# Patient Record
Sex: FEMALE | Race: WHITE | NOT HISPANIC OR LATINO | Employment: FULL TIME | URBAN - METROPOLITAN AREA
[De-identification: names, ages, dates, MRNs, and addresses within clinical notes are randomized per-mention and may not be internally consistent; named-entity substitution may affect disease eponyms.]

---

## 2017-05-16 ENCOUNTER — HOSPITAL ENCOUNTER (OUTPATIENT)
Dept: RADIOLOGY | Facility: MEDICAL CENTER | Age: 57
End: 2017-05-16
Attending: OBSTETRICS & GYNECOLOGY
Payer: COMMERCIAL

## 2017-05-16 DIAGNOSIS — N00.0: ICD-10-CM

## 2017-05-16 PROCEDURE — G0202 SCR MAMMO BI INCL CAD: HCPCS

## 2017-08-08 ENCOUNTER — OUTPATIENT INFUSION SERVICES (OUTPATIENT)
Dept: ONCOLOGY | Facility: MEDICAL CENTER | Age: 57
End: 2017-08-08
Attending: INTERNAL MEDICINE
Payer: COMMERCIAL

## 2017-08-08 VITALS
RESPIRATION RATE: 18 BRPM | SYSTOLIC BLOOD PRESSURE: 102 MMHG | HEIGHT: 62 IN | OXYGEN SATURATION: 95 % | TEMPERATURE: 98.1 F | WEIGHT: 98.11 LBS | DIASTOLIC BLOOD PRESSURE: 67 MMHG | HEART RATE: 79 BPM | BODY MASS INDEX: 18.05 KG/M2

## 2017-08-08 PROCEDURE — 700111 HCHG RX REV CODE 636 W/ 250 OVERRIDE (IP): Performed by: INTERNAL MEDICINE

## 2017-08-08 PROCEDURE — 96365 THER/PROPH/DIAG IV INF INIT: CPT

## 2017-08-08 RX ORDER — ZOLEDRONIC ACID 5 MG/100ML
5 INJECTION, SOLUTION INTRAVENOUS ONCE
Status: COMPLETED | OUTPATIENT
Start: 2017-08-08 | End: 2017-08-08

## 2017-08-08 RX ADMIN — ZOLEDRONIC ACID 5 MG: 0.05 INJECTION, SOLUTION INTRAVENOUS at 14:09

## 2017-08-08 ASSESSMENT — PAIN SCALES - GENERAL: PAINLEVEL: NO PAIN

## 2017-08-08 NOTE — PROGRESS NOTES
Patient presents ambulatory for reclast infusion.  Patient reports feeling well and denies any s/s of infection at this time.  Patient educated regarding possible complications with bone healing with invasive dental work 8 weeks prior to or 8 weeks after receiving this medication, patient verbalized understanding, and denies having had any invasive dental work in the last 8 weeks nor does she have any scheduled in the next 8 weeks.  PIV established, brisk blood return noted, and flushed.  Labs previously collected, reviewed and are within parameters to proceed with treatment.  Reclast infused per MD order with no complications or adverse reactions.  Patient to see MD before scheduling further reclast appointments.  PIV flushed, brisk blood return noted, discontinued, gauze, and coban applied to site.  Patient left ambulatory in no distress.

## 2017-08-08 NOTE — PROGRESS NOTES
Pharmacy Note:    8/2/17:  Scr = 0.62 with est crcl ~ 61ml/min  Calcium  = 9.3  Previous Reclast at [facility] = 10/3/2010    Ok to proceed with reclast 5 mg infusion  IRON Penaloza Pharm.D.

## 2018-02-27 ENCOUNTER — OFFICE VISIT (OUTPATIENT)
Dept: CARDIOLOGY | Facility: MEDICAL CENTER | Age: 58
End: 2018-02-27
Payer: COMMERCIAL

## 2018-02-27 VITALS
BODY MASS INDEX: 20.58 KG/M2 | SYSTOLIC BLOOD PRESSURE: 120 MMHG | WEIGHT: 109 LBS | HEIGHT: 61 IN | HEART RATE: 70 BPM | DIASTOLIC BLOOD PRESSURE: 70 MMHG

## 2018-02-27 DIAGNOSIS — Z82.49 FAMILY HISTORY OF PERIPHERAL VASCULAR DISEASE: ICD-10-CM

## 2018-02-27 DIAGNOSIS — Z82.3 FAMILY HISTORY OF STROKE: ICD-10-CM

## 2018-02-27 DIAGNOSIS — R07.89 OTHER CHEST PAIN: Primary | ICD-10-CM

## 2018-02-27 LAB — EKG IMPRESSION: NORMAL

## 2018-02-27 PROCEDURE — 99204 OFFICE O/P NEW MOD 45 MIN: CPT | Performed by: INTERNAL MEDICINE

## 2018-02-27 PROCEDURE — 93000 ELECTROCARDIOGRAM COMPLETE: CPT | Performed by: INTERNAL MEDICINE

## 2018-02-27 RX ORDER — TRAZODONE HYDROCHLORIDE 100 MG/1
100 TABLET ORAL NIGHTLY
COMMUNITY
End: 2023-11-06

## 2018-02-27 ASSESSMENT — ENCOUNTER SYMPTOMS
SPUTUM PRODUCTION: 0
COUGH: 1

## 2018-02-27 NOTE — LETTER
Name:          Anisa Murillo   YOB: 1960  Date:     2/27/2018      Regan Patel D.O.  [address]  [address]  [facility] [address]     Waldo Zuluaga MD  [address], [address]  [place] [address]  Phone: [phone]  Back Line: [phone]  Fax: [phone]  E-mail: [email or web address]   Dear Dr. Patel,    We had the pleasure of seeing your patient, Anisa Murillo, in Cardiology Clinic at [facility] and [facility]ascular today.    As you know, she is a 57-year-old woman with a family history of stroke and peripheral artery disease referred for evaluation of chest pain.    The chest pain that she describes to me today is atypical for angina. However, in the setting of her family history of heart disease I did order an exercise stress echocardiogram. I rather suspect this could've been pleuritic in nature, and I did check inflammatory markers (sedimentation rate and C-reactive protein). Alternatively, this could represent reflux and I asked her to attempt ranitidine and calcium carbonate if episodes recur diagnostically to see if she gets relief.    Return in about 3 months (around 5/27/2018).    Thank you for the referral and please do not hesitate to contact me at any time. My contact information is listed above.    This note was dictated using Dragon speech recognition software.     A full note including my physical examination and a full list of rectified medications is available in our medical record, and can be faxed as well.    Waldo Zuluaga MD  Cardiologist  [facility]

## 2018-02-27 NOTE — PROGRESS NOTES
Subjective:   Anisa Murillo is a 57 -year-old woman with a family history of stroke and peripheral artery disease referred for evaluation of chest pain.    She tells me today about an episode of chest discomfort that she had this previous Saturday, 4 days ago. She woke up with the discomfort at 7 AM, and it lasted until 10 PM. She had polydipsia with the episode, which she feels is generally quite uncommon for her. She describes it as a tightness, 7-8 out of 10 in severity. She had mild dyspnea associated with it and no diaphoresis. It was strictly nonexertional and did not worsen whatsoever when she climbed the stairs in her house. She has not had any similar chest discomfort with exertion recently. She did have one smaller episode last night lasting on and off for a few hours. She denies any palliating or provoking factors such as position, food, or exertion again. She does note that she had a cold about one month ago.    She has no other cardiovascular complaints. Her mother had a carotid endarterectomy at the age of 80.    Past Medical History:   Diagnosis Date   • Anxiety 3/24/2015   • Contusion 10/26/2015   • Eczema 3/22/2016   • Herpes simplex type 1 infection 10/26/2015   • Hypertension    • Insomnia    • Osteopenia 3/24/2015     Past Surgical History:   Procedure Laterality Date   • PB ENLARGE BREAST WITH IMPLANT       Family History   Problem Relation Age of Onset   • Cancer Mother      breast    • Diabetes Mother    • Stroke Father    • Stroke Sister    • Genetic Son      autism      History   Smoking Status   • Former Smoker   • Years: 1.00   • Types: Cigarettes   Smokeless Tobacco   • Never Used     No Known Allergies  Outpatient Encounter Prescriptions as of 2/27/2018   Medication Sig Dispense Refill   • traZODone (DESYREL) 100 MG Tab Take 100 mg by mouth every evening.     • Multiple Vitamin (MULTI-VITAMIN DAILY PO) Take  by mouth.     • FIBER SELECT GUMMIES PO Take  by mouth.     • vitamin D  "(CHOLECALCIFEROL) 1000 UNIT TABS Take 2,000 Units by mouth every day.     • Calcium-Vitamin D (CALCIUM + D PO) Take  by mouth.     • [DISCONTINUED] sertraline (ZOLOFT) 50 MG Tab Take 1 Tab by mouth every day. 90 Tab 3   • [DISCONTINUED] alprazolam (XANAX) 1 MG Tab Take 1 Tab by mouth at bedtime as needed for Sleep. AS NEEDED FOR SLEEP 30 Tab 0   • [DISCONTINUED] valacyclovir (VALTREX) 500 MG Tab Take 1 Tab by mouth 2 times a day. 40 Tab 1     No facility-administered encounter medications on file as of 2/27/2018.      Review of Systems   Constitutional:        Febrile illness mid January, 2018 (URI)   HENT: Positive for congestion.    Respiratory: Positive for cough. Negative for sputum production.    Cardiovascular: Positive for chest pain.   All other systems reviewed and are negative.       Objective:   /70   Pulse 70   Ht 1.549 m (5' 1\")   Wt 49.4 kg (109 lb)   BMI 20.60 kg/m²     Physical Exam   Constitutional: She is oriented to person, place, and time. She appears well-developed and well-nourished. No distress.   Well-dressed, middle-aged woman in no distress   HENT:   Head: Normocephalic and atraumatic.   Eyes: Conjunctivae and EOM are normal. Pupils are equal, round, and reactive to light. No scleral icterus.   Neck: Neck supple. No JVD present. No tracheal deviation present.   Cardiovascular: Normal rate, regular rhythm, normal heart sounds and intact distal pulses.  Exam reveals no gallop and no friction rub.    No murmur heard.  Pulses:       Dorsalis pedis pulses are 2+ on the right side, and 2+ on the left side.   No carotid bruits   Pulmonary/Chest: Effort normal and breath sounds normal. No stridor. No respiratory distress. She has no wheezes. She has no rales.   Abdominal: Soft. Bowel sounds are normal. She exhibits no distension.   Musculoskeletal: She exhibits no edema.   Neurological: She is alert and oriented to person, place, and time.   Skin: Skin is warm and dry. No rash noted. She " is not diaphoretic. No erythema. No pallor.   Psychiatric: She has a normal mood and affect. Judgment and thought content normal.   Vitals reviewed.    Lab Results   Component Value Date/Time    WBC 4.7 03/31/2015 08:42 AM    WBC 5.7 08/27/2013 07:38 AM    RBC 4.03 03/31/2015 08:42 AM    RBC 4.05 (L) 08/27/2013 07:38 AM    HEMOGLOBIN 13.5 03/31/2015 08:42 AM    HEMOGLOBIN 13.9 08/27/2013 07:38 AM    HEMATOCRIT 39.8 03/31/2015 08:42 AM    HEMATOCRIT 40.5 08/27/2013 07:38 AM    MCV 99 (H) 03/31/2015 08:42 AM    .0 (H) 08/27/2013 07:38 AM    MCH 33.5 (H) 03/31/2015 08:42 AM    MCH 34.4 (H) 08/27/2013 07:38 AM    MCHC 33.9 03/31/2015 08:42 AM    MCHC 34.4 08/27/2013 07:38 AM    MPV 7.7 08/27/2013 07:38 AM        Lab Results   Component Value Date/Time    SODIUM 144 06/27/2016 07:03 AM    SODIUM 139 10/08/2013 07:40 AM    POTASSIUM 4.5 06/27/2016 07:03 AM    POTASSIUM 3.8 10/08/2013 07:40 AM    CHLORIDE 104 06/27/2016 07:03 AM    CHLORIDE 103 10/08/2013 07:40 AM    CO2 26 06/27/2016 07:03 AM    CO2 27 10/08/2013 07:40 AM    GLUCOSE 103 (H) 06/27/2016 07:03 AM    GLUCOSE 88 10/08/2013 07:40 AM    BUN 12 06/27/2016 07:03 AM    BUN 21 10/08/2013 07:40 AM    CREATININE 0.76 06/27/2016 07:03 AM    CREATININE 0.65 10/08/2013 07:40 AM    BUNCREATRAT 16 06/27/2016 07:03 AM        Lab Results   Component Value Date/Time    ASTSGOT 17 06/27/2016 07:03 AM    ASTSGOT 20 08/27/2013 07:38 AM    ALTSGPT 15 06/27/2016 07:03 AM    ALTSGPT 16 08/27/2013 07:38 AM        Lab Results   Component Value Date/Time    CHOLSTRLTOT 187 06/27/2016 07:03 AM    CHOLSTRLTOT 214 (H) 10/08/2013 07:40 AM    LDL 81 06/27/2016 07:03 AM    LDL 81 10/08/2013 07:40 AM    HDL 92 06/27/2016 07:03 AM     10/08/2013 07:40 AM    TRIGLYCERIDE 70 06/27/2016 07:03 AM    TRIGLYCERIDE 61 10/08/2013 07:40 AM       Her EKG in the office today shows sinus rhythm with no chamber enlargement nor ischemic changes.    Assessment:     1. Other chest pain  EKG     Echo-Rest/Stress w/o Contrast    WESTERGREN SED RATE    C-REACTIVE PROTEIN, QUANT C   2. Family history of stroke     3. Family history of peripheral vascular disease         Medical Decision Making:  Today's Assessment / Status / Plan:     The chest pain that she describes to me today is atypical for angina. However, in the setting of her family history of heart disease I did order an exercise stress echocardiogram. I rather suspect this could've been pleuritic in nature, and I did check inflammatory markers (sedimentation rate and C-reactive protein). Alternatively, this could represent reflux and I asked her to attempt ranitidine and calcium carbonate if episodes recur diagnostically to see if she gets relief.    Waldo Zuluaga MD  Cardiologist, [facility] [place]     Return in about 3 months (around 5/27/2018).

## 2018-03-02 LAB
CRP SERPL-MCNC: <0.3 MG/L (ref 0–4.9)
HBA1C MFR BLD: 5.2 % (ref 4.8–5.6)

## 2018-03-05 ENCOUNTER — TELEPHONE (OUTPATIENT)
Dept: CARDIOLOGY | Facility: MEDICAL CENTER | Age: 58
End: 2018-03-05

## 2018-03-05 NOTE — TELEPHONE ENCOUNTER
----- Message from Waldo Zuluaga M.D. sent at 3/2/2018  5:02 PM PST -----  No evidence of ongoing inflammation associated with her chest pain.    ROSIE Zuluaga M.D.  Audra Gray, R.N.             Normal lead sugars (no evidence of diabetes).     ROSIE MARRUFO        ===================================================================    Attempted to call pt, no answer, detailed vm left that Cell[employer] message will be sent in regards to her blood test.

## 2018-03-06 NOTE — TELEPHONE ENCOUNTER
Kya Gray R.N.   Phone Number: [phone]             IA/eliza     Pt calling to ask if it is really necessary to do the echo / stress test in view of the positive results of recent blood work.     Please call Anisa at work # [phone] or if 11:30am-1pm on cell # [phone]      ==================================================    Upon chart review last OV notes from Dr Zuluaga 2/27/18:    The chest pain that she describes to me today is atypical for angina. However, in the setting of her family history of heart disease I did order an exercise stress echocardiogram. I rather suspect this could've been pleuritic in nature, and I did check inflammatory markers (sedimentation rate and C-reactive protein).     Called pt back, discussed above recommendations by Dr Zuluaga and recent lab results, pt verbalizes understanding

## 2018-03-15 ENCOUNTER — HOSPITAL ENCOUNTER (OUTPATIENT)
Dept: CARDIOLOGY | Facility: MEDICAL CENTER | Age: 58
End: 2018-03-15
Attending: INTERNAL MEDICINE
Payer: COMMERCIAL

## 2018-03-15 DIAGNOSIS — R07.89 OTHER CHEST PAIN: ICD-10-CM

## 2018-03-15 LAB — LV EJECT FRACT  99904: 55

## 2018-03-15 PROCEDURE — 93018 CV STRESS TEST I&R ONLY: CPT | Performed by: INTERNAL MEDICINE

## 2018-03-15 PROCEDURE — 93017 CV STRESS TEST TRACING ONLY: CPT

## 2018-03-15 PROCEDURE — 93350 STRESS TTE ONLY: CPT

## 2018-03-15 PROCEDURE — 93350 STRESS TTE ONLY: CPT | Mod: 26 | Performed by: INTERNAL MEDICINE

## 2018-03-16 ENCOUNTER — TELEPHONE (OUTPATIENT)
Dept: CARDIOLOGY | Facility: MEDICAL CENTER | Age: 58
End: 2018-03-16

## 2018-03-16 NOTE — TELEPHONE ENCOUNTER
----- Message from Waldo Zuluaga M.D. sent at 3/16/2018  8:49 AM PDT -----  Stress test is negative indicating that chest pain was not related to the arteries of her heart.    ROSIE MARRUFO

## 2018-03-16 NOTE — TELEPHONE ENCOUNTER
----- Message from Kiersten Vazquez L.P.N. sent at 3/16/2018  3:03 PM PDT -----      ----- Message -----  From: Rhina Hernandez R.N.  Sent: 3/16/2018  10:02 AM  To: Rhina Hernandez R.N.        ----- Message -----  From: Waldo Zuluaga M.D.  Sent: 3/16/2018   8:49 AM  To: Audra Gray R.N.    Stress test is negative indicating that chest pain was not related to the arteries of her heart.    ROSIE MARRUFO

## 2018-03-19 ENCOUNTER — TELEPHONE (OUTPATIENT)
Dept: CARDIOLOGY | Facility: MEDICAL CENTER | Age: 58
End: 2018-03-19

## 2018-03-19 NOTE — TELEPHONE ENCOUNTER
----- Message from Kya Guzman sent at 3/19/2018  1:51 PM PDT -----  Regarding: appt advice  Contact: [phone]  IA/eliza    Pt calling to ask if it will be necessary for pt to keep the upcoming appt with IA. Please call pt at .    ================================================================    Called pt, informed pt that we usually recommend to keep her appt as recommended by Dr Zuluaga from her last OV despite of the negative test but it is till up to her if she wants to keep it or not, pt verbalizes understanding

## 2018-05-18 ENCOUNTER — HOSPITAL ENCOUNTER (OUTPATIENT)
Dept: RADIOLOGY | Facility: MEDICAL CENTER | Age: 58
End: 2018-05-18
Attending: NURSE PRACTITIONER
Payer: COMMERCIAL

## 2018-05-18 DIAGNOSIS — Z12.31 SCREENING MAMMOGRAM, ENCOUNTER FOR: ICD-10-CM

## 2018-05-18 PROCEDURE — 77063 BREAST TOMOSYNTHESIS BI: CPT

## 2018-06-20 ENCOUNTER — OFFICE VISIT (OUTPATIENT)
Dept: CARDIOLOGY | Facility: MEDICAL CENTER | Age: 58
End: 2018-06-20
Payer: COMMERCIAL

## 2018-06-20 VITALS
SYSTOLIC BLOOD PRESSURE: 102 MMHG | HEIGHT: 61 IN | OXYGEN SATURATION: 95 % | DIASTOLIC BLOOD PRESSURE: 60 MMHG | HEART RATE: 88 BPM | WEIGHT: 109 LBS | BODY MASS INDEX: 20.58 KG/M2

## 2018-06-20 DIAGNOSIS — E78.5 DYSLIPIDEMIA: ICD-10-CM

## 2018-06-20 DIAGNOSIS — R07.89 NON-CARDIAC CHEST PAIN: Primary | ICD-10-CM

## 2018-06-20 DIAGNOSIS — M79.642 LEFT HAND PAIN: ICD-10-CM

## 2018-06-20 DIAGNOSIS — Z82.3 FAMILY HISTORY OF STROKE: ICD-10-CM

## 2018-06-20 DIAGNOSIS — Z82.49 FAMILY HISTORY OF PERIPHERAL VASCULAR DISEASE: ICD-10-CM

## 2018-06-20 PROCEDURE — 99213 OFFICE O/P EST LOW 20 MIN: CPT | Performed by: INTERNAL MEDICINE

## 2018-06-20 ASSESSMENT — ENCOUNTER SYMPTOMS
SPUTUM PRODUCTION: 0
CARDIOVASCULAR NEGATIVE: 1

## 2018-06-20 NOTE — LETTER
Name:          Anisa Murillo   YOB: 1960  Date:     06/20/2018      Regan Patel D.O.  [address] [address]     Waldo Zuluaga MD  [address], [address]  [place] [address]  Phone: [phone]  Back Line: [phone]  Fax: [phone]  E-mail: [email or web address]   Dear Dr. Patel,    We had the pleasure of seeing your patient, Anisa Murillo, in Cardiology Clinic at [facility] and Vascular today.    As you know, she is a 58-year-old woman with a family history of stroke and peripheral artery disease referred for evaluation of chest pain.    She has no cardiovascular complaints today, and I reviewed the results of her exercise stress echocardiogram from March that documented no ischemic defect with normal left ventricular ejection fraction.  I also reviewed with her the C-reactive protein that she had again from March, which was negative.  Her chest pain is noncardiac.  I did order repeat lipid panel, and will plan to follow that on an annual basis.    She relates some pain and has some redness on exam of her left fourth PIP joint.  I ordered x-rays of her hand.  I recommended that she follow-up in the primary care setting within 1 week if there is not rapid resolution of that inflammation.    Return in about 1 year (around 6/20/2019).    Thank you for the referral and please do not hesitate to contact me at any time. My contact information is listed above.    This note was dictated using Dragon speech recognition software.     A full note including my physical examination and a full list of rectified medications is available in our medical record, and can be faxed as well.    Waldo Zuluaga MD  Cardiologist  [facility]

## 2018-06-20 NOTE — PROGRESS NOTES
Subjective:   Anisa Murillo is a 58 -year-old woman with a family history of stroke and peripheral artery disease referred for evaluation of chest pain.    She is doing well today from a cardiovascular perspective and has no complaints with interval resolution of her noncardiac chest pain workup with exercise stress echocardiogram that I reviewed with her as below demonstrating no ischemic defect.    She does tell me that for about 3 weeks she has had some redness and pain with flexion of her left fourth PIP joint.  That happened apparently after pulling some weeds.    Past Medical History:   Diagnosis Date   • Anxiety 3/24/2015   • Contusion 10/26/2015   • Eczema 3/22/2016   • Herpes simplex type 1 infection 10/26/2015   • Hypertension    • Insomnia    • Osteopenia 3/24/2015     Past Surgical History:   Procedure Laterality Date   • PB ENLARGE BREAST WITH IMPLANT       Family History   Problem Relation Age of Onset   • Cancer Mother      breast    • Diabetes Mother    • Heart Disease Mother 80     carotid disease and CEA   • Stroke Father    • Stroke Sister    • Genetic Son      autism      History   Smoking Status   • Former Smoker   • Years: 1.00   • Types: Cigarettes   Smokeless Tobacco   • Never Used     No Known Allergies  Outpatient Encounter Prescriptions as of 6/20/2018   Medication Sig Dispense Refill   • Multiple Vitamin (MULTI-VITAMIN DAILY PO) Take  by mouth.     • FIBER SELECT GUMMIES PO Take  by mouth.     • vitamin D (CHOLECALCIFEROL) 1000 UNIT TABS Take 2,000 Units by mouth every day.     • Calcium-Vitamin D (CALCIUM + D PO) Take  by mouth.     • traZODone (DESYREL) 100 MG Tab Take 100 mg by mouth every evening.       No facility-administered encounter medications on file as of 6/20/2018.      Review of Systems   Constitutional:        Febrile illness mid January, 2018 (URI)   HENT: Positive for congestion.    Respiratory: Negative for sputum production.    Cardiovascular: Negative.   "  Musculoskeletal: Positive for joint pain.   All other systems reviewed and are negative.       Objective:   /60   Pulse 88   Ht 1.549 m (5' 1\")   Wt 49.4 kg (109 lb)   SpO2 95%   BMI 20.60 kg/m²     Physical Exam   Constitutional: She is oriented to person, place, and time. She appears well-developed and well-nourished. No distress.   Well-dressed, middle-aged woman in no distress   HENT:   Head: Normocephalic and atraumatic.   Eyes: Conjunctivae and EOM are normal. Pupils are equal, round, and reactive to light. No scleral icterus.   Neck: Neck supple. No JVD present. No tracheal deviation present.   Cardiovascular: Normal rate, regular rhythm, normal heart sounds and intact distal pulses.  Exam reveals no gallop and no friction rub.    No murmur heard.  Pulses:       Dorsalis pedis pulses are 2+ on the right side, and 2+ on the left side.   No carotid bruits   Pulmonary/Chest: Effort normal and breath sounds normal. No stridor. No respiratory distress. She has no wheezes. She has no rales.   Abdominal: Soft. Bowel sounds are normal. She exhibits no distension.   Musculoskeletal: She exhibits no edema.   Erythema of the skin on the medial aspect of her left fourth finger and pain with flexion of her left fourth PIP joint   Neurological: She is alert and oriented to person, place, and time.   Skin: Skin is warm and dry. No rash noted. She is not diaphoretic. No erythema. No pallor.   Psychiatric: She has a normal mood and affect. Judgment and thought content normal.   Vitals reviewed.    Lab Results   Component Value Date/Time    WBC 4.7 03/31/2015 08:42 AM    WBC 5.7 08/27/2013 07:38 AM    RBC 4.03 03/31/2015 08:42 AM    RBC 4.05 (L) 08/27/2013 07:38 AM    HEMOGLOBIN 13.5 03/31/2015 08:42 AM    HEMOGLOBIN 13.9 08/27/2013 07:38 AM    HEMATOCRIT 39.8 03/31/2015 08:42 AM    HEMATOCRIT 40.5 08/27/2013 07:38 AM    MCV 99 (H) 03/31/2015 08:42 AM    .0 (H) 08/27/2013 07:38 AM    MCH 33.5 (H) 03/31/2015 " "08:42 AM    MCH 34.4 (H) 08/27/2013 07:38 AM    MCHC 33.9 03/31/2015 08:42 AM    MCHC 34.4 08/27/2013 07:38 AM    MPV 7.7 08/27/2013 07:38 AM        Lab Results   Component Value Date/Time    SODIUM 144 06/27/2016 07:03 AM    SODIUM 139 10/08/2013 07:40 AM    POTASSIUM 4.5 06/27/2016 07:03 AM    POTASSIUM 3.8 10/08/2013 07:40 AM    CHLORIDE 104 06/27/2016 07:03 AM    CHLORIDE 103 10/08/2013 07:40 AM    CO2 26 06/27/2016 07:03 AM    CO2 27 10/08/2013 07:40 AM    GLUCOSE 103 (H) 06/27/2016 07:03 AM    GLUCOSE 88 10/08/2013 07:40 AM    BUN 12 06/27/2016 07:03 AM    BUN 21 10/08/2013 07:40 AM    CREATININE 0.76 06/27/2016 07:03 AM    CREATININE 0.65 10/08/2013 07:40 AM    BUNCREATRAT 16 06/27/2016 07:03 AM        Lab Results   Component Value Date/Time    ASTSGOT 17 06/27/2016 07:03 AM    ASTSGOT 20 08/27/2013 07:38 AM    ALTSGPT 15 06/27/2016 07:03 AM    ALTSGPT 16 08/27/2013 07:38 AM        Lab Results   Component Value Date/Time    CHOLSTRLTOT 187 06/27/2016 07:03 AM    CHOLSTRLTOT 214 (H) 10/08/2013 07:40 AM    LDL 81 06/27/2016 07:03 AM    LDL 81 10/08/2013 07:40 AM    HDL 92 06/27/2016 07:03 AM     10/08/2013 07:40 AM    TRIGLYCERIDE 70 06/27/2016 07:03 AM    TRIGLYCERIDE 61 10/08/2013 07:40 AM       Her EKG in the office today shows sinus rhythm with no chamber enlargement nor ischemic changes.    Exercise stress echocardiogram, 3/15/2018:  \"CONCLUSIONS  Negative stress echocardiogram for ischemia with adequate stress achieved by heart rate criteria.\"    Assessment:     1. Non-cardiac chest pain     2. Family history of stroke  LIPID PANEL   3. Left hand pain  X-RAY HAND 3+ VW   4. Family history of peripheral vascular disease     5. Dyslipidemia  LIPID PANEL       Medical Decision Making:  Today's Assessment / Status / Plan:     She has no cardiovascular complaints today, and I reviewed the results of her exercise stress echocardiogram from March that documented no ischemic defect with normal left " ventricular ejection fraction.  I also reviewed with her the C-reactive protein that she had again from March, which was negative.  Her chest pain is noncardiac.  I did order repeat lipid panel, and will plan to follow that on an annual basis.    She relates some pain and has some redness on exam of her left fourth PIP joint.  I ordered x-rays of her hand.  I recommended that she follow-up in the primary care setting within 1 week if there is not rapid resolution of that inflammation.    Waldo Zuluaga MD  Cardiologist, [facility] [place]     Return in about 1 year (around 6/20/2019).    Physical Exam   Constitutional: She is oriented to person, place, and time. She appears well-developed and well-nourished. No distress.   Well-dressed, middle-aged woman in no distress   HENT:   Head: Normocephalic and atraumatic.   Eyes: Conjunctivae and EOM are normal. Pupils are equal, round, and reactive to light. No scleral icterus.   Neck: Neck supple. No JVD present. No tracheal deviation present.   Cardiovascular: Normal rate, regular rhythm, normal heart sounds and intact distal pulses.  Exam reveals no gallop and no friction rub.    No murmur heard.  Pulses:       Dorsalis pedis pulses are 2+ on the right side, and 2+ on the left side.   No carotid bruits   Pulmonary/Chest: Effort normal and breath sounds normal. No stridor. No respiratory distress. She has no wheezes. She has no rales.   Abdominal: Soft. Bowel sounds are normal. She exhibits no distension.   Musculoskeletal: She exhibits no edema.   Erythema of the skin on the medial aspect of her left fourth finger and pain with flexion of her left fourth PIP joint   Neurological: She is alert and oriented to person, place, and time.   Skin: Skin is warm and dry. No rash noted. She is not diaphoretic. No erythema. No pallor.   Psychiatric: She has a normal mood and affect. Judgment and thought content normal.   Vitals reviewed.

## 2018-06-21 ENCOUNTER — OFFICE VISIT (OUTPATIENT)
Dept: URGENT CARE | Facility: PHYSICIAN GROUP | Age: 58
End: 2018-06-21
Payer: COMMERCIAL

## 2018-06-21 VITALS
BODY MASS INDEX: 20.58 KG/M2 | WEIGHT: 109 LBS | HEIGHT: 61 IN | TEMPERATURE: 99.5 F | SYSTOLIC BLOOD PRESSURE: 104 MMHG | OXYGEN SATURATION: 95 % | RESPIRATION RATE: 16 BRPM | DIASTOLIC BLOOD PRESSURE: 72 MMHG | HEART RATE: 74 BPM

## 2018-06-21 DIAGNOSIS — L03.012 CELLULITIS OF FINGER OF LEFT HAND: ICD-10-CM

## 2018-06-21 PROCEDURE — 99214 OFFICE O/P EST MOD 30 MIN: CPT | Performed by: NURSE PRACTITIONER

## 2018-06-21 RX ORDER — AMOXICILLIN 500 MG/1
500 CAPSULE ORAL 2 TIMES DAILY
Qty: 14 CAP | Refills: 0 | Status: SHIPPED | OUTPATIENT
Start: 2018-06-21 | End: 2018-06-28

## 2018-06-21 ASSESSMENT — ENCOUNTER SYMPTOMS
MYALGIAS: 0
WEAKNESS: 0
CHILLS: 0
TINGLING: 0
FEVER: 0
SENSORY CHANGE: 0

## 2018-06-21 NOTE — PROGRESS NOTES
"Subjective:      Anisa Murillo is a 58 y.o. female who presents with Finger Pain (finger pain, swelling x3 weeks - atraumatic)            HPI  Anisa is here for left ring finger redness and discomfort. States was doing yard work 3 weeks ago and unknown if she got a a \"sticker\" in finger or if possible bug bite. Denies fever, red streaking or generalized swelling of finger. Able to bend finger with no problems but \"feels tight\" when doing this. States saw here cardiologist today and had mentioned this finger and ordered an xray and sent her to .    PMH:  has a past medical history of Anxiety (3/24/2015); Contusion (10/26/2015); Eczema (3/22/2016); Herpes simplex type 1 infection (10/26/2015); Hypertension; Insomnia; and Osteopenia (3/24/2015). She also has no past medical history of Breast cancer (HCC).  MEDS:   Current Outpatient Prescriptions:   •  amoxicillin (AMOXIL) 500 MG Cap, Take 1 Cap by mouth 2 times a day for 7 days., Disp: 14 Cap, Rfl: 0  •  traZODone (DESYREL) 100 MG Tab, Take 100 mg by mouth every evening., Disp: , Rfl:   •  Multiple Vitamin (MULTI-VITAMIN DAILY PO), Take  by mouth., Disp: , Rfl:   •  FIBER SELECT GUMMIES PO, Take  by mouth., Disp: , Rfl:   •  vitamin D (CHOLECALCIFEROL) 1000 UNIT TABS, Take 2,000 Units by mouth every day., Disp: , Rfl:   •  Calcium-Vitamin D (CALCIUM + D PO), Take  by mouth., Disp: , Rfl:   ALLERGIES: No Known Allergies  SURGHX:   Past Surgical History:   Procedure Laterality Date   • PB ENLARGE BREAST WITH IMPLANT       SOCHX:  reports that she has quit smoking. Her smoking use included Cigarettes. She quit after 1.00 year of use. She has never used smokeless tobacco. She reports that she does not drink alcohol or use drugs.  FH: Family history was reviewed, no pertinent findings to report'  Review of Systems   Constitutional: Negative for chills, fever and malaise/fatigue.   Musculoskeletal: Negative for joint pain and myalgias.   Skin: Negative for itching " "and rash.   Neurological: Negative for tingling, sensory change and weakness.   All other systems reviewed and are negative.         Objective:     /72   Pulse 74   Temp 37.5 °C (99.5 °F)   Resp 16   Ht 1.549 m (5' 1\")   Wt 49.4 kg (109 lb)   SpO2 95%   BMI 20.60 kg/m²      Physical Exam   Constitutional: She is oriented to person, place, and time. Vital signs are normal. She appears well-developed and well-nourished. She is active and cooperative.  Non-toxic appearance. She does not have a sickly appearance. She does not appear ill. No distress.   HENT:   Head: Normocephalic.   Eyes: Conjunctivae and EOM are normal. Pupils are equal, round, and reactive to light.   Neck: Normal range of motion. Neck supple.   Cardiovascular: Normal rate.    Pulmonary/Chest: Effort normal.   Musculoskeletal:        Left hand: She exhibits normal range of motion, no tenderness, no bony tenderness, normal two-point discrimination, normal capillary refill, no deformity, no laceration and no swelling. Normal sensation noted. Normal strength noted.        Hands:  Neurological: She is alert and oriented to person, place, and time.   Skin: Skin is warm and dry. No abrasion, no bruising, no ecchymosis, no laceration, no lesion and no rash noted. She is not diaphoretic. No erythema.   Has minimal superficial redness and swelling at medial aspect of left ring finger along 2nd joint but does not appear to have joint involvment.    Vitals reviewed.              Assessment/Plan:     1. Cellulitis of finger of left hand    - amoxicillin (AMOXIL) 500 MG Cap; Take 1 Cap by mouth 2 times a day for 7 days.  Dispense: 14 Cap; Refill: 0    May use NSAID prn for discomfort  May continue Benadryl x 2 days for immediate relief of itchiness of rash from possible bug bite  May use Claritin x 10 days for longer acting antihistamine prn  Wash in neutral pH, non-perfumed soap and lukewarm water, pat dry  Moisturize skin around area to avoid " dryness  Soak finger in Epsom salts  May apply moist warm compress to draw out swelling  Elevate hand, cool compresses for any generalized swelling  Monitor for increase in redness of skin or spreading up hand, blistering, weeping of sites, fever- need re-evaluation immediately

## 2018-06-23 LAB
CHOLEST SERPL-MCNC: 199 MG/DL (ref 100–199)
HDLC SERPL-MCNC: 86 MG/DL
LABORATORY COMMENT REPORT: NORMAL
LDLC SERPL CALC-MCNC: 89 MG/DL (ref 0–99)
TRIGL SERPL-MCNC: 120 MG/DL (ref 0–149)
VLDLC SERPL CALC-MCNC: 24 MG/DL (ref 5–40)

## 2018-06-29 ENCOUNTER — TELEPHONE (OUTPATIENT)
Dept: CARDIOLOGY | Facility: MEDICAL CENTER | Age: 58
End: 2018-06-29

## 2018-06-29 NOTE — TELEPHONE ENCOUNTER
----- Message from Melissa Cary sent at 6/29/2018  8:47 AM PDT -----  Regarding: calling for Lipid results  IA/Audra    Patient is calling for Lipid results. She can be reached at [phone].    ===================================================    Called pt, informed pt that Dr Zuluaga hasn't review the result yet and made his recommendations but Lipid Panel looks good and all within normal limits, informed pt that if Dr Zuluaga has further recommendations, we will give her a call back, pt appreciative and verbalizes understanding

## 2018-07-03 NOTE — TELEPHONE ENCOUNTER
Dr Zuluaga reviewed pt's Lipid Panel, per Dr Zuluaga Cholesterol looks good, not really much different from previous, cont diet and exercise.     Called pt and notified, pt verbalizes understanding

## 2018-08-07 ENCOUNTER — OFFICE VISIT (OUTPATIENT)
Dept: MEDICAL GROUP | Facility: PHYSICIAN GROUP | Age: 58
End: 2018-08-07
Payer: COMMERCIAL

## 2018-08-07 ENCOUNTER — HOSPITAL ENCOUNTER (OUTPATIENT)
Facility: MEDICAL CENTER | Age: 58
End: 2018-08-07
Attending: FAMILY MEDICINE
Payer: COMMERCIAL

## 2018-08-07 VITALS
WEIGHT: 110 LBS | BODY MASS INDEX: 20.77 KG/M2 | SYSTOLIC BLOOD PRESSURE: 116 MMHG | HEIGHT: 61 IN | DIASTOLIC BLOOD PRESSURE: 74 MMHG | TEMPERATURE: 97.4 F | OXYGEN SATURATION: 96 % | HEART RATE: 74 BPM

## 2018-08-07 DIAGNOSIS — R30.0 DYSURIA: ICD-10-CM

## 2018-08-07 DIAGNOSIS — N94.9 VAGINAL BURNING: ICD-10-CM

## 2018-08-07 DIAGNOSIS — N30.00 ACUTE CYSTITIS WITHOUT HEMATURIA: ICD-10-CM

## 2018-08-07 PROBLEM — N94.89 VAGINAL BURNING: Status: ACTIVE | Noted: 2018-08-07

## 2018-08-07 LAB
APPEARANCE UR: CLEAR
BILIRUB UR STRIP-MCNC: NORMAL MG/DL
COLOR UR AUTO: YELLOW
GLUCOSE UR STRIP.AUTO-MCNC: NORMAL MG/DL
KETONES UR STRIP.AUTO-MCNC: NORMAL MG/DL
LEUKOCYTE ESTERASE UR QL STRIP.AUTO: NORMAL
NITRITE UR QL STRIP.AUTO: NORMAL
PH UR STRIP.AUTO: 6 [PH] (ref 5–8)
PROT UR QL STRIP: NORMAL MG/DL
RBC UR QL AUTO: NORMAL
SP GR UR STRIP.AUTO: 1.02
UROBILINOGEN UR STRIP-MCNC: 0.2 MG/DL

## 2018-08-07 PROCEDURE — 81002 URINALYSIS NONAUTO W/O SCOPE: CPT | Performed by: FAMILY MEDICINE

## 2018-08-07 PROCEDURE — 87077 CULTURE AEROBIC IDENTIFY: CPT

## 2018-08-07 PROCEDURE — 87086 URINE CULTURE/COLONY COUNT: CPT

## 2018-08-07 PROCEDURE — 99214 OFFICE O/P EST MOD 30 MIN: CPT | Performed by: FAMILY MEDICINE

## 2018-08-07 PROCEDURE — 87186 SC STD MICRODIL/AGAR DIL: CPT

## 2018-08-07 RX ORDER — IBUPROFEN 800 MG/1
800 TABLET ORAL EVERY 8 HOURS PRN
Qty: 60 TAB | Refills: 1 | Status: SHIPPED | OUTPATIENT
Start: 2018-08-07 | End: 2018-08-21

## 2018-08-07 RX ORDER — FLUCONAZOLE 200 MG/1
200 TABLET ORAL ONCE
Qty: 1 TAB | Refills: 1 | Status: SHIPPED | OUTPATIENT
Start: 2018-08-07 | End: 2018-08-07

## 2018-08-07 RX ORDER — NITROFURANTOIN 25; 75 MG/1; MG/1
100 CAPSULE ORAL 2 TIMES DAILY
COMMUNITY
End: 2018-08-21

## 2018-08-07 ASSESSMENT — PATIENT HEALTH QUESTIONNAIRE - PHQ9: CLINICAL INTERPRETATION OF PHQ2 SCORE: 0

## 2018-08-07 NOTE — PROGRESS NOTES
Subjective:   Anisa Murillo is a 58 y.o. female here today for burning with urination, vaginal burning and itching    Dysuria  This problem is new to me.  Started out with low back pain and burning with urination approximately 7 days ago; reports chills but no fever; no n/v/d.  Patient reports that she was seen through a virtual visit and given prescription for Macrobid.  She reports compliance with medication but states there is been no resolution of symptoms.  She states that the burning has continued; she denies any suprapubic pain but is continuing to report low back pain    Vaginal burning  This problem is new to me.  Patient reports that over the last 2-3 days she started having vaginal itching and burning.  She states that she has noticed significant external redness around the area of the vagina.  She currently denies any discharge.         Current medicines (including changes today)  Current Outpatient Prescriptions   Medication Sig Dispense Refill   • nitrofurantoin monohydr macro (MACROBID) 100 MG Cap Take 100 mg by mouth 2 times a day.     • ibuprofen (MOTRIN) 800 MG Tab Take 1 Tab by mouth every 8 hours as needed. 60 Tab 1   • fluconazole (DIFLUCAN) 200 MG Tab Take 1 Tab by mouth Once for 1 dose. 1 Tab 1   • Multiple Vitamin (MULTI-VITAMIN DAILY PO) Take  by mouth.     • FIBER SELECT GUMMIES PO Take  by mouth.     • vitamin D (CHOLECALCIFEROL) 1000 UNIT TABS Take 2,000 Units by mouth every day.     • Calcium-Vitamin D (CALCIUM + D PO) Take  by mouth.     • traZODone (DESYREL) 100 MG Tab Take 100 mg by mouth every evening.       No current facility-administered medications for this visit.      She  has a past medical history of Anxiety (3/24/2015); Contusion (10/26/2015); Eczema (3/22/2016); Herpes simplex type 1 infection (10/26/2015); Hypertension; Insomnia; and Osteopenia (3/24/2015). She also has no past medical history of Breast cancer (HCC).    ROS   No chest pain, no shortness of breath, no  "abdominal pain  + Pain with urination; vaginal burning     Objective:     Blood pressure 116/74, pulse 74, temperature 36.3 °C (97.4 °F), height 1.549 m (5' 1\"), weight 49.9 kg (110 lb), SpO2 96 %. Body mass index is 20.78 kg/m².   Physical Exam:  Alert, oriented in no acute distress.  Eye contact is good, speech goal directed, affect calm  HEENT: conjunctiva non-injected, sclera non-icteric.  Pinna normal. Oral mucous membranes pink and moist with no lesions.  Neck No adenopathy or masses in the neck or supraclavicular regions.  Lungs: clear to auscultation bilaterally with good excursion.  CV: regular rate and rhythm.  Abdomen: soft, nontender, No CVAT  Ext: no edema, color normal, vascularity normal, temperature normal        Assessment and Plan:   The following treatment plan was discussed   1. Acute cystitis without hematuria      Uncontrolled.  Positive urinalysis in office; since patient has failed treatment, await results of urine culture   2. Dysuria  POCT Urinalysis    URINE CULTURE(NEW)    Uncontrolled; symptoms secondary to problem #1   3. Vaginal burning      Uncontrolled; differential diagnosis strongly suggestive for Candida infection; prescription for Diflucan provided; patient declined vaginal exam       Followup: Return if symptoms worsen or fail to improve.            "

## 2018-08-07 NOTE — ASSESSMENT & PLAN NOTE
This problem is new to me.  Patient reports that over the last 2-3 days she started having vaginal itching and burning.  She states that she has noticed significant external redness around the area of the vagina.  She currently denies any discharge.

## 2018-08-07 NOTE — ASSESSMENT & PLAN NOTE
This problem is new to me.  Started out with low back pain and burning with urination approximately 7 days ago; reports chills but no fever; no n/v/d.  Patient reports that she was seen through a virtual visit and given prescription for Macrobid.  She reports compliance with medication but states there is been no resolution of symptoms.  She states that the burning has continued; she denies any suprapubic pain but is continuing to report low back pain

## 2018-08-08 DIAGNOSIS — R30.0 DYSURIA: ICD-10-CM

## 2018-08-10 LAB
BACTERIA UR CULT: ABNORMAL
BACTERIA UR CULT: ABNORMAL
SIGNIFICANT IND 70042: ABNORMAL
SITE SITE: ABNORMAL
SOURCE SOURCE: ABNORMAL

## 2018-08-10 RX ORDER — CIPROFLOXACIN 500 MG/1
500 TABLET, FILM COATED ORAL 2 TIMES DAILY
Qty: 14 TAB | Refills: 0 | Status: SHIPPED | OUTPATIENT
Start: 2018-08-10 | End: 2018-08-17

## 2018-08-21 ENCOUNTER — OFFICE VISIT (OUTPATIENT)
Dept: MEDICAL GROUP | Facility: PHYSICIAN GROUP | Age: 58
End: 2018-08-21
Payer: COMMERCIAL

## 2018-08-21 VITALS
WEIGHT: 108 LBS | HEART RATE: 78 BPM | OXYGEN SATURATION: 98 % | DIASTOLIC BLOOD PRESSURE: 64 MMHG | TEMPERATURE: 98.6 F | HEIGHT: 61 IN | BODY MASS INDEX: 20.39 KG/M2 | SYSTOLIC BLOOD PRESSURE: 116 MMHG

## 2018-08-21 DIAGNOSIS — Z00.00 WELL ADULT ON ROUTINE HEALTH CHECK: ICD-10-CM

## 2018-08-21 PROCEDURE — 99396 PREV VISIT EST AGE 40-64: CPT | Performed by: FAMILY MEDICINE

## 2018-08-21 ASSESSMENT — PAIN SCALES - GENERAL: PAINLEVEL: NO PAIN

## 2018-08-22 NOTE — PROGRESS NOTES
"Subjective:   Anisa Murillo is a 58 y.o. female here today for annual physical    Well adult on routine health check  Patient is here today for her annual physical exam.  She currently denies any issues or concerns.  She is compliant with all medication; medications have been reviewed and updated in the chart.  Medical, surgical, social history of also been reviewed and updated in the chart with the patient today.         Current medicines (including changes today)  Current Outpatient Prescriptions   Medication Sig Dispense Refill   • traZODone (DESYREL) 100 MG Tab Take 100 mg by mouth every evening.     • Multiple Vitamin (MULTI-VITAMIN DAILY PO) Take  by mouth.     • FIBER SELECT GUMMIES PO Take  by mouth.     • vitamin D (CHOLECALCIFEROL) 1000 UNIT TABS Take 2,000 Units by mouth every day.     • Calcium-Vitamin D (CALCIUM + D PO) Take  by mouth.       No current facility-administered medications for this visit.      She  has a past medical history of Anxiety (3/24/2015); Contusion (10/26/2015); Eczema (3/22/2016); Herpes simplex type 1 infection (10/26/2015); Hypertension; Insomnia; and Osteopenia (3/24/2015). She also has no past medical history of Breast cancer (HCC).    ROS   No chest pain, no shortness of breath, no abdominal pain       Objective:     Blood pressure 116/64, pulse 78, temperature 37 °C (98.6 °F), height 1.549 m (5' 1\"), weight 49 kg (108 lb), SpO2 98 %. Body mass index is 20.41 kg/m².   Physical Exam:  Alert, oriented in no acute distress.  Eye contact is good, speech goal directed, affect calm  HEENT: conjunctiva non-injected, sclera non-icteric.  Pinna normal. TM pearly gray.   Oral mucous membranes pink and moist with no lesions.  Neck No adenopathy or masses in the neck or supraclavicular regions.  Lungs: clear to auscultation bilaterally with good excursion.  CV: regular rate and rhythm.  Abdomen: soft, nontender, No CVAT  Ext: no edema, color normal, vascularity normal, " temperature normal  Neuro: CN 2-12 grossly intact      Assessment and Plan:   The following treatment plan was discussed   1. Well adult on routine health check      No acute findings on exam; continue current medication; continue annual follow-up as directed.  Monitor       Followup: Return if symptoms worsen or fail to improve.

## 2018-08-22 NOTE — ASSESSMENT & PLAN NOTE
Patient is here today for her annual physical exam.  She currently denies any issues or concerns.  She is compliant with all medication; medications have been reviewed and updated in the chart.  Medical, surgical, social history of also been reviewed and updated in the chart with the patient today.

## 2018-08-29 ENCOUNTER — OFFICE VISIT (OUTPATIENT)
Dept: MEDICAL GROUP | Facility: PHYSICIAN GROUP | Age: 58
End: 2018-08-29
Payer: COMMERCIAL

## 2018-08-29 VITALS
HEART RATE: 72 BPM | TEMPERATURE: 98.3 F | WEIGHT: 108 LBS | HEIGHT: 61 IN | DIASTOLIC BLOOD PRESSURE: 70 MMHG | RESPIRATION RATE: 16 BRPM | BODY MASS INDEX: 20.39 KG/M2 | SYSTOLIC BLOOD PRESSURE: 130 MMHG | OXYGEN SATURATION: 98 %

## 2018-08-29 DIAGNOSIS — G47.00 INSOMNIA, UNSPECIFIED TYPE: ICD-10-CM

## 2018-08-29 DIAGNOSIS — Z76.89 ENCOUNTER TO ESTABLISH CARE WITH NEW DOCTOR: ICD-10-CM

## 2018-08-29 PROCEDURE — 99212 OFFICE O/P EST SF 10 MIN: CPT | Performed by: NURSE PRACTITIONER

## 2018-08-29 NOTE — PROGRESS NOTES
Chief Complaint   Patient presents with   • Orders Needed       HISTORY OF PRESENT ILLNESS: Patient is a 58 y.o. female new patient who presents today to discuss the following issues:    Encounter to establish care with new doctor  Is here to establish with a new primary care provider.  Was previously seen by Dr. Mendez.      Insomnia  Has been having some issues with sleep.  She is not interested in a prescription at this time.  Will try Benadryl.        Patient Active Problem List    Diagnosis Date Noted   • Macrocytosis without anemia 03/28/2014     Priority: Medium   • Allergic symptoms 10/23/2014     Priority: Low     Class: Minor   • Encounter to establish care with new doctor 08/29/2018   • Well adult on routine health check 08/21/2018   • Dysuria 08/07/2018   • Vaginal burning 08/07/2018   • Family history of stroke 02/27/2018   • Family history of peripheral vascular disease 02/27/2018   • Midline low back pain without sciatica 06/24/2016   • Cough 06/24/2016   • Eczema 03/22/2016   • Contusion 10/26/2015   • Herpes simplex type 1 infection 10/26/2015   • Osteopenia 03/24/2015   • Anxiety 03/24/2015   • Insomnia 03/24/2015       Allergies:Patient has no known allergies.    Current Outpatient Prescriptions   Medication Sig Dispense Refill   • traZODone (DESYREL) 100 MG Tab Take 100 mg by mouth every evening.     • Multiple Vitamin (MULTI-VITAMIN DAILY PO) Take  by mouth.     • FIBER SELECT GUMMIES PO Take  by mouth.     • vitamin D (CHOLECALCIFEROL) 1000 UNIT TABS Take 2,000 Units by mouth every day.     • Calcium-Vitamin D (CALCIUM + D PO) Take  by mouth.     •  MG Tab TAKE 1 TABLET BY MOUTH EVERY 8 HOURS AS NEEDED 60 Tab 1     No current facility-administered medications for this visit.        Social History   Substance Use Topics   • Smoking status: Former Smoker     Years: 1.00     Types: Cigarettes   • Smokeless tobacco: Never Used   • Alcohol use 2.4 oz/week     4 Glasses of wine per week  "      Family Status   Relation Status   • Mo Alive        cancer   • Fa Other        unknown   • Sis Alive   • Son Alive   • Bro Alive   • Sis Alive   • Sis Alive   • Son Alive   • Phill Alive     Family History   Problem Relation Age of Onset   • Cancer Mother         breast    • Diabetes Mother    • Heart Disease Mother 80        carotid disease and CEA   • Stroke Father    • Stroke Sister    • Genetic Son         autism        Review of Systems:   Constitutional: Negative for fever, chills, weight loss and malaise/fatigue.   HENT: Negative for ear pain, nosebleeds, congestion, sore throat and neck pain.    Eyes: Negative for blurred vision.   Respiratory: Negative for cough, sputum production, shortness of breath and wheezing.    Cardiovascular: Negative for chest pain, palpitations, orthopnea and leg swelling.   Gastrointestinal: Negative for heartburn, nausea, vomiting and abdominal pain.   Genitourinary: Negative for dysuria, urgency and frequency.   Musculoskeletal: Negative for myalgias, joint pain, and back pain.  Skin: Negative for rash and itching.   Neurological: Negative for dizziness, tingling, tremors, sensory change, focal weakness and headaches.   Endo/Heme/Allergies: Does not bruise/bleed easily.   Psychiatric/Behavioral: Negative for depression, suicidal ideas and memory loss.  Positive for insomnia.  All other systems reviewed and are negative except as in HPI.    Exam:  Blood pressure 130/70, pulse 72, temperature 36.8 °C (98.3 °F), resp. rate 16, height 1.549 m (5' 1\"), weight 49 kg (108 lb), SpO2 98 %.  General:  Well nourished, well developed female in NAD  Head: Grossly normal.  Neck: Supple without JVD or bruit. Thyroid is not enlarged.  Pulmonary: Clear to ausculation. Normal effort. No rales, ronchi, or wheezing.  Cardiovascular: Regular rate and rhythm without murmur.   Abdomen:  Bowel sounds + x 4. Soft, non-tender, nondistended.  Extremities: No clubbing, cyanosis, or edema.  Skin: " Intact with no obvious rashes or lesions.  Neuro: Grossly intact.  Psych: Alert and oriented x 3.  Mood and affect appropriate.    Medical decision-making and discussion: Anisa is here to establish with a new primary care provider.  We reviewed her past medical history and discussed her current medications.  She will try some otc benadryl. She will sign a records release for her previous provider, she will sign up with [facility], and she will plan to follow-up here as needed.         Assessment/Plan:  1. Encounter to establish care with new doctor     2. Insomnia, unspecified type         Return if symptoms worsen or fail to improve.    Please note that this dictation was created using voice recognition software. I have made every reasonable attempt to correct obvious errors, but I expect that there are errors of grammar and possibly content that I did not discover before finalizing the note.

## 2018-08-29 NOTE — ASSESSMENT & PLAN NOTE
Has been having some issues with sleep.  She is not interested in a prescription at this time.  Will try Benadryl.

## 2018-09-11 RX ORDER — IBUPROFEN 800 MG/1
TABLET ORAL
Qty: 60 TAB | Refills: 1 | Status: SHIPPED | OUTPATIENT
Start: 2018-09-11 | End: 2018-10-13 | Stop reason: SDUPTHER

## 2018-10-15 RX ORDER — IBUPROFEN 800 MG/1
TABLET ORAL
Qty: 60 TAB | Refills: 1 | Status: SHIPPED | OUTPATIENT
Start: 2018-10-15 | End: 2018-12-02 | Stop reason: SDUPTHER

## 2018-10-15 NOTE — TELEPHONE ENCOUNTER
Was the patient seen in the last year in this department? Yes    Does patient have an active prescription for medications requested? No     Received Request Via: Pharmacy      Pt met protocol?: Yes pt last ov 8/2018

## 2018-12-03 RX ORDER — IBUPROFEN 800 MG/1
TABLET ORAL
Qty: 60 TAB | Refills: 0 | Status: SHIPPED | OUTPATIENT
Start: 2018-12-03 | End: 2019-04-11

## 2018-12-03 NOTE — TELEPHONE ENCOUNTER
Was the patient seen in the last year in this department? Yes    Does patient have an active prescription for medications requested? No     Received Request Via: Pharmacy      Pt met protocol?: Yes    LAST OV 08/29/2018

## 2018-12-05 ENCOUNTER — APPOINTMENT (RX ONLY)
Dept: URBAN - METROPOLITAN AREA CLINIC 35 | Facility: CLINIC | Age: 58
Setting detail: DERMATOLOGY
End: 2018-12-05

## 2018-12-05 DIAGNOSIS — L82.1 OTHER SEBORRHEIC KERATOSIS: ICD-10-CM

## 2018-12-05 DIAGNOSIS — L11.1 TRANSIENT ACANTHOLYTIC DERMATOSIS [GROVER]: ICD-10-CM

## 2018-12-05 DIAGNOSIS — L81.4 OTHER MELANIN HYPERPIGMENTATION: ICD-10-CM

## 2018-12-05 DIAGNOSIS — D485 NEOPLASM OF UNCERTAIN BEHAVIOR OF SKIN: ICD-10-CM

## 2018-12-05 PROBLEM — D48.5 NEOPLASM OF UNCERTAIN BEHAVIOR OF SKIN: Status: ACTIVE | Noted: 2018-12-05

## 2018-12-05 PROCEDURE — ? MEDICATION COUNSELING

## 2018-12-05 PROCEDURE — ? PRESCRIPTION

## 2018-12-05 PROCEDURE — 99213 OFFICE O/P EST LOW 20 MIN: CPT

## 2018-12-05 PROCEDURE — ? OBSERVATION

## 2018-12-05 PROCEDURE — ? LIQUID NITROGEN (COSMETIC)

## 2018-12-05 PROCEDURE — ? ADDITIONAL NOTES

## 2018-12-05 PROCEDURE — ? COUNSELING

## 2018-12-05 RX ORDER — TRIAMCINOLONE ACETONIDE 1 MG/G
CREAM TOPICAL BID
Qty: 1 | Refills: 3 | Status: ERX | COMMUNITY
Start: 2018-12-05 | End: 2021-06-30

## 2018-12-05 RX ADMIN — TRIAMCINOLONE ACETONIDE 1: 1 CREAM TOPICAL at 00:00

## 2018-12-05 ASSESSMENT — LOCATION SIMPLE DESCRIPTION DERM
LOCATION SIMPLE: ABDOMEN
LOCATION SIMPLE: RIGHT CHEEK
LOCATION SIMPLE: LEFT BREAST
LOCATION SIMPLE: CHEST
LOCATION SIMPLE: LEFT CLAVICULAR SKIN
LOCATION SIMPLE: LEFT UPPER BACK
LOCATION SIMPLE: RIGHT CHEEK

## 2018-12-05 ASSESSMENT — LOCATION DETAILED DESCRIPTION DERM
LOCATION DETAILED: RIGHT LATERAL ABDOMEN
LOCATION DETAILED: LEFT CLAVICULAR SKIN
LOCATION DETAILED: RIGHT SUPERIOR MEDIAL MALAR CHEEK
LOCATION DETAILED: LEFT MEDIAL BREAST 8-9:00 REGION
LOCATION DETAILED: LEFT LATERAL ABDOMEN
LOCATION DETAILED: RIGHT SUPERIOR MEDIAL MALAR CHEEK
LOCATION DETAILED: XIPHOID
LOCATION DETAILED: LOWER STERNUM
LOCATION DETAILED: LEFT MID-UPPER BACK
LOCATION DETAILED: RIGHT LATERAL SUPERIOR CHEST
LOCATION DETAILED: LEFT LATERAL SUPERIOR CHEST
LOCATION DETAILED: LEFT INFRAMAMMARY CREASE (INNER QUADRANT)

## 2018-12-05 ASSESSMENT — LOCATION ZONE DERM
LOCATION ZONE: FACE
LOCATION ZONE: FACE
LOCATION ZONE: TRUNK

## 2018-12-05 NOTE — PROCEDURE: MEDICATION COUNSELING
SSKI Counseling:  I discussed with the patient the risks of SSKI including but not limited to thyroid abnormalities, metallic taste, GI upset, fever, headache, acne, arthralgias, paraesthesias, lymphadenopathy, easy bleeding, arrhythmias, and allergic reaction.
Azithromycin Counseling:  I discussed with the patient the risks of azithromycin including but not limited to GI upset, allergic reaction, drug rash, diarrhea, and yeast infections.
Quinolones Counseling:  I discussed with the patient the risks of fluoroquinolones including but not limited to GI upset, allergic reaction, drug rash, diarrhea, dizziness, photosensitivity, yeast infections, liver function test abnormalities, tendonitis/tendon rupture.
Topical Clindamycin Pregnancy And Lactation Text: This medication is Pregnancy Category B and is considered safe during pregnancy. It is unknown if it is excreted in breast milk.
Eucrisa Pregnancy And Lactation Text: This medication has not been assigned a Pregnancy Risk Category but animal studies failed to show danger with the topical medication. It is unknown if the medication is excreted in breast milk.
Isotretinoin Pregnancy And Lactation Text: This medication is Pregnancy Category X and is considered extremely dangerous during pregnancy. It is unknown if it is excreted in breast milk.
Albendazole Pregnancy And Lactation Text: This medication is Pregnancy Category C and it isn't known if it is safe during pregnancy. It is also excreted in breast milk.
Griseofulvin Counseling:  I discussed with the patient the risks of griseofulvin including but not limited to photosensitivity, cytopenia, liver damage, nausea/vomiting and severe allergy.  The patient understands that this medication is best absorbed when taken with a fatty meal (e.g., ice cream or french fries).
Protopic Counseling: Patient may experience a mild burning sensation during topical application. Protopic is not approved in children less than 2 years of age. There have been case reports of hematologic and skin malignancies in patients using topical calcineurin inhibitors although causality is questionable.
Methotrexate Pregnancy And Lactation Text: This medication is Pregnancy Category X and is known to cause fetal harm. This medication is excreted in breast milk.
Carac Counseling:  I discussed with the patient the risks of Carac including but not limited to erythema, scaling, itching, weeping, crusting, and pain.
Tremfya Counseling: I discussed with the patient the risks of guselkumab including but not limited to immunosuppression, serious infections, worsening of inflammatory bowel disease and drug reactions.  The patient understands that monitoring is required including a PPD at baseline and must alert us or the primary physician if symptoms of infection or other concerning signs are noted.
Protopic Pregnancy And Lactation Text: This medication is Pregnancy Category C. It is unknown if this medication is excreted in breast milk when applied topically.
Carac Pregnancy And Lactation Text: This medication is Pregnancy Category X and contraindicated in pregnancy and in women who may become pregnant. It is unknown if this medication is excreted in breast milk.
Tremfya Pregnancy And Lactation Text: The risk during pregnancy and breastfeeding is uncertain with this medication.
Prednisone Counseling:  I discussed with the patient the risks of prolonged use of prednisone including but not limited to weight gain, insomnia, osteoporosis, mood changes, diabetes, susceptibility to infection, glaucoma and high blood pressure.  In cases where prednisone use is prolonged, patients should be monitored with blood pressure checks, serum glucose levels and an eye exam.  Additionally, the patient may need to be placed on GI prophylaxis, PCP prophylaxis, and calcium and vitamin D supplementation and/or a bisphosphonate.  The patient verbalized understanding of the proper use and the possible adverse effects of prednisone.  All of the patient's questions and concerns were addressed.
Arava Pregnancy And Lactation Text: This medication is Pregnancy Category X and is absolutely contraindicated during pregnancy. It is unknown if it is excreted in breast milk.
Enbrel Pregnancy And Lactation Text: This medication is Pregnancy Category B and is considered safe during pregnancy. It is unknown if this medication is excreted in breast milk.
Cellcept Pregnancy And Lactation Text: This medication is Pregnancy Category D and isn't considered safe during pregnancy. It is unknown if this medication is excreted in breast milk.
Siliq Counseling:  I discussed with the patient the risks of Siliq including but not limited to new or worsening depression, suicidal thoughts and behavior, immunosuppression, malignancy, posterior leukoencephalopathy syndrome, and serious infections.  The patient understands that monitoring is required including a PPD at baseline and must alert us or the primary physician if symptoms of infection or other concerning signs are noted. There is also a special program designed to monitor depression which is required with Siliq.
Gabapentin Counseling: I discussed with the patient the risks of gabapentin including but not limited to dizziness, somnolence, fatigue and ataxia.
Otezla Counseling: The side effects of Otezla were discussed with the patient, including but not limited to worsening or new depression, weight loss, diarrhea, nausea, upper respiratory tract infection, and headache. Patient instructed to call the office should any adverse effect occur.  The patient verbalized understanding of the proper use and possible adverse effects of Otezla.  All the patient's questions and concerns were addressed.
Ivermectin Counseling:  Patient instructed to take medication on an empty stomach with a full glass of water.  Patient informed of potential adverse effects including but not limited to nausea, diarrhea, dizziness, itching, and swelling of the extremities or lymph nodes.  The patient verbalized understanding of the proper use and possible adverse effects of ivermectin.  All of the patient's questions and concerns were addressed.
Azithromycin Pregnancy And Lactation Text: This medication is considered safe during pregnancy and is also secreted in breast milk.
Topical Sulfur Applications Counseling: Topical Sulfur Counseling: Patient counseled that this medication may cause skin irritation or allergic reactions.  In the event of skin irritation, the patient was advised to reduce the amount of the drug applied or use it less frequently.   The patient verbalized understanding of the proper use and possible adverse effects of topical sulfur application.  All of the patient's questions and concerns were addressed.
Hydroquinone Counseling:  Patient advised that medication may result in skin irritation, lightening (hypopigmentation), dryness, and burning.  In the event of skin irritation, the patient was advised to reduce the amount of the drug applied or use it less frequently.  Rarely, spots that are treated with hydroquinone can become darker (pseudoochronosis).  Should this occur, patient instructed to stop medication and call the office. The patient verbalized understanding of the proper use and possible adverse effects of hydroquinone.  All of the patient's questions and concerns were addressed.
High Dose Vitamin A Counseling: Side effects reviewed, pt to contact office should one occur.
Clofazimine Counseling:  I discussed with the patient the risks of clofazimine including but not limited to skin and eye pigmentation, liver damage, nausea/vomiting, gastrointestinal bleeding and allergy.
High Dose Vitamin A Pregnancy And Lactation Text: High dose vitamin A therapy is contraindicated during pregnancy and breast feeding.
Doxycycline Counseling:  Patient counseled regarding possible photosensitivity and increased risk for sunburn.  Patient instructed to avoid sunlight, if possible.  When exposed to sunlight, patients should wear protective clothing, sunglasses, and sunscreen.  The patient was instructed to call the office immediately if the following severe adverse effects occur:  hearing changes, easy bruising/bleeding, severe headache, or vision changes.  The patient verbalized understanding of the proper use and possible adverse effects of doxycycline.  All of the patient's questions and concerns were addressed.
Solaraze Counseling:  I discussed with the patient the risks of Solaraze including but not limited to erythema, scaling, itching, weeping, crusting, and pain.
Xeljanz Counseling: I discussed with the patient the risks of Xeljanz therapy including increased risk of infection, liver issues, headache, diarrhea, or cold symptoms. Live vaccines should be avoided. They were instructed to call if they have any problems.
Gabapentin Pregnancy And Lactation Text: This medication is Pregnancy Category C and isn't considered safe during pregnancy. It is excreted in breast milk.
5-Fu Counseling: 5-Fluorouracil Counseling:  I discussed with the patient the risks of 5-fluorouracil including but not limited to erythema, scaling, itching, weeping, crusting, and pain.
Sski Pregnancy And Lactation Text: This medication is Pregnancy Category D and isn't considered safe during pregnancy. It is excreted in breast milk.
Cimetidine Counseling:  I discussed with the patient the risks of Cimetidine including but not limited to gynecomastia, headache, diarrhea, nausea, drowsiness, arrhythmias, pancreatitis, skin rashes, psychosis, bone marrow suppression and kidney toxicity.
Humira Counseling:  I discussed with the patient the risks of adalimumab including but not limited to myelosuppression, immunosuppression, autoimmune hepatitis, demyelinating diseases, lymphoma, and serious infections.  The patient understands that monitoring is required including a PPD at baseline and must alert us or the primary physician if symptoms of infection or other concerning signs are noted.
Quinolones Pregnancy And Lactation Text: This medication is Pregnancy Category C and it isn't know if it is safe during pregnancy. It is also excreted in breast milk.
Cyclophosphamide Counseling:  I discussed with the patient the risks of cyclophosphamide including but not limited to hair loss, hormonal abnormalities, decreased fertility, abdominal pain, diarrhea, nausea and vomiting, bone marrow suppression and infection. The patient understands that monitoring is required while taking this medication.
Cyclophosphamide Pregnancy And Lactation Text: This medication is Pregnancy Category D and it isn't considered safe during pregnancy. This medication is excreted in breast milk.
Rifampin Counseling: I discussed with the patient the risks of rifampin including but not limited to liver damage, kidney damage, red-orange body fluids, nausea/vomiting and severe allergy.
Simponi Counseling:  I discussed with the patient the risks of golimumab including but not limited to myelosuppression, immunosuppression, autoimmune hepatitis, demyelinating diseases, lymphoma, and serious infections.  The patient understands that monitoring is required including a PPD at baseline and must alert us or the primary physician if symptoms of infection or other concerning signs are noted.
Itraconazole Counseling:  I discussed with the patient the risks of itraconazole including but not limited to liver damage, nausea/vomiting, neuropathy, and severe allergy.  The patient understands that this medication is best absorbed when taken with acidic beverages such as non-diet cola or ginger ale.  The patient understands that monitoring is required including baseline LFTs and repeat LFTs at intervals.  The patient understands that they are to contact us or the primary physician if concerning signs are noted.
Erythromycin Counseling:  I discussed with the patient the risks of erythromycin including but not limited to GI upset, allergic reaction, drug rash, diarrhea, increase in liver enzymes, and yeast infections.
Cimzia Counseling:  I discussed with the patient the risks of Cimzia including but not limited to immunosuppression, allergic reactions and infections.  The patient understands that monitoring is required including a PPD at baseline and must alert us or the primary physician if symptoms of infection or other concerning signs are noted.
Wartpeel Counseling:  I discussed with the patient the risks of Wartpeel including but not limited to erythema, scaling, itching, weeping, crusting, and pain.
Prednisone Pregnancy And Lactation Text: This medication is Pregnancy Category C and it isn't know if it is safe during pregnancy. This medication is excreted in breast milk.
Glycopyrrolate Pregnancy And Lactation Text: This medication is Pregnancy Category B and is considered safe during pregnancy. It is unknown if it is excreted breast milk.
Topical Sulfur Applications Pregnancy And Lactation Text: This medication is Pregnancy Category C and has an unknown safety profile during pregnancy. It is unknown if this topical medication is excreted in breast milk.
Use Enhanced Medication Counseling?: No
Griseofulvin Pregnancy And Lactation Text: This medication is Pregnancy Category X and is known to cause serious birth defects. It is unknown if this medication is excreted in breast milk but breast feeding should be avoided.
Doxycycline Pregnancy And Lactation Text: This medication is Pregnancy Category D and not consider safe during pregnancy. It is also excreted in breast milk but is considered safe for shorter treatment courses.
Glycopyrrolate Counseling:  I discussed with the patient the risks of glycopyrrolate including but not limited to skin rash, drowsiness, dry mouth, difficulty urinating, and blurred vision.
Thalidomide Counseling: I discussed with the patient the risks of thalidomide including but not limited to birth defects, anxiety, weakness, chest pain, dizziness, cough and severe allergy.
Imiquimod Counseling:  I discussed with the patient the risks of imiquimod including but not limited to erythema, scaling, itching, weeping, crusting, and pain.  Patient understands that the inflammatory response to imiquimod is variable from person to person and was educated regarded proper titration schedule.  If flu-like symptoms develop, patient knows to discontinue the medication and contact us.
Otezla Pregnancy And Lactation Text: This medication is Pregnancy Category C and it isn't known if it is safe during pregnancy. It is unknown if it is excreted in breast milk.
Cimetidine Pregnancy And Lactation Text: This medication is Pregnancy Category B and is considered safe during pregnancy. It is also excreted in breast milk and breast feeding isn't recommended.
Detail Level: Simple
Xelubaldoz Pregnancy And Lactation Text: This medication is Pregnancy Category D and is not considered safe during pregnancy.  The risk during breast feeding is also uncertain.
Xolair Counseling:  Patient informed of potential adverse effects including but not limited to fever, muscle aches, rash and allergic reactions.  The patient verbalized understanding of the proper use and possible adverse effects of Xolair.  All of the patient's questions and concerns were addressed.
Erythromycin Pregnancy And Lactation Text: This medication is Pregnancy Category B and is considered safe during pregnancy. It is also excreted in breast milk.
Ilumya Counseling: I discussed with the patient the risks of tildrakizumab including but not limited to immunosuppression, malignancy, posterior leukoencephalopathy syndrome, and serious infections.  The patient understands that monitoring is required including a PPD at baseline and must alert us or the primary physician if symptoms of infection or other concerning signs are noted.
Bactrim Pregnancy And Lactation Text: This medication is Pregnancy Category D and is known to cause fetal risk.  It is also excreted in breast milk.
Cimzia Pregnancy And Lactation Text: This medication crosses the placenta but can be considered safe in certain situations. Cimzia may be excreted in breast milk.
Solaraze Pregnancy And Lactation Text: This medication is Pregnancy Category B and is considered safe. There is some data to suggest avoiding during the third trimester. It is unknown if this medication is excreted in breast milk.
Doxepin Counseling:  Patient advised that the medication is sedating and not to drive a car after taking this medication. Patient informed of potential adverse effects including but not limited to dry mouth, urinary retention, and blurry vision.  The patient verbalized understanding of the proper use and possible adverse effects of doxepin.  All of the patient's questions and concerns were addressed.
Colchicine Counseling:  Patient counseled regarding adverse effects including but not limited to stomach upset (nausea, vomiting, stomach pain, or diarrhea).  Patient instructed to limit alcohol consumption while taking this medication.  Colchicine may reduce blood counts especially with prolonged use.  The patient understands that monitoring of kidney function and blood counts may be required, especially at baseline. The patient verbalized understanding of the proper use and possible adverse effects of colchicine.  All of the patient's questions and concerns were addressed.
Bactrim Counseling:  I discussed with the patient the risks of sulfa antibiotics including but not limited to GI upset, allergic reaction, drug rash, diarrhea, dizziness, photosensitivity, and yeast infections.  Rarely, more serious reactions can occur including but not limited to aplastic anemia, agranulocytosis, methemoglobinemia, blood dyscrasias, liver or kidney failure, lung infiltrates or desquamative/blistering drug rashes.
Topical Retinoid counseling:  Patient advised to apply a pea-sized amount only at bedtime and wait 30 minutes after washing their face before applying.  If too drying, patient may add a non-comedogenic moisturizer. The patient verbalized understanding of the proper use and possible adverse effects of retinoids.  All of the patient's questions and concerns were addressed.
Oxybutynin Counseling:  I discussed with the patient the risks of oxybutynin including but not limited to skin rash, drowsiness, dry mouth, difficulty urinating, and blurred vision.
Drysol Counseling:  I discussed with the patient the risks of drysol/aluminum chloride including but not limited to skin rash, itching, irritation, burning.
Acitretin Counseling:  I discussed with the patient the risks of acitretin including but not limited to hair loss, dry lips/skin/eyes, liver damage, hyperlipidemia, depression/suicidal ideation, photosensitivity.  Serious rare side effects can include but are not limited to pancreatitis, pseudotumor cerebri, bony changes, clot formation/stroke/heart attack.  Patient understands that alcohol is contraindicated since it can result in liver toxicity and significantly prolong the elimination of the drug by many years.
Imiquimod Pregnancy And Lactation Text: This medication is Pregnancy Category C. It is unknown if this medication is excreted in breast milk.
Rifampin Pregnancy And Lactation Text: This medication is Pregnancy Category C and it isn't know if it is safe during pregnancy. It is also excreted in breast milk and should not be used if you are breast feeding.
Minoxidil Counseling: Minoxidil is a topical medication which can increase blood flow where it is applied. It is uncertain how this medication increases hair growth. Side effects are uncommon and include stinging and allergic reactions.
Ketoconazole Counseling:   Patient counseled regarding improving absorption with orange juice.  Adverse effects include but are not limited to breast enlargement, headache, diarrhea, nausea, upset stomach, liver function test abnormalities, taste disturbance, and stomach pain.  There is a rare possibility of liver failure that can occur when taking ketoconazole. The patient understands that monitoring of LFTs may be required, especially at baseline. The patient verbalized understanding of the proper use and possible adverse effects of ketoconazole.  All of the patient's questions and concerns were addressed.
Stelara Counseling:  I discussed with the patient the risks of ustekinumab including but not limited to immunosuppression, malignancy, posterior leukoencephalopathy syndrome, and serious infections.  The patient understands that monitoring is required including a PPD at baseline and must alert us or the primary physician if symptoms of infection or other concerning signs are noted.
Cosentyx Counseling:  I discussed with the patient the risks of Cosentyx including but not limited to worsening of Crohn's disease, immunosuppression, allergic reactions and infections.  The patient understands that monitoring is required including a PPD at baseline and must alert us or the primary physician if symptoms of infection or other concerning signs are noted.
Dapsone Counseling: I discussed with the patient the risks of dapsone including but not limited to hemolytic anemia, agranulocytosis, rashes, methemoglobinemia, kidney failure, peripheral neuropathy, headaches, GI upset, and liver toxicity.  Patients who start dapsone require monitoring including baseline LFTs and weekly CBCs for the first month, then every month thereafter.  The patient verbalized understanding of the proper use and possible adverse effects of dapsone.  All of the patient's questions and concerns were addressed.
Birth Control Pills Counseling: Birth Control Pill Counseling: I discussed with the patient the potential side effects of OCPs including but not limited to increased risk of stroke, heart attack, thrombophlebitis, deep venous thrombosis, hepatic adenomas, breast changes, GI upset, headaches, and depression.  The patient verbalized understanding of the proper use and possible adverse effects of OCPs. All of the patient's questions and concerns were addressed.
Xolair Pregnancy And Lactation Text: This medication is Pregnancy Category B and is considered safe during pregnancy. This medication is excreted in breast milk.
Zyclara Counseling:  I discussed with the patient the risks of imiquimod including but not limited to erythema, scaling, itching, weeping, crusting, and pain.  Patient understands that the inflammatory response to imiquimod is variable from person to person and was educated regarded proper titration schedule.  If flu-like symptoms develop, patient knows to discontinue the medication and contact us.
Metronidazole Counseling:  I discussed with the patient the risks of metronidazole including but not limited to seizures, nausea/vomiting, a metallic taste in the mouth, nausea/vomiting and severe allergy.
Hydroxychloroquine Pregnancy And Lactation Text: This medication has been shown to cause fetal harm but it isn't assigned a Pregnancy Risk Category. There are small amounts excreted in breast milk.
Infliximab Counseling:  I discussed with the patient the risks of infliximab including but not limited to myelosuppression, immunosuppression, autoimmune hepatitis, demyelinating diseases, lymphoma, and serious infections.  The patient understands that monitoring is required including a PPD at baseline and must alert us or the primary physician if symptoms of infection or other concerning signs are noted.
Hydroxychloroquine Counseling:  I discussed with the patient that a baseline ophthalmologic exam is needed at the start of therapy and every year thereafter while on therapy. A CBC may also be warranted for monitoring.  The side effects of this medication were discussed with the patient, including but not limited to agranulocytosis, aplastic anemia, seizures, rashes, retinopathy, and liver toxicity. Patient instructed to call the office should any adverse effect occur.  The patient verbalized understanding of the proper use and possible adverse effects of Plaquenil.  All the patient's questions and concerns were addressed.
Valtrex Counseling: I discussed with the patient the risks of valacyclovir including but not limited to kidney damage, nausea, vomiting and severe allergy.  The patient understands that if the infection seems to be worsening or is not improving, they are to call.
Doxepin Pregnancy And Lactation Text: This medication is Pregnancy Category C and it isn't known if it is safe during pregnancy. It is also excreted in breast milk and breast feeding isn't recommended.
Tetracycline Counseling: Patient counseled regarding possible photosensitivity and increased risk for sunburn.  Patient instructed to avoid sunlight, if possible.  When exposed to sunlight, patients should wear protective clothing, sunglasses, and sunscreen.  The patient was instructed to call the office immediately if the following severe adverse effects occur:  hearing changes, easy bruising/bleeding, severe headache, or vision changes.  The patient verbalized understanding of the proper use and possible adverse effects of tetracycline.  All of the patient's questions and concerns were addressed. Patient understands to avoid pregnancy while on therapy due to potential birth defects.
Drysol Pregnancy And Lactation Text: This medication is considered safe during pregnancy and breast feeding.
Acitretin Pregnancy And Lactation Text: This medication is Pregnancy Category X and should not be given to women who are pregnant or may become pregnant in the future. This medication is excreted in breast milk.
Tazorac Counseling:  Patient advised that medication is irritating and drying.  Patient may need to apply sparingly and wash off after an hour before eventually leaving it on overnight.  The patient verbalized understanding of the proper use and possible adverse effects of tazorac.  All of the patient's questions and concerns were addressed.
Elidel Counseling: Patient may experience a mild burning sensation during topical application. Elidel is not approved in children less than 2 years of age. There have been case reports of hematologic and skin malignancies in patients using topical calcineurin inhibitors although causality is questionable.
Dapsone Pregnancy And Lactation Text: This medication is Pregnancy Category C and is not considered safe during pregnancy or breast feeding.
Bexarotene Counseling:  I discussed with the patient the risks of bexarotene including but not limited to hair loss, dry lips/skin/eyes, liver abnormalities, hyperlipidemia, pancreatitis, depression/suicidal ideation, photosensitivity, drug rash/allergic reactions, hypothyroidism, anemia, leukopenia, infection, cataracts, and teratogenicity.  Patient understands that they will need regular blood tests to check lipid profile, liver function tests, white blood cell count, thyroid function tests and pregnancy test if applicable.
Ketoconazole Pregnancy And Lactation Text: This medication is Pregnancy Category C and it isn't know if it is safe during pregnancy. It is also excreted in breast milk and breast feeding isn't recommended.
Birth Control Pills Pregnancy And Lactation Text: This medication should be avoided if pregnant and for the first 30 days post-partum.
Metronidazole Pregnancy And Lactation Text: This medication is Pregnancy Category B and considered safe during pregnancy.  It is also excreted in breast milk.
Nsaids Counseling: NSAID Counseling: I discussed with the patient that NSAIDs should be taken with food. Prolonged use of NSAIDs can result in the development of stomach ulcers.  Patient advised to stop taking NSAIDs if abdominal pain occurs.  The patient verbalized understanding of the proper use and possible adverse effects of NSAIDs.  All of the patient's questions and concerns were addressed.
Hydroxyzine Pregnancy And Lactation Text: This medication is not safe during pregnancy and should not be taken. It is also excreted in breast milk and breast feeding isn't recommended.
Cephalexin Pregnancy And Lactation Text: This medication is Pregnancy Category B and considered safe during pregnancy.  It is also excreted in breast milk but can be used safely for shorter doses.
Tetracycline Pregnancy And Lactation Text: This medication is Pregnancy Category D and not consider safe during pregnancy. It is also excreted in breast milk.
Valtrex Pregnancy And Lactation Text: this medication is Pregnancy Category B and is considered safe during pregnancy. This medication is not directly found in breast milk but it's metabolite acyclovir is present.
Cephalexin Counseling: I counseled the patient regarding use of cephalexin as an antibiotic for prophylactic and/or therapeutic purposes. Cephalexin (commonly prescribed under brand name Keflex) is a cephalosporin antibiotic which is active against numerous classes of bacteria, including most skin bacteria. Side effects may include nausea, diarrhea, gastrointestinal upset, rash, hives, yeast infections, and in rare cases, hepatitis, kidney disease, seizures, fever, confusion, neurologic symptoms, and others. Patients with severe allergies to penicillin medications are cautioned that there is about a 10% incidence of cross-reactivity with cephalosporins. When possible, patients with penicillin allergies should use alternatives to cephalosporins for antibiotic therapy.
Taltz Counseling: I discussed with the patient the risks of ixekizumab including but not limited to immunosuppression, serious infections, worsening of inflammatory bowel disease and drug reactions.  The patient understands that monitoring is required including a PPD at baseline and must alert us or the primary physician if symptoms of infection or other concerning signs are noted.
Dupixent Counseling: I discussed with the patient the risks of dupilumab including but not limited to eye infection and irritation, cold sores, injection site reactions, worsening of asthma, allergic reactions and increased risk of parasitic infection.  Live vaccines should be avoided while taking dupilumab. Dupilumab will also interact with certain medications such as warfarin and cyclosporine. The patient understands that monitoring is required and they must alert us or the primary physician if symptoms of infection or other concerning signs are noted.
Hydroxyzine Counseling: Patient advised that the medication is sedating and not to drive a car after taking this medication.  Patient informed of potential adverse effects including but not limited to dry mouth, urinary retention, and blurry vision.  The patient verbalized understanding of the proper use and possible adverse effects of hydroxyzine.  All of the patient's questions and concerns were addressed.
Cyclosporine Counseling:  I discussed with the patient the risks of cyclosporine including but not limited to hypertension, gingival hyperplasia,myelosuppression, immunosuppression, liver damage, kidney damage, neurotoxicity, lymphoma, and serious infections. The patient understands that monitoring is required including baseline blood pressure, CBC, CMP, lipid panel and uric acid, and then 1-2 times monthly CMP and blood pressure.
Picato Counseling:  I discussed with the patient the risks of Picato including but not limited to erythema, scaling, itching, weeping, crusting, and pain.
Terbinafine Counseling: Patient counseling regarding adverse effects of terbinafine including but not limited to headache, diarrhea, rash, upset stomach, liver function test abnormalities, itching, taste/smell disturbance, nausea, abdominal pain, and flatulence.  There is a rare possibility of liver failure that can occur when taking terbinafine.  The patient understands that a baseline LFT and kidney function test may be required. The patient verbalized understanding of the proper use and possible adverse effects of terbinafine.  All of the patient's questions and concerns were addressed.
Minocycline Counseling: Patient advised regarding possible photosensitivity and discoloration of the teeth, skin, lips, tongue and gums.  Patient instructed to avoid sunlight, if possible.  When exposed to sunlight, patients should wear protective clothing, sunglasses, and sunscreen.  The patient was instructed to call the office immediately if the following severe adverse effects occur:  hearing changes, easy bruising/bleeding, severe headache, or vision changes.  The patient verbalized understanding of the proper use and possible adverse effects of minocycline.  All of the patient's questions and concerns were addressed.
Nsaids Pregnancy And Lactation Text: These medications are considered safe up to 30 weeks gestation. It is excreted in breast milk.
Tazorac Pregnancy And Lactation Text: This medication is not safe during pregnancy. It is unknown if this medication is excreted in breast milk.
Azathioprine Counseling:  I discussed with the patient the risks of azathioprine including but not limited to myelosuppression, immunosuppression, hepatotoxicity, lymphoma, and infections.  The patient understands that monitoring is required including baseline LFTs, Creatinine, possible TPMP genotyping and weekly CBCs for the first month and then every 2 weeks thereafter.  The patient verbalized understanding of the proper use and possible adverse effects of azathioprine.  All of the patient's questions and concerns were addressed.
Bexarotene Pregnancy And Lactation Text: This medication is Pregnancy Category X and should not be given to women who are pregnant or may become pregnant. This medication should not be used if you are breast feeding.
Spironolactone Counseling: Patient advised regarding risks of diarrhea, abdominal pain, hyperkalemia, birth defects (for female patients), liver toxicity and renal toxicity. The patient may need blood work to monitor liver and kidney function and potassium levels while on therapy. The patient verbalized understanding of the proper use and possible adverse effects of spironolactone.  All of the patient's questions and concerns were addressed.
Benzoyl Peroxide Counseling: Patient counseled that medicine may cause skin irritation and bleach clothing.  In the event of skin irritation, the patient was advised to reduce the amount of the drug applied or use it less frequently.   The patient verbalized understanding of the proper use and possible adverse effects of benzoyl peroxide.  All of the patient's questions and concerns were addressed.
Rituxan Counseling:  I discussed with the patient the risks of Rituxan infusions. Side effects can include infusion reactions, severe drug rashes including mucocutaneous reactions, reactivation of latent hepatitis and other infections and rarely progressive multifocal leukoencephalopathy.  All of the patient's questions and concerns were addressed.
Dupixent Pregnancy And Lactation Text: This medication likely crosses the placenta but the risk for the fetus is uncertain. This medication is excreted in breast milk.
Erivedge Counseling- I discussed with the patient the risks of Erivedge including but not limited to nausea, vomiting, diarrhea, constipation, weight loss, changes in the sense of taste, decreased appetite, muscle spasms, and hair loss.  The patient verbalized understanding of the proper use and possible adverse effects of Erivedge.  All of the patient's questions and concerns were addressed.
Spironolactone Pregnancy And Lactation Text: This medication can cause feminization of the male fetus and should be avoided during pregnancy. The active metabolite is also found in breast milk.
Albendazole Counseling:  I discussed with the patient the risks of albendazole including but not limited to cytopenia, kidney damage, nausea/vomiting and severe allergy.  The patient understands that this medication is being used in an off-label manner.
Benzoyl Peroxide Pregnancy And Lactation Text: This medication is Pregnancy Category C. It is unknown if benzoyl peroxide is excreted in breast milk.
Methotrexate Counseling:  Patient counseled regarding adverse effects of methotrexate including but not limited to nausea, vomiting, abnormalities in liver function tests. Patients may develop mouth sores, rash, diarrhea, and abnormalities in blood counts. The patient understands that monitoring is required including LFT's and blood counts.  There is a rare possibility of scarring of the liver and lung problems that can occur when taking methotrexate. Persistent nausea, loss of appetite, pale stools, dark urine, cough, and shortness of breath should be reported immediately. Patient advised to discontinue methotrexate treatment at least three months before attempting to become pregnant.  I discussed the need for folate supplements while taking methotrexate.  These supplements can decrease side effects during methotrexate treatment. The patient verbalized understanding of the proper use and possible adverse effects of methotrexate.  All of the patient's questions and concerns were addressed.
Clindamycin Pregnancy And Lactation Text: This medication can be used in pregnancy if certain situations. Clindamycin is also present in breast milk.
Eucrisa Counseling: Patient may experience a mild burning sensation during topical application. Eucrisa is not approved in children less than 2 years of age.
Isotretinoin Counseling: Patient should get monthly blood tests, not donate blood, not drive at night if vision affected, not share medication, and not undergo elective surgery for 6 months after tx completed. Side effects reviewed, pt to contact office should one occur.
Cellcept Counseling:  I discussed with the patient the risks of mycophenolate mofetil including but not limited to infection/immunosuppression, GI upset, hypokalemia, hypercholesterolemia, bone marrow suppression, lymphoproliferative disorders, malignancy, GI ulceration/bleed/perforation, colitis, interstitial lung disease, kidney failure, progressive multifocal leukoencephalopathy, and birth defects.  The patient understands that monitoring is required including a baseline creatinine and regular CBC testing. In addition, patient must alert us immediately if symptoms of infection or other concerning signs are noted.
Fluconazole Counseling:  Patient counseled regarding adverse effects of fluconazole including but not limited to headache, diarrhea, nausea, upset stomach, liver function test abnormalities, taste disturbance, and stomach pain.  There is a rare possibility of liver failure that can occur when taking fluconazole.  The patient understands that monitoring of LFTs and kidney function test may be required, especially at baseline. The patient verbalized understanding of the proper use and possible adverse effects of fluconazole.  All of the patient's questions and concerns were addressed.
Clindamycin Counseling: I counseled the patient regarding use of clindamycin as an antibiotic for prophylactic and/or therapeutic purposes. Clindamycin is active against numerous classes of bacteria, including skin bacteria. Side effects may include nausea, diarrhea, gastrointestinal upset, rash, hives, yeast infections, and in rare cases, colitis.
Rituxan Pregnancy And Lactation Text: This medication is Pregnancy Category C and it isn't know if it is safe during pregnancy. It is unknown if this medication is excreted in breast milk but similar antibodies are known to be excreted.
Enbrel Counseling:  I discussed with the patient the risks of etanercept including but not limited to myelosuppression, immunosuppression, autoimmune hepatitis, demyelinating diseases, lymphoma, and infections.  The patient understands that monitoring is required including a PPD at baseline and must alert us or the primary physician if symptoms of infection or other concerning signs are noted.
Arava Counseling:  Patient counseled regarding adverse effects of Arava including but not limited to nausea, vomiting, abnormalities in liver function tests. Patients may develop mouth sores, rash, diarrhea, and abnormalities in blood counts. The patient understands that monitoring is required including LFTs and blood counts.  There is a rare possibility of scarring of the liver and lung problems that can occur when taking methotrexate. Persistent nausea, loss of appetite, pale stools, dark urine, cough, and shortness of breath should be reported immediately. Patient advised to discontinue Arava treatment and consult with a physician prior to attempting conception. The patient will have to undergo a treatment to eliminate Arava from the body prior to conception.
Odomzo Counseling- I discussed with the patient the risks of Odomzo including but not limited to nausea, vomiting, diarrhea, constipation, weight loss, changes in the sense of taste, decreased appetite, muscle spasms, and hair loss.  The patient verbalized understanding of the proper use and possible adverse effects of Odomzo.  All of the patient's questions and concerns were addressed.
Topical Clindamycin Counseling: Patient counseled that this medication may cause skin irritation or allergic reactions.  In the event of skin irritation, the patient was advised to reduce the amount of the drug applied or use it less frequently.   The patient verbalized understanding of the proper use and possible adverse effects of clindamycin.  All of the patient's questions and concerns were addressed.

## 2018-12-05 NOTE — PROCEDURE: ADDITIONAL NOTES
Additional Notes: Recommend evaluation and treatment with Dr. George Lopez (ophthalmologist) \\nPreviously procedure, fat transplant to lower eyelids
Detail Level: Simple
Additional Notes: Refer to Dr Lopez

## 2018-12-05 NOTE — PROCEDURE: OBSERVATION
X Size Of Lesion In Cm (Optional): 0.3
Detail Level: Detailed
Size Of Lesion In Cm (Optional): 0
Morphology Per Location (Optional): Soft flesh toned nodule

## 2019-04-10 ENCOUNTER — TELEPHONE (OUTPATIENT)
Dept: MEDICAL GROUP | Facility: PHYSICIAN GROUP | Age: 59
End: 2019-04-10

## 2019-04-10 ENCOUNTER — TELEPHONE (OUTPATIENT)
Dept: SCHEDULING | Facility: IMAGING CENTER | Age: 59
End: 2019-04-10

## 2019-04-11 ENCOUNTER — OFFICE VISIT (OUTPATIENT)
Dept: MEDICAL GROUP | Facility: PHYSICIAN GROUP | Age: 59
End: 2019-04-11
Payer: COMMERCIAL

## 2019-04-11 ENCOUNTER — HOSPITAL ENCOUNTER (OUTPATIENT)
Dept: RADIOLOGY | Facility: MEDICAL CENTER | Age: 59
End: 2019-04-11
Attending: NURSE PRACTITIONER
Payer: COMMERCIAL

## 2019-04-11 VITALS
SYSTOLIC BLOOD PRESSURE: 120 MMHG | HEART RATE: 76 BPM | HEIGHT: 61 IN | DIASTOLIC BLOOD PRESSURE: 80 MMHG | OXYGEN SATURATION: 98 % | WEIGHT: 110 LBS | TEMPERATURE: 98.7 F | RESPIRATION RATE: 16 BRPM | BODY MASS INDEX: 20.77 KG/M2

## 2019-04-11 DIAGNOSIS — R06.02 SHORTNESS OF BREATH: ICD-10-CM

## 2019-04-11 DIAGNOSIS — G47.00 INSOMNIA, UNSPECIFIED TYPE: ICD-10-CM

## 2019-04-11 PROCEDURE — 99214 OFFICE O/P EST MOD 30 MIN: CPT | Performed by: NURSE PRACTITIONER

## 2019-04-11 PROCEDURE — 71046 X-RAY EXAM CHEST 2 VIEWS: CPT

## 2019-04-11 RX ORDER — VALACYCLOVIR HYDROCHLORIDE 500 MG/1
500 TABLET, FILM COATED ORAL
Refills: 3 | COMMUNITY
Start: 2019-03-06

## 2019-04-11 RX ORDER — METHYLPREDNISOLONE 4 MG/1
TABLET ORAL
Refills: 0 | COMMUNITY
Start: 2019-02-21 | End: 2019-04-11

## 2019-04-11 RX ORDER — ASPIRIN 81 MG/1
TABLET ORAL
COMMUNITY
Start: 2019-01-01 | End: 2019-04-11

## 2019-04-11 RX ORDER — DIAZEPAM 10 MG/1
TABLET ORAL
Refills: 0 | COMMUNITY
Start: 2019-02-19 | End: 2019-04-11

## 2019-04-11 RX ORDER — DIAZEPAM 5 MG/1
5 TABLET ORAL NIGHTLY PRN
Qty: 30 TAB | Refills: 0 | Status: SHIPPED | OUTPATIENT
Start: 2019-04-11 | End: 2019-05-11

## 2019-04-11 ASSESSMENT — PATIENT HEALTH QUESTIONNAIRE - PHQ9
5. POOR APPETITE OR OVEREATING: 0 - NOT AT ALL
CLINICAL INTERPRETATION OF PHQ2 SCORE: 2
SUM OF ALL RESPONSES TO PHQ QUESTIONS 1-9: 11

## 2019-04-16 PROBLEM — Z00.00 WELL ADULT ON ROUTINE HEALTH CHECK: Status: RESOLVED | Noted: 2018-08-21 | Resolved: 2019-04-16

## 2019-04-16 PROBLEM — R06.02 SHORTNESS OF BREATH: Status: ACTIVE | Noted: 2019-04-16
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
## 2019-04-16 NOTE — ASSESSMENT & PLAN NOTE
Hasn't been able to sleep since this occurred.  Believes that it is situational.  Discussed options, and she would like a small prescription for Valium to keep on hand for use if needed.
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
## 2019-05-20 ENCOUNTER — HOSPITAL ENCOUNTER (OUTPATIENT)
Dept: RADIOLOGY | Facility: MEDICAL CENTER | Age: 59
End: 2019-05-20
Attending: NURSE PRACTITIONER
Payer: COMMERCIAL

## 2019-05-20 DIAGNOSIS — Z12.31 VISIT FOR SCREENING MAMMOGRAM: ICD-10-CM

## 2019-05-20 PROCEDURE — 77063 BREAST TOMOSYNTHESIS BI: CPT

## 2019-05-28 ENCOUNTER — APPOINTMENT (RX ONLY)
Dept: URBAN - METROPOLITAN AREA CLINIC 35 | Facility: CLINIC | Age: 59
Setting detail: DERMATOLOGY
End: 2019-05-28

## 2019-05-28 DIAGNOSIS — B35.1 TINEA UNGUIUM: ICD-10-CM

## 2019-05-28 DIAGNOSIS — B35.3 TINEA PEDIS: ICD-10-CM

## 2019-05-28 DIAGNOSIS — L11.1 TRANSIENT ACANTHOLYTIC DERMATOSIS [GROVER]: ICD-10-CM

## 2019-05-28 DIAGNOSIS — L82.1 OTHER SEBORRHEIC KERATOSIS: ICD-10-CM

## 2019-05-28 DIAGNOSIS — L64.8 OTHER ANDROGENIC ALOPECIA: ICD-10-CM | Status: UNCHANGED

## 2019-05-28 PROBLEM — L30.9 DERMATITIS, UNSPECIFIED: Status: ACTIVE | Noted: 2019-05-28

## 2019-05-28 PROCEDURE — ? PRESCRIPTION

## 2019-05-28 PROCEDURE — ? ADDITIONAL NOTES

## 2019-05-28 PROCEDURE — ? MEDICATION COUNSELING

## 2019-05-28 PROCEDURE — 99214 OFFICE O/P EST MOD 30 MIN: CPT

## 2019-05-28 PROCEDURE — ? COUNSELING

## 2019-05-28 RX ORDER — KETOCONAZOLE 20 MG/G
1 CREAM TOPICAL BID
Qty: 1 | Refills: 11 | Status: ERX | COMMUNITY
Start: 2019-05-28

## 2019-05-28 RX ORDER — TRIAMCINOLONE ACETONIDE 1 MG/G
TWICE DAILY CREAM TOPICAL BID
Qty: 1 | Refills: 3 | Status: ERX

## 2019-05-28 RX ORDER — DESONIDE 0.5 MG/G
1 CREAM TOPICAL TWICE A DAY
Qty: 1 | Refills: 3 | Status: ERX | COMMUNITY
Start: 2019-05-28

## 2019-05-28 RX ADMIN — DESONIDE 1: 0.5 CREAM TOPICAL at 00:00

## 2019-05-28 RX ADMIN — KETOCONAZOLE 1: 20 CREAM TOPICAL at 00:00

## 2019-05-28 ASSESSMENT — LOCATION SIMPLE DESCRIPTION DERM
LOCATION SIMPLE: RIGHT BREAST
LOCATION SIMPLE: SCALP
LOCATION SIMPLE: LEFT PLANTAR SURFACE
LOCATION SIMPLE: LEFT GREAT TOE
LOCATION SIMPLE: ABDOMEN
LOCATION SIMPLE: RIGHT PLANTAR SURFACE
LOCATION SIMPLE: RIGHT 2ND TOE
LOCATION SIMPLE: LEFT BREAST
LOCATION SIMPLE: CHEST
LOCATION SIMPLE: LEFT HEEL
LOCATION SIMPLE: RIGHT HEEL

## 2019-05-28 ASSESSMENT — LOCATION DETAILED DESCRIPTION DERM
LOCATION DETAILED: RIGHT 2ND TOENAIL
LOCATION DETAILED: LEFT INSTEP
LOCATION DETAILED: LEFT GREAT TOENAIL
LOCATION DETAILED: RIGHT MEDIAL SUPERIOR CHEST
LOCATION DETAILED: RIGHT HEEL
LOCATION DETAILED: LEFT RIB CAGE
LOCATION DETAILED: RIGHT LATERAL PLANTAR MIDFOOT
LOCATION DETAILED: LEFT HEEL
LOCATION DETAILED: LEFT LATERAL BREAST 4-5:00 REGION
LOCATION DETAILED: RIGHT CENTRAL PARIETAL SCALP
LOCATION DETAILED: RIGHT INFRAMAMMARY CREASE (INNER QUADRANT)

## 2019-05-28 ASSESSMENT — LOCATION ZONE DERM
LOCATION ZONE: SCALP
LOCATION ZONE: FEET
LOCATION ZONE: TOENAIL
LOCATION ZONE: TRUNK

## 2019-05-28 NOTE — HPI: RASH
What Type Of Note Output Would You Prefer (Optional)?: Bullet Format
How Severe Is Your Rash?: mild
Is This A New Presentation, Or A Follow-Up?: Rash
Additional History: Has Triamcinolone at home, but she needs to find it.

## 2019-05-28 NOTE — PROCEDURE: ADDITIONAL NOTES
Additional Notes: Discussed treatment options with Spironolactone, PRP and Men Rogaine. \\nPlan treating with Men’s Rogaine.
Detail Level: Zone
Additional Notes: Patient to grow toenails and clip, will send for Fungal Culture and PAS.
Additional Notes: Discussed elective cosmetic treatment with liquid nitrogen, quoted $150.

## 2019-05-28 NOTE — PROCEDURE: MEDICATION COUNSELING
Elidel Pregnancy And Lactation Text: This medication is Pregnancy Category C. It is unknown if this medication is excreted in breast milk.
SSKI Counseling:  I discussed with the patient the risks of SSKI including but not limited to thyroid abnormalities, metallic taste, GI upset, fever, headache, acne, arthralgias, paraesthesias, lymphadenopathy, easy bleeding, arrhythmias, and allergic reaction.
Azathioprine Pregnancy And Lactation Text: This medication is Pregnancy Category D and isn't considered safe during pregnancy. It is unknown if this medication is excreted in breast milk.
Siliq Pregnancy And Lactation Text: The risk during pregnancy and breastfeeding is uncertain with this medication.
Rhofade Counseling: Rhofade is a topical medication which can decrease superficial blood flow where applied. Side effects are uncommon and include stinging, redness and allergic reactions.
Bexarotene Pregnancy And Lactation Text: This medication is Pregnancy Category X and should not be given to women who are pregnant or may become pregnant. This medication should not be used if you are breast feeding.
Azathioprine Counseling:  I discussed with the patient the risks of azathioprine including but not limited to myelosuppression, immunosuppression, hepatotoxicity, lymphoma, and infections.  The patient understands that monitoring is required including baseline LFTs, Creatinine, possible TPMP genotyping and weekly CBCs for the first month and then every 2 weeks thereafter.  The patient verbalized understanding of the proper use and possible adverse effects of azathioprine.  All of the patient's questions and concerns were addressed.
Doxycycline Counseling:  Patient counseled regarding possible photosensitivity and increased risk for sunburn.  Patient instructed to avoid sunlight, if possible.  When exposed to sunlight, patients should wear protective clothing, sunglasses, and sunscreen.  The patient was instructed to call the office immediately if the following severe adverse effects occur:  hearing changes, easy bruising/bleeding, severe headache, or vision changes.  The patient verbalized understanding of the proper use and possible adverse effects of doxycycline.  All of the patient's questions and concerns were addressed.
Include Pregnancy/Lactation Warning?: No
Sski Pregnancy And Lactation Text: This medication is Pregnancy Category D and isn't considered safe during pregnancy. It is excreted in breast milk.
Doxycycline Pregnancy And Lactation Text: This medication is Pregnancy Category D and not consider safe during pregnancy. It is also excreted in breast milk but is considered safe for shorter treatment courses.
Rhofade Pregnancy And Lactation Text: This medication has not been assigned a Pregnancy Risk Category. It is unknown if the medication is excreted in breast milk.
Niacinamide Counseling: I recommended taking niacin or niacinamide, also know as vitamin B3, twice daily. Recent evidence suggests that taking vitamin B3 (500 mg twice daily) can reduce the risk of actinic keratoses and non-melanoma skin cancers. Side effects of vitamin B3 include flushing and headache.
Cimetidine Pregnancy And Lactation Text: This medication is Pregnancy Category B and is considered safe during pregnancy. It is also excreted in breast milk and breast feeding isn't recommended.
Eucrisa Counseling: Patient may experience a mild burning sensation during topical application. Eucrisa is not approved in children less than 2 years of age.
Isotretinoin Counseling: Patient should get monthly blood tests, not donate blood, not drive at night if vision affected, not share medication, and not undergo elective surgery for 6 months after tx completed. Side effects reviewed, pt to contact office should one occur.
Hydroxychloroquine Pregnancy And Lactation Text: This medication has been shown to cause fetal harm but it isn't assigned a Pregnancy Risk Category. There are small amounts excreted in breast milk.
Fluconazole Counseling:  Patient counseled regarding adverse effects of fluconazole including but not limited to headache, diarrhea, nausea, upset stomach, liver function test abnormalities, taste disturbance, and stomach pain.  There is a rare possibility of liver failure that can occur when taking fluconazole.  The patient understands that monitoring of LFTs and kidney function test may be required, especially at baseline. The patient verbalized understanding of the proper use and possible adverse effects of fluconazole.  All of the patient's questions and concerns were addressed.
Cimetidine Counseling:  I discussed with the patient the risks of Cimetidine including but not limited to gynecomastia, headache, diarrhea, nausea, drowsiness, arrhythmias, pancreatitis, skin rashes, psychosis, bone marrow suppression and kidney toxicity.
Cosentyx Counseling:  I discussed with the patient the risks of Cosentyx including but not limited to worsening of Crohn's disease, immunosuppression, allergic reactions and infections.  The patient understands that monitoring is required including a PPD at baseline and must alert us or the primary physician if symptoms of infection or other concerning signs are noted.
Solaraze Counseling:  I discussed with the patient the risks of Solaraze including but not limited to erythema, scaling, itching, weeping, crusting, and pain.
Eucrisa Pregnancy And Lactation Text: This medication has not been assigned a Pregnancy Risk Category but animal studies failed to show danger with the topical medication. It is unknown if the medication is excreted in breast milk.
Thalidomide Counseling: I discussed with the patient the risks of thalidomide including but not limited to birth defects, anxiety, weakness, chest pain, dizziness, cough and severe allergy.
Cosentyx Pregnancy And Lactation Text: This medication is Pregnancy Category B and is considered safe during pregnancy. It is unknown if this medication is excreted in breast milk.
Opioid Counseling: I discussed with the patient the potential side effects of opioids including but not limited to addiction, altered mental status, and depression. I stressed avoiding alcohol, benzodiazepines, muscle relaxants and sleep aids unless specifically okayed by a physician. The patient verbalized understanding of the proper use and possible adverse effects of opioids. All of the patient's questions and concerns were addressed. They were instructed to flush the remaining pills down the toilet if they did not need them for pain.
Isotretinoin Pregnancy And Lactation Text: This medication is Pregnancy Category X and is considered extremely dangerous during pregnancy. It is unknown if it is excreted in breast milk.
Cellcept Counseling:  I discussed with the patient the risks of mycophenolate mofetil including but not limited to infection/immunosuppression, GI upset, hypokalemia, hypercholesterolemia, bone marrow suppression, lymphoproliferative disorders, malignancy, GI ulceration/bleed/perforation, colitis, interstitial lung disease, kidney failure, progressive multifocal leukoencephalopathy, and birth defects.  The patient understands that monitoring is required including a baseline creatinine and regular CBC testing. In addition, patient must alert us immediately if symptoms of infection or other concerning signs are noted.
Erythromycin Counseling:  I discussed with the patient the risks of erythromycin including but not limited to GI upset, allergic reaction, drug rash, diarrhea, increase in liver enzymes, and yeast infections.
Simponi Counseling:  I discussed with the patient the risks of golimumab including but not limited to myelosuppression, immunosuppression, autoimmune hepatitis, demyelinating diseases, lymphoma, and serious infections.  The patient understands that monitoring is required including a PPD at baseline and must alert us or the primary physician if symptoms of infection or other concerning signs are noted.
Fluconazole Pregnancy And Lactation Text: This medication is Pregnancy Category C and it isn't know if it is safe during pregnancy. It is also excreted in breast milk.
Erythromycin Pregnancy And Lactation Text: This medication is Pregnancy Category B and is considered safe during pregnancy. It is also excreted in breast milk.
Thalidomide Pregnancy And Lactation Text: This medication is Pregnancy Category X and is absolutely contraindicated during pregnancy. It is unknown if it is excreted in breast milk.
Doxepin Pregnancy And Lactation Text: This medication is Pregnancy Category C and it isn't known if it is safe during pregnancy. It is also excreted in breast milk and breast feeding isn't recommended.
Opioid Pregnancy And Lactation Text: These medications can lead to premature delivery and should be avoided during pregnancy. These medications are also present in breast milk in small amounts.
High Dose Vitamin A Counseling: Side effects reviewed, pt to contact office should one occur.
Niacinamide Pregnancy And Lactation Text: These medications are considered safe during pregnancy.
Doxepin Counseling:  Patient advised that the medication is sedating and not to drive a car after taking this medication. Patient informed of potential adverse effects including but not limited to dry mouth, urinary retention, and blurry vision.  The patient verbalized understanding of the proper use and possible adverse effects of doxepin.  All of the patient's questions and concerns were addressed.
Dupixent Counseling: I discussed with the patient the risks of dupilumab including but not limited to eye infection and irritation, cold sores, injection site reactions, worsening of asthma, allergic reactions and increased risk of parasitic infection.  Live vaccines should be avoided while taking dupilumab. Dupilumab will also interact with certain medications such as warfarin and cyclosporine. The patient understands that monitoring is required and they must alert us or the primary physician if symptoms of infection or other concerning signs are noted.
Griseofulvin Counseling:  I discussed with the patient the risks of griseofulvin including but not limited to photosensitivity, cytopenia, liver damage, nausea/vomiting and severe allergy.  The patient understands that this medication is best absorbed when taken with a fatty meal (e.g., ice cream or french fries).
Griseofulvin Pregnancy And Lactation Text: This medication is Pregnancy Category X and is known to cause serious birth defects. It is unknown if this medication is excreted in breast milk but breast feeding should be avoided.
Topical Retinoid counseling:  Patient advised to apply a pea-sized amount only at bedtime and wait 30 minutes after washing their face before applying.  If too drying, patient may add a non-comedogenic moisturizer. The patient verbalized understanding of the proper use and possible adverse effects of retinoids.  All of the patient's questions and concerns were addressed.
Cyclophosphamide Pregnancy And Lactation Text: This medication is Pregnancy Category D and it isn't considered safe during pregnancy. This medication is excreted in breast milk.
High Dose Vitamin A Pregnancy And Lactation Text: High dose vitamin A therapy is contraindicated during pregnancy and breast feeding.
Valtrex Counseling: I discussed with the patient the risks of valacyclovir including but not limited to kidney damage, nausea, vomiting and severe allergy.  The patient understands that if the infection seems to be worsening or is not improving, they are to call.
Metronidazole Counseling:  I discussed with the patient the risks of metronidazole including but not limited to seizures, nausea/vomiting, a metallic taste in the mouth, nausea/vomiting and severe allergy.
Dupixent Pregnancy And Lactation Text: This medication likely crosses the placenta but the risk for the fetus is uncertain. This medication is excreted in breast milk.
Nsaids Counseling: NSAID Counseling: I discussed with the patient that NSAIDs should be taken with food. Prolonged use of NSAIDs can result in the development of stomach ulcers.  Patient advised to stop taking NSAIDs if abdominal pain occurs.  The patient verbalized understanding of the proper use and possible adverse effects of NSAIDs.  All of the patient's questions and concerns were addressed.
Solaraze Pregnancy And Lactation Text: This medication is Pregnancy Category B and is considered safe. There is some data to suggest avoiding during the third trimester. It is unknown if this medication is excreted in breast milk.
Hydroquinone Counseling:  Patient advised that medication may result in skin irritation, lightening (hypopigmentation), dryness, and burning.  In the event of skin irritation, the patient was advised to reduce the amount of the drug applied or use it less frequently.  Rarely, spots that are treated with hydroquinone can become darker (pseudoochronosis).  Should this occur, patient instructed to stop medication and call the office. The patient verbalized understanding of the proper use and possible adverse effects of hydroquinone.  All of the patient's questions and concerns were addressed.
Cyclophosphamide Counseling:  I discussed with the patient the risks of cyclophosphamide including but not limited to hair loss, hormonal abnormalities, decreased fertility, abdominal pain, diarrhea, nausea and vomiting, bone marrow suppression and infection. The patient understands that monitoring is required while taking this medication.
Stelara Counseling:  I discussed with the patient the risks of ustekinumab including but not limited to immunosuppression, malignancy, posterior leukoencephalopathy syndrome, and serious infections.  The patient understands that monitoring is required including a PPD at baseline and must alert us or the primary physician if symptoms of infection or other concerning signs are noted.
Valtrex Pregnancy And Lactation Text: this medication is Pregnancy Category B and is considered safe during pregnancy. This medication is not directly found in breast milk but it's metabolite acyclovir is present.
Arava Counseling:  Patient counseled regarding adverse effects of Arava including but not limited to nausea, vomiting, abnormalities in liver function tests. Patients may develop mouth sores, rash, diarrhea, and abnormalities in blood counts. The patient understands that monitoring is required including LFTs and blood counts.  There is a rare possibility of scarring of the liver and lung problems that can occur when taking methotrexate. Persistent nausea, loss of appetite, pale stools, dark urine, cough, and shortness of breath should be reported immediately. Patient advised to discontinue Arava treatment and consult with a physician prior to attempting conception. The patient will have to undergo a treatment to eliminate Arava from the body prior to conception.
Hydroxyzine Pregnancy And Lactation Text: This medication is not safe during pregnancy and should not be taken. It is also excreted in breast milk and breast feeding isn't recommended.
Nsaids Pregnancy And Lactation Text: These medications are considered safe up to 30 weeks gestation. It is excreted in breast milk.
Hydroxyzine Counseling: Patient advised that the medication is sedating and not to drive a car after taking this medication.  Patient informed of potential adverse effects including but not limited to dry mouth, urinary retention, and blurry vision.  The patient verbalized understanding of the proper use and possible adverse effects of hydroxyzine.  All of the patient's questions and concerns were addressed.
Enbrel Counseling:  I discussed with the patient the risks of etanercept including but not limited to myelosuppression, immunosuppression, autoimmune hepatitis, demyelinating diseases, lymphoma, and infections.  The patient understands that monitoring is required including a PPD at baseline and must alert us or the primary physician if symptoms of infection or other concerning signs are noted.
Tazorac Counseling:  Patient advised that medication is irritating and drying.  Patient may need to apply sparingly and wash off after an hour before eventually leaving it on overnight.  The patient verbalized understanding of the proper use and possible adverse effects of tazorac.  All of the patient's questions and concerns were addressed.
Taltz Counseling: I discussed with the patient the risks of ixekizumab including but not limited to immunosuppression, serious infections, worsening of inflammatory bowel disease and drug reactions.  The patient understands that monitoring is required including a PPD at baseline and must alert us or the primary physician if symptoms of infection or other concerning signs are noted.
Metronidazole Pregnancy And Lactation Text: This medication is Pregnancy Category B and considered safe during pregnancy.  It is also excreted in breast milk.
Imiquimod Counseling:  I discussed with the patient the risks of imiquimod including but not limited to erythema, scaling, itching, weeping, crusting, and pain.  Patient understands that the inflammatory response to imiquimod is variable from person to person and was educated regarded proper titration schedule.  If flu-like symptoms develop, patient knows to discontinue the medication and contact us.
Odomzo Counseling- I discussed with the patient the risks of Odomzo including but not limited to nausea, vomiting, diarrhea, constipation, weight loss, changes in the sense of taste, decreased appetite, muscle spasms, and hair loss.  The patient verbalized understanding of the proper use and possible adverse effects of Odomzo.  All of the patient's questions and concerns were addressed.
Cyclosporine Counseling:  I discussed with the patient the risks of cyclosporine including but not limited to hypertension, gingival hyperplasia,myelosuppression, immunosuppression, liver damage, kidney damage, neurotoxicity, lymphoma, and serious infections. The patient understands that monitoring is required including baseline blood pressure, CBC, CMP, lipid panel and uric acid, and then 1-2 times monthly CMP and blood pressure.
Albendazole Counseling:  I discussed with the patient the risks of albendazole including but not limited to cytopenia, kidney damage, nausea/vomiting and severe allergy.  The patient understands that this medication is being used in an off-label manner.
Clofazimine Counseling:  I discussed with the patient the risks of clofazimine including but not limited to skin and eye pigmentation, liver damage, nausea/vomiting, gastrointestinal bleeding and allergy.
Minocycline Counseling: Patient advised regarding possible photosensitivity and discoloration of the teeth, skin, lips, tongue and gums.  Patient instructed to avoid sunlight, if possible.  When exposed to sunlight, patients should wear protective clothing, sunglasses, and sunscreen.  The patient was instructed to call the office immediately if the following severe adverse effects occur:  hearing changes, easy bruising/bleeding, severe headache, or vision changes.  The patient verbalized understanding of the proper use and possible adverse effects of minocycline.  All of the patient's questions and concerns were addressed.
Benzoyl Peroxide Counseling: Patient counseled that medicine may cause skin irritation and bleach clothing.  In the event of skin irritation, the patient was advised to reduce the amount of the drug applied or use it less frequently.   The patient verbalized understanding of the proper use and possible adverse effects of benzoyl peroxide.  All of the patient's questions and concerns were addressed.
Cyclosporine Pregnancy And Lactation Text: This medication is Pregnancy Category C and it isn't know if it is safe during pregnancy. This medication is excreted in breast milk.
Humira Counseling:  I discussed with the patient the risks of adalimumab including but not limited to myelosuppression, immunosuppression, autoimmune hepatitis, demyelinating diseases, lymphoma, and serious infections.  The patient understands that monitoring is required including a PPD at baseline and must alert us or the primary physician if symptoms of infection or other concerning signs are noted.
Azithromycin Counseling:  I discussed with the patient the risks of azithromycin including but not limited to GI upset, allergic reaction, drug rash, diarrhea, and yeast infections.
Azithromycin Pregnancy And Lactation Text: This medication is considered safe during pregnancy and is also secreted in breast milk.
Otezla Counseling: The side effects of Otezla were discussed with the patient, including but not limited to worsening or new depression, weight loss, diarrhea, nausea, upper respiratory tract infection, and headache. Patient instructed to call the office should any adverse effect occur.  The patient verbalized understanding of the proper use and possible adverse effects of Otezla.  All the patient's questions and concerns were addressed.
Tremfya Counseling: I discussed with the patient the risks of guselkumab including but not limited to immunosuppression, serious infections, worsening of inflammatory bowel disease and drug reactions.  The patient understands that monitoring is required including a PPD at baseline and must alert us or the primary physician if symptoms of infection or other concerning signs are noted.
Clofazimine Pregnancy And Lactation Text: This medication is Pregnancy Category C and isn't considered safe during pregnancy. It is excreted in breast milk.
Minocycline Pregnancy And Lactation Text: This medication is Pregnancy Category D and not consider safe during pregnancy. It is also excreted in breast milk.
Tazorac Pregnancy And Lactation Text: This medication is not safe during pregnancy. It is unknown if this medication is excreted in breast milk.
Minoxidil Counseling: Minoxidil is a topical medication which can increase blood flow where it is applied. It is uncertain how this medication increases hair growth. Side effects are uncommon and include stinging and allergic reactions.
Benzoyl Peroxide Pregnancy And Lactation Text: This medication is Pregnancy Category C. It is unknown if benzoyl peroxide is excreted in breast milk.
Methotrexate Counseling:  Patient counseled regarding adverse effects of methotrexate including but not limited to nausea, vomiting, abnormalities in liver function tests. Patients may develop mouth sores, rash, diarrhea, and abnormalities in blood counts. The patient understands that monitoring is required including LFT's and blood counts.  There is a rare possibility of scarring of the liver and lung problems that can occur when taking methotrexate. Persistent nausea, loss of appetite, pale stools, dark urine, cough, and shortness of breath should be reported immediately. Patient advised to discontinue methotrexate treatment at least three months before attempting to become pregnant.  I discussed the need for folate supplements while taking methotrexate.  These supplements can decrease side effects during methotrexate treatment. The patient verbalized understanding of the proper use and possible adverse effects of methotrexate.  All of the patient's questions and concerns were addressed.
Itraconazole Counseling:  I discussed with the patient the risks of itraconazole including but not limited to liver damage, nausea/vomiting, neuropathy, and severe allergy.  The patient understands that this medication is best absorbed when taken with acidic beverages such as non-diet cola or ginger ale.  The patient understands that monitoring is required including baseline LFTs and repeat LFTs at intervals.  The patient understands that they are to contact us or the primary physician if concerning signs are noted.
Quinolones Counseling:  I discussed with the patient the risks of fluoroquinolones including but not limited to GI upset, allergic reaction, drug rash, diarrhea, dizziness, photosensitivity, yeast infections, liver function test abnormalities, tendonitis/tendon rupture.
Colchicine Counseling:  Patient counseled regarding adverse effects including but not limited to stomach upset (nausea, vomiting, stomach pain, or diarrhea).  Patient instructed to limit alcohol consumption while taking this medication.  Colchicine may reduce blood counts especially with prolonged use.  The patient understands that monitoring of kidney function and blood counts may be required, especially at baseline. The patient verbalized understanding of the proper use and possible adverse effects of colchicine.  All of the patient's questions and concerns were addressed.
Albendazole Pregnancy And Lactation Text: This medication is Pregnancy Category C and it isn't known if it is safe during pregnancy. It is also excreted in breast milk.
Topical Clindamycin Counseling: Patient counseled that this medication may cause skin irritation or allergic reactions.  In the event of skin irritation, the patient was advised to reduce the amount of the drug applied or use it less frequently.   The patient verbalized understanding of the proper use and possible adverse effects of clindamycin.  All of the patient's questions and concerns were addressed.
Carac Counseling:  I discussed with the patient the risks of Carac including but not limited to erythema, scaling, itching, weeping, crusting, and pain.
Bactrim Counseling:  I discussed with the patient the risks of sulfa antibiotics including but not limited to GI upset, allergic reaction, drug rash, diarrhea, dizziness, photosensitivity, and yeast infections.  Rarely, more serious reactions can occur including but not limited to aplastic anemia, agranulocytosis, methemoglobinemia, blood dyscrasias, liver or kidney failure, lung infiltrates or desquamative/blistering drug rashes.
Methotrexate Pregnancy And Lactation Text: This medication is Pregnancy Category X and is known to cause fetal harm. This medication is excreted in breast milk.
Ilumya Counseling: I discussed with the patient the risks of tildrakizumab including but not limited to immunosuppression, malignancy, posterior leukoencephalopathy syndrome, and serious infections.  The patient understands that monitoring is required including a PPD at baseline and must alert us or the primary physician if symptoms of infection or other concerning signs are noted.
Otezla Pregnancy And Lactation Text: This medication is Pregnancy Category C and it isn't known if it is safe during pregnancy. It is unknown if it is excreted in breast milk.
Bactrim Pregnancy And Lactation Text: This medication is Pregnancy Category D and is known to cause fetal risk.  It is also excreted in breast milk.
Oxybutynin Counseling:  I discussed with the patient the risks of oxybutynin including but not limited to skin rash, drowsiness, dry mouth, difficulty urinating, and blurred vision.
Xeljanz Counseling: I discussed with the patient the risks of Xeljanz therapy including increased risk of infection, liver issues, headache, diarrhea, or cold symptoms. Live vaccines should be avoided. They were instructed to call if they have any problems.
Prednisone Counseling:  I discussed with the patient the risks of prolonged use of prednisone including but not limited to weight gain, insomnia, osteoporosis, mood changes, diabetes, susceptibility to infection, glaucoma and high blood pressure.  In cases where prednisone use is prolonged, patients should be monitored with blood pressure checks, serum glucose levels and an eye exam.  Additionally, the patient may need to be placed on GI prophylaxis, PCP prophylaxis, and calcium and vitamin D supplementation and/or a bisphosphonate.  The patient verbalized understanding of the proper use and the possible adverse effects of prednisone.  All of the patient's questions and concerns were addressed.
Carac Pregnancy And Lactation Text: This medication is Pregnancy Category X and contraindicated in pregnancy and in women who may become pregnant. It is unknown if this medication is excreted in breast milk.
Ivermectin Counseling:  Patient instructed to take medication on an empty stomach with a full glass of water.  Patient informed of potential adverse effects including but not limited to nausea, diarrhea, dizziness, itching, and swelling of the extremities or lymph nodes.  The patient verbalized understanding of the proper use and possible adverse effects of ivermectin.  All of the patient's questions and concerns were addressed.
Topical Clindamycin Pregnancy And Lactation Text: This medication is Pregnancy Category B and is considered safe during pregnancy. It is unknown if it is excreted in breast milk.
Mirvaso Counseling: Mirvaso is a topical medication which can decrease superficial blood flow where applied. Side effects are uncommon and include stinging, redness and allergic reactions.
Rifampin Counseling: I discussed with the patient the risks of rifampin including but not limited to liver damage, kidney damage, red-orange body fluids, nausea/vomiting and severe allergy.
Ketoconazole Counseling:   Patient counseled regarding improving absorption with orange juice.  Adverse effects include but are not limited to breast enlargement, headache, diarrhea, nausea, upset stomach, liver function test abnormalities, taste disturbance, and stomach pain.  There is a rare possibility of liver failure that can occur when taking ketoconazole. The patient understands that monitoring of LFTs may be required, especially at baseline. The patient verbalized understanding of the proper use and possible adverse effects of ketoconazole.  All of the patient's questions and concerns were addressed.
Xelubaldoz Pregnancy And Lactation Text: This medication is Pregnancy Category D and is not considered safe during pregnancy.  The risk during breast feeding is also uncertain.
Cephalexin Counseling: I counseled the patient regarding use of cephalexin as an antibiotic for prophylactic and/or therapeutic purposes. Cephalexin (commonly prescribed under brand name Keflex) is a cephalosporin antibiotic which is active against numerous classes of bacteria, including most skin bacteria. Side effects may include nausea, diarrhea, gastrointestinal upset, rash, hives, yeast infections, and in rare cases, hepatitis, kidney disease, seizures, fever, confusion, neurologic symptoms, and others. Patients with severe allergies to penicillin medications are cautioned that there is about a 10% incidence of cross-reactivity with cephalosporins. When possible, patients with penicillin allergies should use alternatives to cephalosporins for antibiotic therapy.
Infliximab Counseling:  I discussed with the patient the risks of infliximab including but not limited to myelosuppression, immunosuppression, autoimmune hepatitis, demyelinating diseases, lymphoma, and serious infections.  The patient understands that monitoring is required including a PPD at baseline and must alert us or the primary physician if symptoms of infection or other concerning signs are noted.
Dapsone Counseling: I discussed with the patient the risks of dapsone including but not limited to hemolytic anemia, agranulocytosis, rashes, methemoglobinemia, kidney failure, peripheral neuropathy, headaches, GI upset, and liver toxicity.  Patients who start dapsone require monitoring including baseline LFTs and weekly CBCs for the first month, then every month thereafter.  The patient verbalized understanding of the proper use and possible adverse effects of dapsone.  All of the patient's questions and concerns were addressed.
Gabapentin Counseling: I discussed with the patient the risks of gabapentin including but not limited to dizziness, somnolence, fatigue and ataxia.
Topical Sulfur Applications Counseling: Topical Sulfur Counseling: Patient counseled that this medication may cause skin irritation or allergic reactions.  In the event of skin irritation, the patient was advised to reduce the amount of the drug applied or use it less frequently.   The patient verbalized understanding of the proper use and possible adverse effects of topical sulfur application.  All of the patient's questions and concerns were addressed.
5-Fu Counseling: 5-Fluorouracil Counseling:  I discussed with the patient the risks of 5-fluorouracil including but not limited to erythema, scaling, itching, weeping, crusting, and pain.
Xolair Counseling:  Patient informed of potential adverse effects including but not limited to fever, muscle aches, rash and allergic reactions.  The patient verbalized understanding of the proper use and possible adverse effects of Xolair.  All of the patient's questions and concerns were addressed.
Birth Control Pills Counseling: Birth Control Pill Counseling: I discussed with the patient the potential side effects of OCPs including but not limited to increased risk of stroke, heart attack, thrombophlebitis, deep venous thrombosis, hepatic adenomas, breast changes, GI upset, headaches, and depression.  The patient verbalized understanding of the proper use and possible adverse effects of OCPs. All of the patient's questions and concerns were addressed.
Dapsone Pregnancy And Lactation Text: This medication is Pregnancy Category C and is not considered safe during pregnancy or breast feeding.
Rifampin Pregnancy And Lactation Text: This medication is Pregnancy Category C and it isn't know if it is safe during pregnancy. It is also excreted in breast milk and should not be used if you are breast feeding.
Ketoconazole Pregnancy And Lactation Text: This medication is Pregnancy Category C and it isn't know if it is safe during pregnancy. It is also excreted in breast milk and breast feeding isn't recommended.
Picato Counseling:  I discussed with the patient the risks of Picato including but not limited to erythema, scaling, itching, weeping, crusting, and pain.
Topical Sulfur Applications Pregnancy And Lactation Text: This medication is Pregnancy Category C and has an unknown safety profile during pregnancy. It is unknown if this topical medication is excreted in breast milk.
Erivedge Counseling- I discussed with the patient the risks of Erivedge including but not limited to nausea, vomiting, diarrhea, constipation, weight loss, changes in the sense of taste, decreased appetite, muscle spasms, and hair loss.  The patient verbalized understanding of the proper use and possible adverse effects of Erivedge.  All of the patient's questions and concerns were addressed.
Drysol Counseling:  I discussed with the patient the risks of drysol/aluminum chloride including but not limited to skin rash, itching, irritation, burning.
Glycopyrrolate Counseling:  I discussed with the patient the risks of glycopyrrolate including but not limited to skin rash, drowsiness, dry mouth, difficulty urinating, and blurred vision.
Rituxan Counseling:  I discussed with the patient the risks of Rituxan infusions. Side effects can include infusion reactions, severe drug rashes including mucocutaneous reactions, reactivation of latent hepatitis and other infections and rarely progressive multifocal leukoencephalopathy.  All of the patient's questions and concerns were addressed.
Tetracycline Counseling: Patient counseled regarding possible photosensitivity and increased risk for sunburn.  Patient instructed to avoid sunlight, if possible.  When exposed to sunlight, patients should wear protective clothing, sunglasses, and sunscreen.  The patient was instructed to call the office immediately if the following severe adverse effects occur:  hearing changes, easy bruising/bleeding, severe headache, or vision changes.  The patient verbalized understanding of the proper use and possible adverse effects of tetracycline.  All of the patient's questions and concerns were addressed. Patient understands to avoid pregnancy while on therapy due to potential birth defects.
Cephalexin Pregnancy And Lactation Text: This medication is Pregnancy Category B and considered safe during pregnancy.  It is also excreted in breast milk but can be used safely for shorter doses.
Wartpeel Counseling:  I discussed with the patient the risks of Wartpeel including but not limited to erythema, scaling, itching, weeping, crusting, and pain.
Birth Control Pills Pregnancy And Lactation Text: This medication should be avoided if pregnant and for the first 30 days post-partum.
Acitretin Counseling:  I discussed with the patient the risks of acitretin including but not limited to hair loss, dry lips/skin/eyes, liver damage, hyperlipidemia, depression/suicidal ideation, photosensitivity.  Serious rare side effects can include but are not limited to pancreatitis, pseudotumor cerebri, bony changes, clot formation/stroke/heart attack.  Patient understands that alcohol is contraindicated since it can result in liver toxicity and significantly prolong the elimination of the drug by many years.
Spironolactone Counseling: Patient advised regarding risks of diarrhea, abdominal pain, hyperkalemia, birth defects (for female patients), liver toxicity and renal toxicity. The patient may need blood work to monitor liver and kidney function and potassium levels while on therapy. The patient verbalized understanding of the proper use and possible adverse effects of spironolactone.  All of the patient's questions and concerns were addressed.
Drysol Pregnancy And Lactation Text: This medication is considered safe during pregnancy and breast feeding.
Rituxan Pregnancy And Lactation Text: This medication is Pregnancy Category C and it isn't know if it is safe during pregnancy. It is unknown if this medication is excreted in breast milk but similar antibodies are known to be excreted.
Cimzia Counseling:  I discussed with the patient the risks of Cimzia including but not limited to immunosuppression, allergic reactions and infections.  The patient understands that monitoring is required including a PPD at baseline and must alert us or the primary physician if symptoms of infection or other concerning signs are noted.
Protopic Counseling: Patient may experience a mild burning sensation during topical application. Protopic is not approved in children less than 2 years of age. There have been case reports of hematologic and skin malignancies in patients using topical calcineurin inhibitors although causality is questionable.
Terbinafine Counseling: Patient counseling regarding adverse effects of terbinafine including but not limited to headache, diarrhea, rash, upset stomach, liver function test abnormalities, itching, taste/smell disturbance, nausea, abdominal pain, and flatulence.  There is a rare possibility of liver failure that can occur when taking terbinafine.  The patient understands that a baseline LFT and kidney function test may be required. The patient verbalized understanding of the proper use and possible adverse effects of terbinafine.  All of the patient's questions and concerns were addressed.
Acitretin Pregnancy And Lactation Text: This medication is Pregnancy Category X and should not be given to women who are pregnant or may become pregnant in the future. This medication is excreted in breast milk.
Xolair Pregnancy And Lactation Text: This medication is Pregnancy Category B and is considered safe during pregnancy. This medication is excreted in breast milk.
Clindamycin Counseling: I counseled the patient regarding use of clindamycin as an antibiotic for prophylactic and/or therapeutic purposes. Clindamycin is active against numerous classes of bacteria, including skin bacteria. Side effects may include nausea, diarrhea, gastrointestinal upset, rash, hives, yeast infections, and in rare cases, colitis.
Protopic Pregnancy And Lactation Text: This medication is Pregnancy Category C. It is unknown if this medication is excreted in breast milk when applied topically.
Hydroxychloroquine Counseling:  I discussed with the patient that a baseline ophthalmologic exam is needed at the start of therapy and every year thereafter while on therapy. A CBC may also be warranted for monitoring.  The side effects of this medication were discussed with the patient, including but not limited to agranulocytosis, aplastic anemia, seizures, rashes, retinopathy, and liver toxicity. Patient instructed to call the office should any adverse effect occur.  The patient verbalized understanding of the proper use and possible adverse effects of Plaquenil.  All the patient's questions and concerns were addressed.
Elidel Counseling: Patient may experience a mild burning sensation during topical application. Elidel is not approved in children less than 2 years of age. There have been case reports of hematologic and skin malignancies in patients using topical calcineurin inhibitors although causality is questionable.
Cimzia Pregnancy And Lactation Text: This medication crosses the placenta but can be considered safe in certain situations. Cimzia may be excreted in breast milk.
Siliq Counseling:  I discussed with the patient the risks of Siliq including but not limited to new or worsening depression, suicidal thoughts and behavior, immunosuppression, malignancy, posterior leukoencephalopathy syndrome, and serious infections.  The patient understands that monitoring is required including a PPD at baseline and must alert us or the primary physician if symptoms of infection or other concerning signs are noted. There is also a special program designed to monitor depression which is required with Siliq.
Detail Level: Zone
Zyclara Counseling:  I discussed with the patient the risks of imiquimod including but not limited to erythema, scaling, itching, weeping, crusting, and pain.  Patient understands that the inflammatory response to imiquimod is variable from person to person and was educated regarded proper titration schedule.  If flu-like symptoms develop, patient knows to discontinue the medication and contact us.
Clindamycin Pregnancy And Lactation Text: This medication can be used in pregnancy if certain situations. Clindamycin is also present in breast milk.
Glycopyrrolate Pregnancy And Lactation Text: This medication is Pregnancy Category B and is considered safe during pregnancy. It is unknown if it is excreted breast milk.
Spironolactone Pregnancy And Lactation Text: This medication can cause feminization of the male fetus and should be avoided during pregnancy. The active metabolite is also found in breast milk.
Bexarotene Counseling:  I discussed with the patient the risks of bexarotene including but not limited to hair loss, dry lips/skin/eyes, liver abnormalities, hyperlipidemia, pancreatitis, depression/suicidal ideation, photosensitivity, drug rash/allergic reactions, hypothyroidism, anemia, leukopenia, infection, cataracts, and teratogenicity.  Patient understands that they will need regular blood tests to check lipid profile, liver function tests, white blood cell count, thyroid function tests and pregnancy test if applicable.

## 2019-07-12 ENCOUNTER — APPOINTMENT (RX ONLY)
Dept: URBAN - METROPOLITAN AREA CLINIC 35 | Facility: CLINIC | Age: 59
Setting detail: DERMATOLOGY
End: 2019-07-12

## 2019-07-23 ENCOUNTER — RX ONLY (OUTPATIENT)
Age: 59
Setting detail: RX ONLY
End: 2019-07-23

## 2019-07-23 RX ORDER — TERBINAFINE HYDROCHLORIDE 250 MG/1
TABLET ORAL
Qty: 7 | Refills: 0 | Status: ERX

## 2019-07-29 ENCOUNTER — APPOINTMENT (RX ONLY)
Dept: URBAN - METROPOLITAN AREA CLINIC 35 | Facility: CLINIC | Age: 59
Setting detail: DERMATOLOGY
End: 2019-07-29

## 2019-07-29 DIAGNOSIS — D18.0 HEMANGIOMA: ICD-10-CM

## 2019-07-29 DIAGNOSIS — Z79.899 OTHER LONG TERM (CURRENT) DRUG THERAPY: ICD-10-CM

## 2019-07-29 DIAGNOSIS — B35.1 TINEA UNGUIUM: ICD-10-CM

## 2019-07-29 DIAGNOSIS — D22 MELANOCYTIC NEVI: ICD-10-CM

## 2019-07-29 DIAGNOSIS — L65.9 NONSCARRING HAIR LOSS, UNSPECIFIED: ICD-10-CM

## 2019-07-29 DIAGNOSIS — L82.1 OTHER SEBORRHEIC KERATOSIS: ICD-10-CM

## 2019-07-29 PROBLEM — D22.39 MELANOCYTIC NEVI OF OTHER PARTS OF FACE: Status: ACTIVE | Noted: 2019-07-29

## 2019-07-29 PROBLEM — D18.01 HEMANGIOMA OF SKIN AND SUBCUTANEOUS TISSUE: Status: ACTIVE | Noted: 2019-07-29

## 2019-07-29 PROCEDURE — ? COUNSELING

## 2019-07-29 PROCEDURE — ? OBSERVATION AND MEASURE

## 2019-07-29 PROCEDURE — ? LAB REPORTS REVIEWED

## 2019-07-29 PROCEDURE — 99214 OFFICE O/P EST MOD 30 MIN: CPT

## 2019-07-29 PROCEDURE — ? HIGH RISK MEDICATION MONITORING

## 2019-07-29 ASSESSMENT — LOCATION DETAILED DESCRIPTION DERM
LOCATION DETAILED: LEFT SUPERIOR CENTRAL MALAR CHEEK
LOCATION DETAILED: LEFT GREAT TOENAIL
LOCATION DETAILED: LEFT LATERAL FRONTAL SCALP
LOCATION DETAILED: LEFT PROXIMAL CALF
LOCATION DETAILED: RIGHT PROXIMAL CALF
LOCATION DETAILED: LEFT PROXIMAL PRETIBIAL REGION
LOCATION DETAILED: RIGHT CENTRAL FRONTAL SCALP
LOCATION DETAILED: RIGHT PROXIMAL PRETIBIAL REGION

## 2019-07-29 ASSESSMENT — LOCATION SIMPLE DESCRIPTION DERM
LOCATION SIMPLE: LEFT CHEEK
LOCATION SIMPLE: RIGHT SCALP
LOCATION SIMPLE: LEFT CALF
LOCATION SIMPLE: RIGHT CALF
LOCATION SIMPLE: LEFT PRETIBIAL REGION
LOCATION SIMPLE: RIGHT PRETIBIAL REGION
LOCATION SIMPLE: LEFT GREAT TOE
LOCATION SIMPLE: LEFT SCALP

## 2019-07-29 ASSESSMENT — LOCATION ZONE DERM
LOCATION ZONE: TOENAIL
LOCATION ZONE: LEG
LOCATION ZONE: SCALP
LOCATION ZONE: FACE

## 2019-07-29 NOTE — PROCEDURE: HIGH RISK MEDICATION MONITORING
Siliq Counseling:  I discussed with the patient the risks of Siliq including but not limited to new or worsening depression, suicidal thoughts and behavior, immunosuppression, malignancy, posterior leukoencephalopathy syndrome, and serious infections.  The patient understands that monitoring is required including a PPD at baseline and must alert us or the primary physician if symptoms of infection or other concerning signs are noted. There is also a special program designed to monitor depression which is required with Siliq.
Tetracycline Counseling: Patient counseled regarding possible photosensitivity and increased risk for sunburn.  Patient instructed to avoid sunlight, if possible.  When exposed to sunlight, patients should wear protective clothing, sunglasses, and sunscreen.  The patient was instructed to call the office immediately if the following severe adverse effects occur:  hearing changes, easy bruising/bleeding, severe headache, or vision changes.  The patient verbalized understanding of the proper use and possible adverse effects of tetracycline.  All of the patient's questions and concerns were addressed. Patient understands to avoid pregnancy while on therapy due to potential birth defects.
Cephalexin Pregnancy And Lactation Text: This medication is Pregnancy Category B and considered safe during pregnancy.  It is also excreted in breast milk but can be used safely for shorter doses.
Drysol Counseling:  I discussed with the patient the risks of drysol/aluminum chloride including but not limited to skin rash, itching, irritation, burning.
Solaraze Pregnancy And Lactation Text: This medication is Pregnancy Category B and is considered safe. There is some data to suggest avoiding during the third trimester. It is unknown if this medication is excreted in breast milk.
Cellcept Counseling:  I discussed with the patient the risks of mycophenolate mofetil including but not limited to infection/immunosuppression, GI upset, hypokalemia, hypercholesterolemia, bone marrow suppression, lymphoproliferative disorders, malignancy, GI ulceration/bleed/perforation, colitis, interstitial lung disease, kidney failure, progressive multifocal leukoencephalopathy, and birth defects.  The patient understands that monitoring is required including a baseline creatinine and regular CBC testing. In addition, patient must alert us immediately if symptoms of infection or other concerning signs are noted.
Zyclara Counseling:  I discussed with the patient the risks of imiquimod including but not limited to erythema, scaling, itching, weeping, crusting, and pain.  Patient understands that the inflammatory response to imiquimod is variable from person to person and was educated regarded proper titration schedule.  If flu-like symptoms develop, patient knows to discontinue the medication and contact us.
Cimetidine Counseling:  I discussed with the patient the risks of Cimetidine including but not limited to gynecomastia, headache, diarrhea, nausea, drowsiness, arrhythmias, pancreatitis, skin rashes, psychosis, bone marrow suppression and kidney toxicity.
Hydroxychloroquine Counseling:  I discussed with the patient that a baseline ophthalmologic exam is needed at the start of therapy and every year thereafter while on therapy. A CBC may also be warranted for monitoring.  The side effects of this medication were discussed with the patient, including but not limited to agranulocytosis, aplastic anemia, seizures, rashes, retinopathy, and liver toxicity. Patient instructed to call the office should any adverse effect occur.  The patient verbalized understanding of the proper use and possible adverse effects of Plaquenil.  All the patient's questions and concerns were addressed.
Prednisone Pregnancy And Lactation Text: This medication is Pregnancy Category C and it isn't know if it is safe during pregnancy. This medication is excreted in breast milk.
Prednisone Counseling:  I discussed with the patient the risks of prolonged use of prednisone including but not limited to weight gain, insomnia, osteoporosis, mood changes, diabetes, susceptibility to infection, glaucoma and high blood pressure.  In cases where prednisone use is prolonged, patients should be monitored with blood pressure checks, serum glucose levels and an eye exam.  Additionally, the patient may need to be placed on GI prophylaxis, PCP prophylaxis, and calcium and vitamin D supplementation and/or a bisphosphonate.  The patient verbalized understanding of the proper use and the possible adverse effects of prednisone.  All of the patient's questions and concerns were addressed.
Oxybutynin Counseling:  I discussed with the patient the risks of oxybutynin including but not limited to skin rash, drowsiness, dry mouth, difficulty urinating, and blurred vision.
Colchicine Pregnancy And Lactation Text: This medication is Pregnancy Category C and isn't considered safe during pregnancy. It is excreted in breast milk.
Tremfya Counseling: I discussed with the patient the risks of guselkumab including but not limited to immunosuppression, serious infections, worsening of inflammatory bowel disease and drug reactions.  The patient understands that monitoring is required including a PPD at baseline and must alert us or the primary physician if symptoms of infection or other concerning signs are noted.
Griseofulvin Counseling:  I discussed with the patient the risks of griseofulvin including but not limited to photosensitivity, cytopenia, liver damage, nausea/vomiting and severe allergy.  The patient understands that this medication is best absorbed when taken with a fatty meal (e.g., ice cream or french fries).
High Dose Vitamin A Counseling: Side effects reviewed, pt to contact office should one occur.
Thalidomide Pregnancy And Lactation Text: This medication is Pregnancy Category X and is absolutely contraindicated during pregnancy. It is unknown if it is excreted in breast milk.
Albendazole Pregnancy And Lactation Text: This medication is Pregnancy Category C and it isn't known if it is safe during pregnancy. It is also excreted in breast milk.
Enbrel Pregnancy And Lactation Text: This medication is Pregnancy Category B and is considered safe during pregnancy. It is unknown if this medication is excreted in breast milk.
Hydroxychloroquine Pregnancy And Lactation Text: This medication has been shown to cause fetal harm but it isn't assigned a Pregnancy Risk Category. There are small amounts excreted in breast milk.
Drysol Pregnancy And Lactation Text: This medication is considered safe during pregnancy and breast feeding.
Cimetidine Pregnancy And Lactation Text: This medication is Pregnancy Category B and is considered safe during pregnancy. It is also excreted in breast milk and breast feeding isn't recommended.
Tetracycline Pregnancy And Lactation Text: This medication is Pregnancy Category D and not consider safe during pregnancy. It is also excreted in breast milk.
Zyclara Pregnancy And Lactation Text: This medication is Pregnancy Category C. It is unknown if this medication is excreted in breast milk.
Clindamycin Counseling: I counseled the patient regarding use of clindamycin as an antibiotic for prophylactic and/or therapeutic purposes. Clindamycin is active against numerous classes of bacteria, including skin bacteria. Side effects may include nausea, diarrhea, gastrointestinal upset, rash, hives, yeast infections, and in rare cases, colitis.
Birth Control Pills Counseling: Birth Control Pill Counseling: I discussed with the patient the potential side effects of OCPs including but not limited to increased risk of stroke, heart attack, thrombophlebitis, deep venous thrombosis, hepatic adenomas, breast changes, GI upset, headaches, and depression.  The patient verbalized understanding of the proper use and possible adverse effects of OCPs. All of the patient's questions and concerns were addressed.
Siliq Pregnancy And Lactation Text: The risk during pregnancy and breastfeeding is uncertain with this medication.
Minoxidil Counseling: Minoxidil is a topical medication which can increase blood flow where it is applied. It is uncertain how this medication increases hair growth. Side effects are uncommon and include stinging and allergic reactions.
High Dose Vitamin A Pregnancy And Lactation Text: High dose vitamin A therapy is contraindicated during pregnancy and breast feeding.
Include Pregnancy/Lactation Warning?: No
Humira Counseling:  I discussed with the patient the risks of adalimumab including but not limited to myelosuppression, immunosuppression, autoimmune hepatitis, demyelinating diseases, lymphoma, and serious infections.  The patient understands that monitoring is required including a PPD at baseline and must alert us or the primary physician if symptoms of infection or other concerning signs are noted.
Dapsone Counseling: I discussed with the patient the risks of dapsone including but not limited to hemolytic anemia, agranulocytosis, rashes, methemoglobinemia, kidney failure, peripheral neuropathy, headaches, GI upset, and liver toxicity.  Patients who start dapsone require monitoring including baseline LFTs and weekly CBCs for the first month, then every month thereafter.  The patient verbalized understanding of the proper use and possible adverse effects of dapsone.  All of the patient's questions and concerns were addressed.
Metronidazole Pregnancy And Lactation Text: This medication is Pregnancy Category B and considered safe during pregnancy.  It is also excreted in breast milk.
Oxybutynin Pregnancy And Lactation Text: This medication is Pregnancy Category B and is considered safe during pregnancy. It is unknown if it is excreted in breast milk.
Ivermectin Counseling:  Patient instructed to take medication on an empty stomach with a full glass of water.  Patient informed of potential adverse effects including but not limited to nausea, diarrhea, dizziness, itching, and swelling of the extremities or lymph nodes.  The patient verbalized understanding of the proper use and possible adverse effects of ivermectin.  All of the patient's questions and concerns were addressed.
Topical Retinoid counseling:  Patient advised to apply a pea-sized amount only at bedtime and wait 30 minutes after washing their face before applying.  If too drying, patient may add a non-comedogenic moisturizer. The patient verbalized understanding of the proper use and possible adverse effects of retinoids.  All of the patient's questions and concerns were addressed.
Cellcept Pregnancy And Lactation Text: This medication is Pregnancy Category D and isn't considered safe during pregnancy. It is unknown if this medication is excreted in breast milk.
Valtrex Counseling: I discussed with the patient the risks of valacyclovir including but not limited to kidney damage, nausea, vomiting and severe allergy.  The patient understands that if the infection seems to be worsening or is not improving, they are to call.
Griseofulvin Pregnancy And Lactation Text: This medication is Pregnancy Category X and is known to cause serious birth defects. It is unknown if this medication is excreted in breast milk but breast feeding should be avoided.
Birth Control Pills Pregnancy And Lactation Text: This medication should be avoided if pregnant and for the first 30 days post-partum.
Valtrex Pregnancy And Lactation Text: this medication is Pregnancy Category B and is considered safe during pregnancy. This medication is not directly found in breast milk but it's metabolite acyclovir is present.
Simponi Counseling:  I discussed with the patient the risks of golimumab including but not limited to myelosuppression, immunosuppression, autoimmune hepatitis, demyelinating diseases, lymphoma, and serious infections.  The patient understands that monitoring is required including a PPD at baseline and must alert us or the primary physician if symptoms of infection or other concerning signs are noted.
Elidel Counseling: Patient may experience a mild burning sensation during topical application. Elidel is not approved in children less than 2 years of age. There have been case reports of hematologic and skin malignancies in patients using topical calcineurin inhibitors although causality is questionable.
Doxepin Counseling:  Patient advised that the medication is sedating and not to drive a car after taking this medication. Patient informed of potential adverse effects including but not limited to dry mouth, urinary retention, and blurry vision.  The patient verbalized understanding of the proper use and possible adverse effects of doxepin.  All of the patient's questions and concerns were addressed.
Minoxidil Pregnancy And Lactation Text: This medication has not been assigned a Pregnancy Risk Category but animal studies failed to show danger with the topical medication. It is unknown if the medication is excreted in breast milk.
Acitretin Counseling:  I discussed with the patient the risks of acitretin including but not limited to hair loss, dry lips/skin/eyes, liver damage, hyperlipidemia, depression/suicidal ideation, photosensitivity.  Serious rare side effects can include but are not limited to pancreatitis, pseudotumor cerebri, bony changes, clot formation/stroke/heart attack.  Patient understands that alcohol is contraindicated since it can result in liver toxicity and significantly prolong the elimination of the drug by many years.
Xelubaldoz Pregnancy And Lactation Text: This medication is Pregnancy Category D and is not considered safe during pregnancy.  The risk during breast feeding is also uncertain.
Xeljanz Counseling: I discussed with the patient the risks of Xeljanz therapy including increased risk of infection, liver issues, headache, diarrhea, or cold symptoms. Live vaccines should be avoided. They were instructed to call if they have any problems.
Dapsone Pregnancy And Lactation Text: This medication is Pregnancy Category C and is not considered safe during pregnancy or breast feeding.
Itraconazole Counseling:  I discussed with the patient the risks of itraconazole including but not limited to liver damage, nausea/vomiting, neuropathy, and severe allergy.  The patient understands that this medication is best absorbed when taken with acidic beverages such as non-diet cola or ginger ale.  The patient understands that monitoring is required including baseline LFTs and repeat LFTs at intervals.  The patient understands that they are to contact us or the primary physician if concerning signs are noted.
Cyclophosphamide Counseling:  I discussed with the patient the risks of cyclophosphamide including but not limited to hair loss, hormonal abnormalities, decreased fertility, abdominal pain, diarrhea, nausea and vomiting, bone marrow suppression and infection. The patient understands that monitoring is required while taking this medication.
Nsaids Counseling: NSAID Counseling: I discussed with the patient that NSAIDs should be taken with food. Prolonged use of NSAIDs can result in the development of stomach ulcers.  Patient advised to stop taking NSAIDs if abdominal pain occurs.  The patient verbalized understanding of the proper use and possible adverse effects of NSAIDs.  All of the patient's questions and concerns were addressed.
Minocycline Counseling: Patient advised regarding possible photosensitivity and discoloration of the teeth, skin, lips, tongue and gums.  Patient instructed to avoid sunlight, if possible.  When exposed to sunlight, patients should wear protective clothing, sunglasses, and sunscreen.  The patient was instructed to call the office immediately if the following severe adverse effects occur:  hearing changes, easy bruising/bleeding, severe headache, or vision changes.  The patient verbalized understanding of the proper use and possible adverse effects of minocycline.  All of the patient's questions and concerns were addressed.
Picato Counseling:  I discussed with the patient the risks of Picato including but not limited to erythema, scaling, itching, weeping, crusting, and pain.
Arava Counseling:  Patient counseled regarding adverse effects of Arava including but not limited to nausea, vomiting, abnormalities in liver function tests. Patients may develop mouth sores, rash, diarrhea, and abnormalities in blood counts. The patient understands that monitoring is required including LFTs and blood counts.  There is a rare possibility of scarring of the liver and lung problems that can occur when taking methotrexate. Persistent nausea, loss of appetite, pale stools, dark urine, cough, and shortness of breath should be reported immediately. Patient advised to discontinue Arava treatment and consult with a physician prior to attempting conception. The patient will have to undergo a treatment to eliminate Arava from the body prior to conception.
Itraconazole Pregnancy And Lactation Text: This medication is Pregnancy Category C and it isn't know if it is safe during pregnancy. It is also excreted in breast milk.
Cyclophosphamide Pregnancy And Lactation Text: This medication is Pregnancy Category D and it isn't considered safe during pregnancy. This medication is excreted in breast milk.
Spironolactone Counseling: Patient advised regarding risks of diarrhea, abdominal pain, hyperkalemia, birth defects (for female patients), liver toxicity and renal toxicity. The patient may need blood work to monitor liver and kidney function and potassium levels while on therapy. The patient verbalized understanding of the proper use and possible adverse effects of spironolactone.  All of the patient's questions and concerns were addressed.
Cosentyx Counseling:  I discussed with the patient the risks of Cosentyx including but not limited to worsening of Crohn's disease, immunosuppression, allergic reactions and infections.  The patient understands that monitoring is required including a PPD at baseline and must alert us or the primary physician if symptoms of infection or other concerning signs are noted.
Doxycycline Counseling:  Patient counseled regarding possible photosensitivity and increased risk for sunburn.  Patient instructed to avoid sunlight, if possible.  When exposed to sunlight, patients should wear protective clothing, sunglasses, and sunscreen.  The patient was instructed to call the office immediately if the following severe adverse effects occur:  hearing changes, easy bruising/bleeding, severe headache, or vision changes.  The patient verbalized understanding of the proper use and possible adverse effects of doxycycline.  All of the patient's questions and concerns were addressed.
Doxepin Pregnancy And Lactation Text: This medication is Pregnancy Category C and it isn't known if it is safe during pregnancy. It is also excreted in breast milk and breast feeding isn't recommended.
Acitretin Pregnancy And Lactation Text: This medication is Pregnancy Category X and should not be given to women who are pregnant or may become pregnant in the future. This medication is excreted in breast milk.
Benzoyl Peroxide Pregnancy And Lactation Text: This medication is Pregnancy Category C. It is unknown if benzoyl peroxide is excreted in breast milk.
Clindamycin Pregnancy And Lactation Text: This medication can be used in pregnancy if certain situations. Clindamycin is also present in breast milk.
Benzoyl Peroxide Counseling: Patient counseled that medicine may cause skin irritation and bleach clothing.  In the event of skin irritation, the patient was advised to reduce the amount of the drug applied or use it less frequently.   The patient verbalized understanding of the proper use and possible adverse effects of benzoyl peroxide.  All of the patient's questions and concerns were addressed.
Erivedge Counseling- I discussed with the patient the risks of Erivedge including but not limited to nausea, vomiting, diarrhea, constipation, weight loss, changes in the sense of taste, decreased appetite, muscle spasms, and hair loss.  The patient verbalized understanding of the proper use and possible adverse effects of Erivedge.  All of the patient's questions and concerns were addressed.
Tazorac Counseling:  Patient advised that medication is irritating and drying.  Patient may need to apply sparingly and wash off after an hour before eventually leaving it on overnight.  The patient verbalized understanding of the proper use and possible adverse effects of tazorac.  All of the patient's questions and concerns were addressed.
Azithromycin Counseling:  I discussed with the patient the risks of azithromycin including but not limited to GI upset, allergic reaction, drug rash, diarrhea, and yeast infections.
Ilumya Counseling: I discussed with the patient the risks of tildrakizumab including but not limited to immunosuppression, malignancy, posterior leukoencephalopathy syndrome, and serious infections.  The patient understands that monitoring is required including a PPD at baseline and must alert us or the primary physician if symptoms of infection or other concerning signs are noted.
Nsaids Pregnancy And Lactation Text: These medications are considered safe up to 30 weeks gestation. It is excreted in breast milk.
Hydroxyzine Counseling: Patient advised that the medication is sedating and not to drive a car after taking this medication.  Patient informed of potential adverse effects including but not limited to dry mouth, urinary retention, and blurry vision.  The patient verbalized understanding of the proper use and possible adverse effects of hydroxyzine.  All of the patient's questions and concerns were addressed.
Bexarotene Counseling:  I discussed with the patient the risks of bexarotene including but not limited to hair loss, dry lips/skin/eyes, liver abnormalities, hyperlipidemia, pancreatitis, depression/suicidal ideation, photosensitivity, drug rash/allergic reactions, hypothyroidism, anemia, leukopenia, infection, cataracts, and teratogenicity.  Patient understands that they will need regular blood tests to check lipid profile, liver function tests, white blood cell count, thyroid function tests and pregnancy test if applicable.
Gabapentin Counseling: I discussed with the patient the risks of gabapentin including but not limited to dizziness, somnolence, fatigue and ataxia.
Detail Level: Zone
Odomzo Counseling- I discussed with the patient the risks of Odomzo including but not limited to nausea, vomiting, diarrhea, constipation, weight loss, changes in the sense of taste, decreased appetite, muscle spasms, and hair loss.  The patient verbalized understanding of the proper use and possible adverse effects of Odomzo.  All of the patient's questions and concerns were addressed.
Xolair Pregnancy And Lactation Text: This medication is Pregnancy Category B and is considered safe during pregnancy. This medication is excreted in breast milk.
Spironolactone Pregnancy And Lactation Text: This medication can cause feminization of the male fetus and should be avoided during pregnancy. The active metabolite is also found in breast milk.
Infliximab Counseling:  I discussed with the patient the risks of infliximab including but not limited to myelosuppression, immunosuppression, autoimmune hepatitis, demyelinating diseases, lymphoma, and serious infections.  The patient understands that monitoring is required including a PPD at baseline and must alert us or the primary physician if symptoms of infection or other concerning signs are noted.
Carac Counseling:  I discussed with the patient the risks of Carac including but not limited to erythema, scaling, itching, weeping, crusting, and pain.
Xolair Counseling:  Patient informed of potential adverse effects including but not limited to fever, muscle aches, rash and allergic reactions.  The patient verbalized understanding of the proper use and possible adverse effects of Xolair.  All of the patient's questions and concerns were addressed.
Eucrisa Counseling: Patient may experience a mild burning sensation during topical application. Eucrisa is not approved in children less than 2 years of age.
Cyclosporine Counseling:  I discussed with the patient the risks of cyclosporine including but not limited to hypertension, gingival hyperplasia,myelosuppression, immunosuppression, liver damage, kidney damage, neurotoxicity, lymphoma, and serious infections. The patient understands that monitoring is required including baseline blood pressure, CBC, CMP, lipid panel and uric acid, and then 1-2 times monthly CMP and blood pressure.
Stelara Counseling:  I discussed with the patient the risks of ustekinumab including but not limited to immunosuppression, malignancy, posterior leukoencephalopathy syndrome, and serious infections.  The patient understands that monitoring is required including a PPD at baseline and must alert us or the primary physician if symptoms of infection or other concerning signs are noted.
Ketoconazole Counseling:   Patient counseled regarding improving absorption with orange juice.  Adverse effects include but are not limited to breast enlargement, headache, diarrhea, nausea, upset stomach, liver function test abnormalities, taste disturbance, and stomach pain.  There is a rare possibility of liver failure that can occur when taking ketoconazole. The patient understands that monitoring of LFTs may be required, especially at baseline. The patient verbalized understanding of the proper use and possible adverse effects of ketoconazole.  All of the patient's questions and concerns were addressed.
Quinolones Counseling:  I discussed with the patient the risks of fluoroquinolones including but not limited to GI upset, allergic reaction, drug rash, diarrhea, dizziness, photosensitivity, yeast infections, liver function test abnormalities, tendonitis/tendon rupture.
Tazorac Pregnancy And Lactation Text: This medication is not safe during pregnancy. It is unknown if this medication is excreted in breast milk.
Azithromycin Pregnancy And Lactation Text: This medication is considered safe during pregnancy and is also secreted in breast milk.
Protopic Counseling: Patient may experience a mild burning sensation during topical application. Protopic is not approved in children less than 2 years of age. There have been case reports of hematologic and skin malignancies in patients using topical calcineurin inhibitors although causality is questionable.
Dupixent Counseling: I discussed with the patient the risks of dupilumab including but not limited to eye infection and irritation, cold sores, injection site reactions, worsening of asthma, allergic reactions and increased risk of parasitic infection.  Live vaccines should be avoided while taking dupilumab. Dupilumab will also interact with certain medications such as warfarin and cyclosporine. The patient understands that monitoring is required and they must alert us or the primary physician if symptoms of infection or other concerning signs are noted.
SSKI Counseling:  I discussed with the patient the risks of SSKI including but not limited to thyroid abnormalities, metallic taste, GI upset, fever, headache, acne, arthralgias, paraesthesias, lymphadenopathy, easy bleeding, arrhythmias, and allergic reaction.
Bactrim Counseling:  I discussed with the patient the risks of sulfa antibiotics including but not limited to GI upset, allergic reaction, drug rash, diarrhea, dizziness, photosensitivity, and yeast infections.  Rarely, more serious reactions can occur including but not limited to aplastic anemia, agranulocytosis, methemoglobinemia, blood dyscrasias, liver or kidney failure, lung infiltrates or desquamative/blistering drug rashes.
Cimzia Counseling:  I discussed with the patient the risks of Cimzia including but not limited to immunosuppression, allergic reactions and infections.  The patient understands that monitoring is required including a PPD at baseline and must alert us or the primary physician if symptoms of infection or other concerning signs are noted.
Erythromycin Counseling:  I discussed with the patient the risks of erythromycin including but not limited to GI upset, allergic reaction, drug rash, diarrhea, increase in liver enzymes, and yeast infections.
Doxycycline Pregnancy And Lactation Text: This medication is Pregnancy Category D and not consider safe during pregnancy. It is also excreted in breast milk but is considered safe for shorter treatment courses.
Ketoconazole Pregnancy And Lactation Text: This medication is Pregnancy Category C and it isn't know if it is safe during pregnancy. It is also excreted in breast milk and breast feeding isn't recommended.
Topical Clindamycin Counseling: Patient counseled that this medication may cause skin irritation or allergic reactions.  In the event of skin irritation, the patient was advised to reduce the amount of the drug applied or use it less frequently.   The patient verbalized understanding of the proper use and possible adverse effects of clindamycin.  All of the patient's questions and concerns were addressed.
Bexarotene Pregnancy And Lactation Text: This medication is Pregnancy Category X and should not be given to women who are pregnant or may become pregnant. This medication should not be used if you are breast feeding.
Clofazimine Counseling:  I discussed with the patient the risks of clofazimine including but not limited to skin and eye pigmentation, liver damage, nausea/vomiting, gastrointestinal bleeding and allergy.
Hydroxyzine Pregnancy And Lactation Text: This medication is not safe during pregnancy and should not be taken. It is also excreted in breast milk and breast feeding isn't recommended.
Bactrim Pregnancy And Lactation Text: This medication is Pregnancy Category D and is known to cause fetal risk.  It is also excreted in breast milk.
Azathioprine Counseling:  I discussed with the patient the risks of azathioprine including but not limited to myelosuppression, immunosuppression, hepatotoxicity, lymphoma, and infections.  The patient understands that monitoring is required including baseline LFTs, Creatinine, possible TPMP genotyping and weekly CBCs for the first month and then every 2 weeks thereafter.  The patient verbalized understanding of the proper use and possible adverse effects of azathioprine.  All of the patient's questions and concerns were addressed.
Fluconazole Counseling:  Patient counseled regarding adverse effects of fluconazole including but not limited to headache, diarrhea, nausea, upset stomach, liver function test abnormalities, taste disturbance, and stomach pain.  There is a rare possibility of liver failure that can occur when taking fluconazole.  The patient understands that monitoring of LFTs and kidney function test may be required, especially at baseline. The patient verbalized understanding of the proper use and possible adverse effects of fluconazole.  All of the patient's questions and concerns were addressed.
Protopic Pregnancy And Lactation Text: This medication is Pregnancy Category C. It is unknown if this medication is excreted in breast milk when applied topically.
Isotretinoin Counseling: Patient should get monthly blood tests, not donate blood, not drive at night if vision affected, not share medication, and not undergo elective surgery for 6 months after tx completed. Side effects reviewed, pt to contact office should one occur.
Rituxan Counseling:  I discussed with the patient the risks of Rituxan infusions. Side effects can include infusion reactions, severe drug rashes including mucocutaneous reactions, reactivation of latent hepatitis and other infections and rarely progressive multifocal leukoencephalopathy.  All of the patient's questions and concerns were addressed.
Glycopyrrolate Counseling:  I discussed with the patient the risks of glycopyrrolate including but not limited to skin rash, drowsiness, dry mouth, difficulty urinating, and blurred vision.
5-Fu Counseling: 5-Fluorouracil Counseling:  I discussed with the patient the risks of 5-fluorouracil including but not limited to erythema, scaling, itching, weeping, crusting, and pain.
Otezla Counseling: The side effects of Otezla were discussed with the patient, including but not limited to worsening or new depression, weight loss, diarrhea, nausea, upper respiratory tract infection, and headache. Patient instructed to call the office should any adverse effect occur.  The patient verbalized understanding of the proper use and possible adverse effects of Otezla.  All the patient's questions and concerns were addressed.
Terbinafine Counseling: Patient counseling regarding adverse effects of terbinafine including but not limited to headache, diarrhea, rash, upset stomach, liver function test abnormalities, itching, taste/smell disturbance, nausea, abdominal pain, and flatulence.  There is a rare possibility of liver failure that can occur when taking terbinafine.  The patient understands that a baseline LFT and kidney function test may be required. The patient verbalized understanding of the proper use and possible adverse effects of terbinafine.  All of the patient's questions and concerns were addressed.
Methotrexate Counseling:  Patient counseled regarding adverse effects of methotrexate including but not limited to nausea, vomiting, abnormalities in liver function tests. Patients may develop mouth sores, rash, diarrhea, and abnormalities in blood counts. The patient understands that monitoring is required including LFT's and blood counts.  There is a rare possibility of scarring of the liver and lung problems that can occur when taking methotrexate. Persistent nausea, loss of appetite, pale stools, dark urine, cough, and shortness of breath should be reported immediately. Patient advised to discontinue methotrexate treatment at least three months before attempting to become pregnant.  I discussed the need for folate supplements while taking methotrexate.  These supplements can decrease side effects during methotrexate treatment. The patient verbalized understanding of the proper use and possible adverse effects of methotrexate.  All of the patient's questions and concerns were addressed.
Carac Pregnancy And Lactation Text: This medication is Pregnancy Category X and contraindicated in pregnancy and in women who may become pregnant. It is unknown if this medication is excreted in breast milk.
Dupixent Pregnancy And Lactation Text: This medication likely crosses the placenta but the risk for the fetus is uncertain. This medication is excreted in breast milk.
Taltz Counseling: I discussed with the patient the risks of ixekizumab including but not limited to immunosuppression, serious infections, worsening of inflammatory bowel disease and drug reactions.  The patient understands that monitoring is required including a PPD at baseline and must alert us or the primary physician if symptoms of infection or other concerning signs are noted.
Sski Pregnancy And Lactation Text: This medication is Pregnancy Category D and isn't considered safe during pregnancy. It is excreted in breast milk.
Hydroquinone Counseling:  Patient advised that medication may result in skin irritation, lightening (hypopigmentation), dryness, and burning.  In the event of skin irritation, the patient was advised to reduce the amount of the drug applied or use it less frequently.  Rarely, spots that are treated with hydroquinone can become darker (pseudoochronosis).  Should this occur, patient instructed to stop medication and call the office. The patient verbalized understanding of the proper use and possible adverse effects of hydroquinone.  All of the patient's questions and concerns were addressed.
Cimzia Pregnancy And Lactation Text: This medication crosses the placenta but can be considered safe in certain situations. Cimzia may be excreted in breast milk.
Rifampin Counseling: I discussed with the patient the risks of rifampin including but not limited to liver damage, kidney damage, red-orange body fluids, nausea/vomiting and severe allergy.
Thalidomide Counseling: I discussed with the patient the risks of thalidomide including but not limited to birth defects, anxiety, weakness, chest pain, dizziness, cough and severe allergy.
Enbrel Counseling:  I discussed with the patient the risks of etanercept including but not limited to myelosuppression, immunosuppression, autoimmune hepatitis, demyelinating diseases, lymphoma, and infections.  The patient understands that monitoring is required including a PPD at baseline and must alert us or the primary physician if symptoms of infection or other concerning signs are noted.
Solaraze Counseling:  I discussed with the patient the risks of Solaraze including but not limited to erythema, scaling, itching, weeping, crusting, and pain.
Glycopyrrolate Pregnancy And Lactation Text: This medication is Pregnancy Category B and is considered safe during pregnancy. It is unknown if it is excreted breast milk.
Rifampin Pregnancy And Lactation Text: This medication is Pregnancy Category C and it isn't know if it is safe during pregnancy. It is also excreted in breast milk and should not be used if you are breast feeding.
Cephalexin Counseling: I counseled the patient regarding use of cephalexin as an antibiotic for prophylactic and/or therapeutic purposes. Cephalexin (commonly prescribed under brand name Keflex) is a cephalosporin antibiotic which is active against numerous classes of bacteria, including most skin bacteria. Side effects may include nausea, diarrhea, gastrointestinal upset, rash, hives, yeast infections, and in rare cases, hepatitis, kidney disease, seizures, fever, confusion, neurologic symptoms, and others. Patients with severe allergies to penicillin medications are cautioned that there is about a 10% incidence of cross-reactivity with cephalosporins. When possible, patients with penicillin allergies should use alternatives to cephalosporins for antibiotic therapy.
Imiquimod Counseling:  I discussed with the patient the risks of imiquimod including but not limited to erythema, scaling, itching, weeping, crusting, and pain.  Patient understands that the inflammatory response to imiquimod is variable from person to person and was educated regarded proper titration schedule.  If flu-like symptoms develop, patient knows to discontinue the medication and contact us.
Rituxan Pregnancy And Lactation Text: This medication is Pregnancy Category C and it isn't know if it is safe during pregnancy. It is unknown if this medication is excreted in breast milk but similar antibodies are known to be excreted.
Topical Sulfur Applications Pregnancy And Lactation Text: This medication is Pregnancy Category C and has an unknown safety profile during pregnancy. It is unknown if this topical medication is excreted in breast milk.
Metronidazole Counseling:  I discussed with the patient the risks of metronidazole including but not limited to seizures, nausea/vomiting, a metallic taste in the mouth, nausea/vomiting and severe allergy.
Topical Sulfur Applications Counseling: Topical Sulfur Counseling: Patient counseled that this medication may cause skin irritation or allergic reactions.  In the event of skin irritation, the patient was advised to reduce the amount of the drug applied or use it less frequently.   The patient verbalized understanding of the proper use and possible adverse effects of topical sulfur application.  All of the patient's questions and concerns were addressed.
Erythromycin Pregnancy And Lactation Text: This medication is Pregnancy Category B and is considered safe during pregnancy. It is also excreted in breast milk.
Methotrexate Pregnancy And Lactation Text: This medication is Pregnancy Category X and is known to cause fetal harm. This medication is excreted in breast milk.
Otezla Pregnancy And Lactation Text: This medication is Pregnancy Category C and it isn't known if it is safe during pregnancy. It is unknown if it is excreted in breast milk.
Albendazole Counseling:  I discussed with the patient the risks of albendazole including but not limited to cytopenia, kidney damage, nausea/vomiting and severe allergy.  The patient understands that this medication is being used in an off-label manner.
Isotretinoin Pregnancy And Lactation Text: This medication is Pregnancy Category X and is considered extremely dangerous during pregnancy. It is unknown if it is excreted in breast milk.
Colchicine Counseling:  Patient counseled regarding adverse effects including but not limited to stomach upset (nausea, vomiting, stomach pain, or diarrhea).  Patient instructed to limit alcohol consumption while taking this medication.  Colchicine may reduce blood counts especially with prolonged use.  The patient understands that monitoring of kidney function and blood counts may be required, especially at baseline. The patient verbalized understanding of the proper use and possible adverse effects of colchicine.  All of the patient's questions and concerns were addressed.

## 2019-07-29 NOTE — PROCEDURE: OBSERVATION
Detail Level: Detailed
Morphology Per Location (Optional): Present for as long as patient can remember
Size Of Lesion: 3 mm pink domed papule

## 2019-07-29 NOTE — HPI: SKIN LESION
Spoke with patient scheduled appt 03/13/19.  
Is This A New Presentation, Or A Follow-Up?: Growths
What Type Of Note Output Would You Prefer (Optional)?: Bullet Format
How Severe Is Your Skin Lesion?: mild
Has Your Skin Lesion Been Treated?: not been treated

## 2019-07-29 NOTE — PROCEDURE: COUNSELING
Detail Level: Zone
Patient Specific Counseling (Will Not Stick From Patient To Patient): Minoxidil OTC for daily maintenance
Detail Level: Detailed

## 2019-07-29 NOTE — HPI: SECONDARY COMPLAINT
How Severe Is This Condition?: mild
Additional History: Patient has received instructions Lamisil cell but has not started yet. Wants to discuss medication and treatment further.
Additional History: No itching, no scale

## 2019-09-30 ENCOUNTER — RX ONLY (OUTPATIENT)
Age: 59
Setting detail: RX ONLY
End: 2019-09-30

## 2019-09-30 RX ORDER — TERBINAFINE HYDROCHLORIDE 250 MG/1
1 TABLET ORAL QD
Qty: 7 | Refills: 0 | Status: ERX

## 2019-10-22 ENCOUNTER — OFFICE VISIT (OUTPATIENT)
Dept: CARDIOLOGY | Facility: MEDICAL CENTER | Age: 59
End: 2019-10-22
Payer: COMMERCIAL

## 2019-10-22 VITALS
HEART RATE: 72 BPM | OXYGEN SATURATION: 97 % | SYSTOLIC BLOOD PRESSURE: 120 MMHG | BODY MASS INDEX: 20.96 KG/M2 | DIASTOLIC BLOOD PRESSURE: 80 MMHG | HEIGHT: 61 IN | WEIGHT: 111 LBS

## 2019-10-22 DIAGNOSIS — Z82.49 FAMILY HISTORY OF PREMATURE CAD: ICD-10-CM

## 2019-10-22 DIAGNOSIS — Z82.3 FAMILY HISTORY OF STROKE: ICD-10-CM

## 2019-10-22 PROCEDURE — 99213 OFFICE O/P EST LOW 20 MIN: CPT | Performed by: INTERNAL MEDICINE

## 2019-10-22 ASSESSMENT — ENCOUNTER SYMPTOMS
PALPITATIONS: 0
CONSTITUTIONAL NEGATIVE: 1
DEPRESSION: 0
GASTROINTESTINAL NEGATIVE: 1
CARDIOVASCULAR NEGATIVE: 1
RESPIRATORY NEGATIVE: 1
NEUROLOGICAL NEGATIVE: 1
MUSCULOSKELETAL NEGATIVE: 1

## 2019-10-22 NOTE — PROGRESS NOTES
Chief Complaint   Patient presents with   • Chest Pain     Previous patient        Subjective:   Anisa Murillo is a 59 y.o. female who presents today for evaluation of her cardiac risks.  She has a strong family history of coronary disease with a stroke in her sister at age 59, mother had a heart attack in her 60s and another sister had a coronary intervention in his 60s.  The patient is a lifelong non-smoker.  Labs reveal high LDL.  She is not diabetic and not hypertensive.  The patient does not exercise regularly.    She was seen in the past for chest pain felt to be noncardiac in etiology.  This has resolved.     Past Medical History:   Diagnosis Date   • Anxiety 3/24/2015   • Contusion 10/26/2015   • Eczema 3/22/2016   • Herpes simplex type 1 infection 10/26/2015   • Hypertension    • Insomnia    • Osteopenia 3/24/2015     Past Surgical History:   Procedure Laterality Date   • PB ENLARGE BREAST WITH IMPLANT       Family History   Problem Relation Age of Onset   • Cancer Mother         breast    • Diabetes Mother    • Heart Disease Mother 80        carotid disease and CEA   • Stroke Father    • Stroke Sister    • Genetic Disorder Son         autism      Social History     Socioeconomic History   • Marital status: Single     Spouse name: Not on file   • Number of children: Not on file   • Years of education: Not on file   • Highest education level: Not on file   Occupational History   • Not on file   Social Needs   • Financial resource strain: Not on file   • Food insecurity:     Worry: Not on file     Inability: Not on file   • Transportation needs:     Medical: Not on file     Non-medical: Not on file   Tobacco Use   • Smoking status: Former Smoker     Years: 1.00     Types: Cigarettes   • Smokeless tobacco: Never Used   Substance and Sexual Activity   • Alcohol use: Yes   • Drug use: No   • Sexual activity: Yes     Partners: Male     Birth control/protection: Post-Menopausal   Lifestyle   • Physical  "activity:     Days per week: Not on file     Minutes per session: Not on file   • Stress: Not on file   Relationships   • Social connections:     Talks on phone: Not on file     Gets together: Not on file     Attends [employer] service: Not on file     Active member of club or organization: Not on file     Attends meetings of clubs or organizations: Not on file     Relationship status: Not on file   • Intimate partner violence:     Fear of current or ex partner: Not on file     Emotionally abused: Not on file     Physically abused: Not on file     Forced sexual activity: Not on file   Other Topics Concern   • Not on file   Social History Narrative   • Not on file     No Known Allergies  Outpatient Encounter Medications as of 10/22/2019   Medication Sig Dispense Refill   • valACYclovir (VALTREX) 500 MG Tab Take 500 mg by mouth every day.  3   • Multiple Vitamin (MULTI-VITAMINS PO) Take  by mouth.     • Ascorbic Acid (CORNELIO-C PO) Take  by mouth.     • traZODone (DESYREL) 100 MG Tab Take 100 mg by mouth every evening.     • FIBER SELECT GUMMIES PO Take  by mouth.     • vitamin D (CHOLECALCIFEROL) 1000 UNIT TABS Take 2,000 Units by mouth every day.     • Calcium-Vitamin D (CALCIUM + D PO) Take  by mouth.       No facility-administered encounter medications on file as of 10/22/2019.      Review of Systems   Constitutional: Negative.    HENT: Negative.    Respiratory: Negative.    Cardiovascular: Negative.  Negative for chest pain, palpitations and leg swelling.   Gastrointestinal: Negative.    Musculoskeletal: Negative.    Skin: Negative.    Neurological: Negative.    Endo/Heme/Allergies: Negative.    Psychiatric/Behavioral: Negative for depression.        Objective:   /80 (BP Location: Right arm, Patient Position: Sitting, BP Cuff Size: Adult)   Pulse 72   Ht 1.549 m (5' 1\")   Wt 50.3 kg (111 lb)   SpO2 97%   BMI 20.97 kg/m²     Physical Exam   Constitutional: She is oriented to person, place, and time. No " distress.   HENT:   Head: Normocephalic and atraumatic.   Eyes: Pupils are equal, round, and reactive to light. No scleral icterus.   Neck: No JVD present.   Cardiovascular: Normal rate and regular rhythm.   No murmur heard.  Pulmonary/Chest: No respiratory distress. She has no wheezes.   Abdominal: Soft. She exhibits no distension. There is no tenderness.   Musculoskeletal: She exhibits no edema.   Neurological: She is alert and oriented to person, place, and time.   Skin: Skin is warm.   Psychiatric: She has a normal mood and affect.       Assessment:     1. Family history of stroke  CT-CARDIAC SCORING   2. Family history of premature CAD  CT-CARDIAC SCORING       Medical Decision Making:  Today's Assessment / Status / Plan:   Family history of premature coronary disease and stroke: Patient has no obvious risk factors.  HDL is high.  We discussed getting coronary artery calcification score to help risk stratify her.  Recent stress testing was normal.  She will be notified of the results and return as needed.

## 2019-10-29 ENCOUNTER — HOSPITAL ENCOUNTER (OUTPATIENT)
Dept: RADIOLOGY | Facility: MEDICAL CENTER | Age: 59
End: 2019-10-29
Attending: INTERNAL MEDICINE
Payer: COMMERCIAL

## 2019-10-29 DIAGNOSIS — Z82.49 FAMILY HISTORY OF PREMATURE CAD: ICD-10-CM

## 2019-10-29 DIAGNOSIS — Z82.3 FAMILY HISTORY OF STROKE: ICD-10-CM

## 2019-10-29 PROCEDURE — 4410556 CT-CARDIAC SCORING

## 2019-10-31 ENCOUNTER — TELEPHONE (OUTPATIENT)
Dept: CARDIOLOGY | Facility: MEDICAL CENTER | Age: 59
End: 2019-10-31

## 2019-10-31 NOTE — TELEPHONE ENCOUNTER
----- Message from Chavo Oviedo M.D. sent at 10/31/2019  8:08 AM PDT -----  Coronary calcification score is 0.  This is excellent news and put in a very low risk group for having any heart attacks in the future.

## 2019-10-31 NOTE — TELEPHONE ENCOUNTER
RS  Pt is calling for results of CT Cardiac Scoring test, she can be reached at cell # [phone].

## 2019-11-27 ENCOUNTER — OFFICE VISIT (OUTPATIENT)
Dept: MEDICAL GROUP | Facility: PHYSICIAN GROUP | Age: 59
End: 2019-11-27
Payer: COMMERCIAL

## 2019-11-27 VITALS
WEIGHT: 111 LBS | HEIGHT: 61 IN | DIASTOLIC BLOOD PRESSURE: 78 MMHG | SYSTOLIC BLOOD PRESSURE: 110 MMHG | BODY MASS INDEX: 20.96 KG/M2 | RESPIRATION RATE: 16 BRPM | TEMPERATURE: 98.8 F | HEART RATE: 78 BPM | OXYGEN SATURATION: 95 %

## 2019-11-27 DIAGNOSIS — Z00.00 ENCOUNTER FOR WELLNESS EXAMINATION: ICD-10-CM

## 2019-11-27 DIAGNOSIS — Z23 NEED FOR VACCINATION: ICD-10-CM

## 2019-11-27 DIAGNOSIS — Z11.59 NEED FOR HEPATITIS C SCREENING TEST: ICD-10-CM

## 2019-11-27 PROBLEM — N94.9 VAGINAL BURNING: Status: RESOLVED | Noted: 2018-08-07 | Resolved: 2019-11-27

## 2019-11-27 PROBLEM — N94.89 VAGINAL BURNING: Status: RESOLVED | Noted: 2018-08-07 | Resolved: 2019-11-27

## 2019-11-27 PROBLEM — R30.0 DYSURIA: Status: RESOLVED | Noted: 2018-08-07 | Resolved: 2019-11-27

## 2019-11-27 PROBLEM — Z76.89 ENCOUNTER TO ESTABLISH CARE WITH NEW DOCTOR: Status: RESOLVED | Noted: 2018-08-29 | Resolved: 2019-11-27

## 2019-11-27 PROCEDURE — 90471 IMMUNIZATION ADMIN: CPT | Performed by: NURSE PRACTITIONER

## 2019-11-27 PROCEDURE — 90686 IIV4 VACC NO PRSV 0.5 ML IM: CPT | Performed by: NURSE PRACTITIONER

## 2019-11-27 PROCEDURE — 99396 PREV VISIT EST AGE 40-64: CPT | Mod: 25 | Performed by: NURSE PRACTITIONER

## 2019-11-27 RX ORDER — TERBINAFINE HYDROCHLORIDE 250 MG/1
TABLET ORAL
Refills: 0 | COMMUNITY
Start: 2019-09-30 | End: 2019-11-27

## 2019-11-27 SDOH — HEALTH STABILITY: MENTAL HEALTH: HOW OFTEN DO YOU HAVE A DRINK CONTAINING ALCOHOL?: 4 OR MORE TIMES A WEEK

## 2019-11-27 NOTE — ASSESSMENT & PLAN NOTE
Is here for her annual wellness visit.  Has no current complaints.  Is due for labs.    Sees Dr. Espinoza for gyn. Had her last pap in August.  It was normal.    Due for her next mammogram in May 2020.  Colonoscopy was done in 2013.  Tdap was given in 2015.  Due for flu shot today.    Used to smoke but quit many years ago.  Drinks alcohol a few times a week, usually wine.  Does not use drugs.

## 2019-11-27 NOTE — PROGRESS NOTES
Chief Complaint   Patient presents with   • Annual Exam       HISTORY OF PRESENT ILLNESS: Patient is a 59 y.o. female established patient who presents today to discuss the following issues:    Encounter for wellness examination  Is here for her annual wellness visit.  Has no current complaints.  Is due for labs.    Sees Dr. Espinoza for gyn. Had her last pap in August.  It was normal.    Due for her next mammogram in May 2020.  Colonoscopy was done in 2013.  Tdap was given in 2015.  Due for flu shot today.    Used to smoke but quit many years ago.  Drinks alcohol a few times a week, usually wine.  Does not use drugs.      Need for vaccination  Would like a flu shot today.        Patient Active Problem List    Diagnosis Date Noted   • Macrocytosis without anemia 03/28/2014     Priority: Medium   • Allergic symptoms 10/23/2014     Priority: Low     Class: Minor   • Encounter for wellness examination 11/27/2019   • Need for vaccination 11/27/2019   • Shortness of breath 04/16/2019   • Family history of stroke 02/27/2018   • Family history of premature CAD 02/27/2018   • Midline low back pain without sciatica 06/24/2016   • Eczema 03/22/2016   • Herpes simplex type 1 infection 10/26/2015   • Osteopenia 03/24/2015   • Anxiety 03/24/2015   • Insomnia 03/24/2015       Allergies:Patient has no known allergies.    Current Outpatient Medications   Medication Sig Dispense Refill   • valACYclovir (VALTREX) 500 MG Tab Take 500 mg by mouth every day.  3   • Multiple Vitamin (MULTI-VITAMINS PO) Take  by mouth.     • Ascorbic Acid (CORNELIO-C PO) Take  by mouth.     • traZODone (DESYREL) 100 MG Tab Take 100 mg by mouth every evening.     • FIBER SELECT GUMMIES PO Take  by mouth.     • vitamin D (CHOLECALCIFEROL) 1000 UNIT TABS Take 2,000 Units by mouth every day.     • Calcium-Vitamin D (CALCIUM + D PO) Take  by mouth.       No current facility-administered medications for this visit.        Social History     Tobacco Use   • Smoking  "status: Former Smoker     Packs/day: 0.00     Years: 1.00     Pack years: 0.00     Types: Cigarettes   • Smokeless tobacco: Never Used   Substance Use Topics   • Alcohol use: Yes     Frequency: 4 or more times a week   • Drug use: No       Family Status   Relation Name Status   • Mo  Alive        cancer   • Fa  Other        unknown   • Sis  Alive   • Son  Alive   • Bro  Alive   • Sis  Alive   • Sis  Alive   • Son  Alive   • Phill  Alive     Family History   Problem Relation Age of Onset   • Cancer Mother         breast    • Diabetes Mother    • Heart Disease Mother 80        carotid disease and CEA   • Stroke Father    • Stroke Sister    • Genetic Disorder Son         autism        Review of Systems:   Constitutional: Negative for fever, chills, weight loss and malaise/fatigue.   HENT: Negative for ear pain, nosebleeds, congestion, sore throat and neck pain.    Eyes: Negative for blurred vision.   Respiratory: Negative for cough, sputum production, shortness of breath and wheezing.    Cardiovascular: Negative for chest pain, palpitations, orthopnea and leg swelling.   Gastrointestinal: Negative for heartburn, nausea, vomiting and abdominal pain.   Genitourinary: Negative for dysuria, urgency and frequency.   Musculoskeletal: Negative for myalgias, joint pain, and back pain.  Skin: Negative for rash and itching.   Neurological: Negative for dizziness, tingling, tremors, sensory change, focal weakness and headaches.   Endo/Heme/Allergies: Does not bruise/bleed easily.   Psychiatric/Behavioral: Negative for depression, suicidal ideas and memory loss.  The patient is not nervous/anxious and does not have insomnia.    All other systems reviewed and are negative except as in HPI.    Exam:  Blood Pressure 110/78   Pulse 78   Temperature 37.1 °C (98.8 °F)   Respiration 16   Height 1.549 m (5' 1\")   Weight 50.3 kg (111 lb)   Oxygen Saturation 95%   General:  Well nourished, well developed female in NAD  Head: Grossly " normal.  Neck: Supple without JVD or bruit. Thyroid is not enlarged.  Pulmonary: Clear to ausculation. Normal effort. No rales, ronchi, or wheezing.  Cardiovascular: Regular rate and rhythm without murmur.   Abdomen:  Soft, nontender, nondistended.  Extremities: No clubbing, cyanosis, or edema.  Skin: Intact with no obvious rashes or lesions.  Neuro: Grossly intact.  Psych: Alert and oriented x 3.  Mood and affect appropriate.    Medical decision-making and discussion: Anisa is here today to discuss wellness.  Her records were reviewed, lab work was ordered, and she was given a flu shot.  She will follow-up here as needed.    I have placed the below orders and discussed them with an approved delegating provider. The MA is performing the below orders under the direction of Dr. Coronel, who have provided verbal consent for supervision.            Assessment/Plan:  1. Need for vaccination  Influenza Vaccine Quad Injection (PF)   2. Encounter for wellness examination  Influenza Vaccine Quad Injection (PF)    CBC WITH DIFFERENTIAL    Comp Metabolic Panel    Lipid Profile    TSH    VITAMIN D,25 HYDROXY   3. Need for hepatitis C screening test  HEP C VIRUS ANTIBODY       Return if symptoms worsen or fail to improve.    Please note that this dictation was created using voice recognition software. I have made every reasonable attempt to correct obvious errors, but I expect that there are errors of grammar and possibly content that I did not discover before finalizing the note.

## 2019-11-30 LAB
25(OH)D3+25(OH)D2 SERPL-MCNC: 37.6 NG/ML (ref 30–100)
ALBUMIN SERPL-MCNC: 4.5 G/DL (ref 3.6–4.8)
ALBUMIN/GLOB SERPL: 2 {RATIO} (ref 1.2–2.2)
ALP SERPL-CCNC: 64 IU/L (ref 39–117)
ALT SERPL-CCNC: 16 IU/L (ref 0–32)
AST SERPL-CCNC: 20 IU/L (ref 0–40)
BASOPHILS # BLD AUTO: 0 X10E3/UL (ref 0–0.2)
BASOPHILS NFR BLD AUTO: 1 %
BILIRUB SERPL-MCNC: 0.3 MG/DL (ref 0–1.2)
BUN SERPL-MCNC: 13 MG/DL (ref 8–27)
BUN/CREAT SERPL: 25 (ref 12–28)
CALCIUM SERPL-MCNC: 9.3 MG/DL (ref 8.7–10.3)
CHLORIDE SERPL-SCNC: 102 MMOL/L (ref 96–106)
CHOLEST SERPL-MCNC: 215 MG/DL (ref 100–199)
CO2 SERPL-SCNC: 23 MMOL/L (ref 20–29)
CREAT SERPL-MCNC: 0.52 MG/DL (ref 0.57–1)
EOSINOPHIL # BLD AUTO: 0.1 X10E3/UL (ref 0–0.4)
EOSINOPHIL NFR BLD AUTO: 2 %
ERYTHROCYTE [DISTWIDTH] IN BLOOD BY AUTOMATED COUNT: 11.9 % (ref 12.3–15.4)
GLOBULIN SER CALC-MCNC: 2.3 G/DL (ref 1.5–4.5)
GLUCOSE SERPL-MCNC: 100 MG/DL (ref 65–99)
HCT VFR BLD AUTO: 39.4 % (ref 34–46.6)
HDLC SERPL-MCNC: 113 MG/DL
HGB BLD-MCNC: 13.7 G/DL (ref 11.1–15.9)
IMM GRANULOCYTES # BLD AUTO: 0 X10E3/UL (ref 0–0.1)
IMM GRANULOCYTES NFR BLD AUTO: 0 %
IMMATURE CELLS  115398: ABNORMAL
LABORATORY COMMENT REPORT: ABNORMAL
LDLC SERPL CALC-MCNC: 90 MG/DL (ref 0–99)
LYMPHOCYTES # BLD AUTO: 1.2 X10E3/UL (ref 0.7–3.1)
LYMPHOCYTES NFR BLD AUTO: 24 %
MCH RBC QN AUTO: 33.6 PG (ref 26.6–33)
MCHC RBC AUTO-ENTMCNC: 34.8 G/DL (ref 31.5–35.7)
MCV RBC AUTO: 97 FL (ref 79–97)
MONOCYTES # BLD AUTO: 0.4 X10E3/UL (ref 0.1–0.9)
MONOCYTES NFR BLD AUTO: 8 %
MORPHOLOGY BLD-IMP: ABNORMAL
NEUTROPHILS # BLD AUTO: 3.2 X10E3/UL (ref 1.4–7)
NEUTROPHILS NFR BLD AUTO: 65 %
NRBC BLD AUTO-RTO: ABNORMAL %
PLATELET # BLD AUTO: 228 X10E3/UL (ref 150–450)
POTASSIUM SERPL-SCNC: 4.1 MMOL/L (ref 3.5–5.2)
PROT SERPL-MCNC: 6.8 G/DL (ref 6–8.5)
RBC # BLD AUTO: 4.08 X10E6/UL (ref 3.77–5.28)
SODIUM SERPL-SCNC: 141 MMOL/L (ref 134–144)
TRIGL SERPL-MCNC: 58 MG/DL (ref 0–149)
TSH SERPL DL<=0.005 MIU/L-ACNC: 2.26 UIU/ML (ref 0.45–4.5)
VLDLC SERPL CALC-MCNC: 12 MG/DL (ref 5–40)
WBC # BLD AUTO: 4.8 X10E3/UL (ref 3.4–10.8)

## 2019-12-10 ENCOUNTER — APPOINTMENT (RX ONLY)
Dept: URBAN - METROPOLITAN AREA CLINIC 35 | Facility: CLINIC | Age: 59
Setting detail: DERMATOLOGY
End: 2019-12-10

## 2019-12-10 DIAGNOSIS — B35.1 TINEA UNGUIUM: ICD-10-CM

## 2019-12-10 DIAGNOSIS — D22 MELANOCYTIC NEVI: ICD-10-CM

## 2019-12-10 DIAGNOSIS — L65.9 NONSCARRING HAIR LOSS, UNSPECIFIED: ICD-10-CM

## 2019-12-10 DIAGNOSIS — Z71.89 OTHER SPECIFIED COUNSELING: ICD-10-CM

## 2019-12-10 DIAGNOSIS — L81.4 OTHER MELANIN HYPERPIGMENTATION: ICD-10-CM

## 2019-12-10 DIAGNOSIS — D18.0 HEMANGIOMA: ICD-10-CM

## 2019-12-10 DIAGNOSIS — L82.1 OTHER SEBORRHEIC KERATOSIS: ICD-10-CM

## 2019-12-10 DIAGNOSIS — L11.1 TRANSIENT ACANTHOLYTIC DERMATOSIS [GROVER]: ICD-10-CM

## 2019-12-10 PROBLEM — D18.01 HEMANGIOMA OF SKIN AND SUBCUTANEOUS TISSUE: Status: ACTIVE | Noted: 2019-12-10

## 2019-12-10 PROBLEM — D22.39 MELANOCYTIC NEVI OF OTHER PARTS OF FACE: Status: ACTIVE | Noted: 2019-12-10

## 2019-12-10 PROCEDURE — ? COUNSELING

## 2019-12-10 PROCEDURE — 99214 OFFICE O/P EST MOD 30 MIN: CPT

## 2019-12-10 PROCEDURE — ? TREATMENT REGIMEN

## 2019-12-10 PROCEDURE — ? OBSERVATION AND MEASURE

## 2019-12-10 ASSESSMENT — LOCATION DETAILED DESCRIPTION DERM
LOCATION DETAILED: LEFT LATERAL FRONTAL SCALP
LOCATION DETAILED: RIGHT PROXIMAL CALF
LOCATION DETAILED: RIGHT CENTRAL FRONTAL SCALP
LOCATION DETAILED: LEFT SUPERIOR CENTRAL MALAR CHEEK
LOCATION DETAILED: LEFT PROXIMAL PRETIBIAL REGION
LOCATION DETAILED: RIGHT DISTAL POSTERIOR UPPER ARM
LOCATION DETAILED: RIGHT RIB CAGE
LOCATION DETAILED: LEFT PROXIMAL DORSAL FOREARM
LOCATION DETAILED: LEFT PROXIMAL CALF
LOCATION DETAILED: RIGHT PROXIMAL PRETIBIAL REGION
LOCATION DETAILED: LEFT GREAT TOENAIL
LOCATION DETAILED: LEFT MID-UPPER BACK
LOCATION DETAILED: RIGHT LATERAL SUPERIOR CHEST

## 2019-12-10 ASSESSMENT — LOCATION SIMPLE DESCRIPTION DERM
LOCATION SIMPLE: RIGHT POSTERIOR UPPER ARM
LOCATION SIMPLE: ABDOMEN
LOCATION SIMPLE: LEFT SCALP
LOCATION SIMPLE: LEFT PRETIBIAL REGION
LOCATION SIMPLE: LEFT CHEEK
LOCATION SIMPLE: CHEST
LOCATION SIMPLE: LEFT FOREARM
LOCATION SIMPLE: LEFT CALF
LOCATION SIMPLE: RIGHT CALF
LOCATION SIMPLE: LEFT GREAT TOE
LOCATION SIMPLE: LEFT UPPER BACK
LOCATION SIMPLE: RIGHT PRETIBIAL REGION
LOCATION SIMPLE: RIGHT SCALP

## 2019-12-10 ASSESSMENT — LOCATION ZONE DERM
LOCATION ZONE: ARM
LOCATION ZONE: TOENAIL
LOCATION ZONE: FACE
LOCATION ZONE: LEG
LOCATION ZONE: SCALP
LOCATION ZONE: TRUNK

## 2019-12-10 NOTE — PROCEDURE: OBSERVATION
Detail Level: Detailed
Size Of Lesion: 3 mm pink domed papule
Morphology Per Location (Optional): Present for as long as patient can remember

## 2019-12-10 NOTE — PROCEDURE: TREATMENT REGIMEN
Detail Level: Zone
Continue Regimen: Ketoconazole 2% cream twice a day, massage to feet and toenails.
Continue Regimen: Triamcinolone 0.1% cream twice a day as needed

## 2020-01-02 ENCOUNTER — OFFICE VISIT (OUTPATIENT)
Dept: MEDICAL GROUP | Facility: PHYSICIAN GROUP | Age: 60
End: 2020-01-02
Payer: COMMERCIAL

## 2020-01-02 VITALS
HEART RATE: 80 BPM | OXYGEN SATURATION: 97 % | BODY MASS INDEX: 20.96 KG/M2 | TEMPERATURE: 98 F | DIASTOLIC BLOOD PRESSURE: 80 MMHG | SYSTOLIC BLOOD PRESSURE: 120 MMHG | RESPIRATION RATE: 16 BRPM | WEIGHT: 111 LBS | HEIGHT: 61 IN

## 2020-01-02 DIAGNOSIS — R10.31 RLQ ABDOMINAL PAIN: ICD-10-CM

## 2020-01-02 DIAGNOSIS — R10.9 FLANK PAIN: ICD-10-CM

## 2020-01-02 LAB
APPEARANCE UR: CLEAR
BILIRUB UR STRIP-MCNC: NORMAL MG/DL
COLOR UR AUTO: NORMAL
GLUCOSE UR STRIP.AUTO-MCNC: NORMAL MG/DL
KETONES UR STRIP.AUTO-MCNC: NORMAL MG/DL
LEUKOCYTE ESTERASE UR QL STRIP.AUTO: NORMAL
NITRITE UR QL STRIP.AUTO: NORMAL
PH UR STRIP.AUTO: 5.5 [PH] (ref 5–8)
PROT UR QL STRIP: NORMAL MG/DL
RBC UR QL AUTO: NORMAL
SP GR UR STRIP.AUTO: 1.03
UROBILINOGEN UR STRIP-MCNC: 0.2 MG/DL

## 2020-01-02 PROCEDURE — 99214 OFFICE O/P EST MOD 30 MIN: CPT | Performed by: NURSE PRACTITIONER

## 2020-01-02 PROCEDURE — 81002 URINALYSIS NONAUTO W/O SCOPE: CPT | Performed by: NURSE PRACTITIONER

## 2020-01-02 ASSESSMENT — PATIENT HEALTH QUESTIONNAIRE - PHQ9: CLINICAL INTERPRETATION OF PHQ2 SCORE: 0

## 2020-01-02 NOTE — PROGRESS NOTES
Chief Complaint   Patient presents with   • Flank Pain       HISTORY OF PRESENT ILLNESS: Patient is a 59 y.o. female established patient who presents today to discuss the following issues:    Flank pain  Has been having issues with intermittent right flank pain.  Denies urinary symptoms.  POCT  UA was negative.  She also reports some vague RLQ pain from time to time.  She would like to proceed with an ultrasound.        Patient Active Problem List    Diagnosis Date Noted   • Macrocytosis without anemia 03/28/2014     Priority: Medium   • Allergic symptoms 10/23/2014     Priority: Low     Class: Minor   • Flank pain 01/21/2020   • RLQ abdominal pain 01/21/2020   • Encounter for wellness examination 11/27/2019   • Need for vaccination 11/27/2019   • Shortness of breath 04/16/2019   • Family history of stroke 02/27/2018   • Family history of premature CAD 02/27/2018   • Midline low back pain without sciatica 06/24/2016   • Eczema 03/22/2016   • Herpes simplex type 1 infection 10/26/2015   • Osteopenia 03/24/2015   • Anxiety 03/24/2015   • Insomnia 03/24/2015       Allergies:Patient has no known allergies.    Current Outpatient Medications   Medication Sig Dispense Refill   • valACYclovir (VALTREX) 500 MG Tab Take 500 mg by mouth every day.  3   • Multiple Vitamin (MULTI-VITAMINS PO) Take  by mouth.     • Ascorbic Acid (CORNELIO-C PO) Take  by mouth.     • traZODone (DESYREL) 100 MG Tab Take 100 mg by mouth every evening.     • FIBER SELECT GUMMIES PO Take  by mouth.     • vitamin D (CHOLECALCIFEROL) 1000 UNIT TABS Take 2,000 Units by mouth every day.     • Calcium-Vitamin D (CALCIUM + D PO) Take  by mouth.       No current facility-administered medications for this visit.        Social History     Tobacco Use   • Smoking status: Former Smoker     Packs/day: 0.00     Years: 1.00     Pack years: 0.00     Types: Cigarettes   • Smokeless tobacco: Never Used   Substance Use Topics   • Alcohol use: Yes     Frequency: 4 or more  "times a week   • Drug use: No       Family Status   Relation Name Status   • Mo  Alive        cancer   • Fa  Other        unknown   • Sis  Alive   • Son  Alive   • Bro  Alive   • Sis  Alive   • Sis  Alive   • Son  Alive   • Phill  Alive     Family History   Problem Relation Age of Onset   • Cancer Mother         breast    • Diabetes Mother    • Heart Disease Mother 80        carotid disease and CEA   • Stroke Father    • Stroke Sister    • Genetic Disorder Son         autism        Review of Systems:   Constitutional: Negative for fever, chills, weight loss and malaise/fatigue.   HENT: Negative for ear pain, nosebleeds, congestion, sore throat and neck pain.    Eyes: Negative for blurred vision.   Respiratory: Negative for cough, sputum production, shortness of breath and wheezing.    Cardiovascular: Negative for chest pain, palpitations, orthopnea and leg swelling.   Gastrointestinal: Negative for heartburn, nausea, vomiting and abdominal pain. Positive for rlq and right flank pain.  Genitourinary: Negative for dysuria, urgency and frequency.   Musculoskeletal: Negative for myalgias, joint pain, and back pain.  Skin: Negative for rash and itching.   Neurological: Negative for dizziness, tingling, tremors, sensory change, focal weakness and headaches.   Endo/Heme/Allergies: Does not bruise/bleed easily.   Psychiatric/Behavioral: Negative for depression, suicidal ideas and memory loss.  The patient is not nervous/anxious and does not have insomnia.    All other systems reviewed and are negative except as in HPI.    Exam:  Blood Pressure 120/80   Pulse 80   Temperature 36.7 °C (98 °F)   Respiration 16   Height 1.549 m (5' 1\")   Weight 50.3 kg (111 lb)   Oxygen Saturation 97%   General:  Well nourished, well developed female in NAD  Head: Grossly normal.  Neck: Supple without JVD or bruit. Thyroid is not enlarged.  Pulmonary: Clear to ausculation. Normal effort. No rales, ronchi, or wheezing.  Cardiovascular: " Regular rate and rhythm without murmur.   Abdomen:  Soft, nontender, nondistended. No CVAT  Extremities: No clubbing, cyanosis, or edema.  Skin: Intact with no obvious rashes or lesions.  Neuro: Grossly intact.  Psych: Alert and oriented x 3.  Mood and affect appropriate.    Medical decision-making and discussion: Anisa is here today to discuss a few things.  A POCT UA was negative, an ultrasound was ordered, and she will follow-up here as needed.          Assessment/Plan:  1. Flank pain  POCT Urinalysis   2. RLQ abdominal pain  US-BLADDER    CANCELED: US-PELVIC TRANSABDOMINAL ONLY       Return if symptoms worsen or fail to improve.    Please note that this dictation was created using voice recognition software. I have made every reasonable attempt to correct obvious errors, but I expect that there are errors of grammar and possibly content that I did not discover before finalizing the note.

## 2020-01-21 PROBLEM — R10.9 FLANK PAIN: Status: ACTIVE | Noted: 2020-01-21

## 2020-01-21 PROBLEM — R10.31 RLQ ABDOMINAL PAIN: Status: ACTIVE | Noted: 2020-01-21

## 2020-01-21 NOTE — ASSESSMENT & PLAN NOTE
Has been having issues with intermittent right flank pain.  Denies urinary symptoms.  POCT  UA was negative.  She also reports some vague RLQ pain from time to time.  She would like to proceed with an ultrasound.

## 2020-05-13 ENCOUNTER — APPOINTMENT (RX ONLY)
Dept: URBAN - METROPOLITAN AREA CLINIC 35 | Facility: CLINIC | Age: 60
Setting detail: DERMATOLOGY
End: 2020-05-13

## 2020-05-13 DIAGNOSIS — B35.3 TINEA PEDIS: ICD-10-CM

## 2020-05-13 DIAGNOSIS — L57.0 ACTINIC KERATOSIS: ICD-10-CM

## 2020-05-13 DIAGNOSIS — L56.8 OTHER SPECIFIED ACUTE SKIN CHANGES DUE TO ULTRAVIOLET RADIATION: ICD-10-CM

## 2020-05-13 DIAGNOSIS — L91.8 OTHER HYPERTROPHIC DISORDERS OF THE SKIN: ICD-10-CM

## 2020-05-13 PROCEDURE — ? SKIN TAG REMOVAL (COSMETIC)

## 2020-05-13 PROCEDURE — ? PRESCRIPTION

## 2020-05-13 PROCEDURE — ? COUNSELING

## 2020-05-13 PROCEDURE — 17000 DESTRUCT PREMALG LESION: CPT

## 2020-05-13 PROCEDURE — 99213 OFFICE O/P EST LOW 20 MIN: CPT | Mod: 25

## 2020-05-13 PROCEDURE — ? LIQUID NITROGEN

## 2020-05-13 PROCEDURE — ? TREATMENT REGIMEN

## 2020-05-13 RX ORDER — KETOCONAZOLE 20 MG/G
CREAM TOPICAL TWICE A DAY
Qty: 1 | Refills: 11 | Status: ERX

## 2020-05-13 ASSESSMENT — LOCATION ZONE DERM
LOCATION ZONE: FACE
LOCATION ZONE: NECK
LOCATION ZONE: TRUNK

## 2020-05-13 ASSESSMENT — LOCATION SIMPLE DESCRIPTION DERM
LOCATION SIMPLE: LEFT CHEEK
LOCATION SIMPLE: LEFT ANTERIOR NECK
LOCATION SIMPLE: RIGHT ANTERIOR NECK
LOCATION SIMPLE: CHEST

## 2020-05-13 ASSESSMENT — LOCATION DETAILED DESCRIPTION DERM
LOCATION DETAILED: LEFT LATERAL MALAR CHEEK
LOCATION DETAILED: RIGHT CLAVICULAR NECK
LOCATION DETAILED: LEFT CLAVICULAR NECK
LOCATION DETAILED: LEFT INFERIOR LATERAL NECK
LOCATION DETAILED: MIDDLE STERNUM
LOCATION DETAILED: RIGHT INFERIOR LATERAL NECK

## 2020-05-13 NOTE — HPI: SKIN LESION
Is This A New Presentation, Or A Follow-Up?: Skin Lesions
What Type Of Note Output Would You Prefer (Optional)?: Bullet Format
How Severe Is Your Skin Lesion?: mild
Has Your Skin Lesion Been Treated?: not been treated
Additional History: In [place] 1 week ago, then noticed bumps 2 days later, thinks it maybe from the sun per patient

## 2020-05-13 NOTE — PROCEDURE: LIQUID NITROGEN

## 2020-05-13 NOTE — PROCEDURE: TREATMENT REGIMEN
Otc Regimen: Take Zyrtec prior to sun exposure \\nRecommend sunscreen with zinc and titanium
Initiate Treatment: Triamcinolone cream twice a day for 7 days, Never to face, groin or under arms. Patient has at home
Detail Level: Zone

## 2020-05-13 NOTE — PROCEDURE: SKIN TAG REMOVAL (COSMETIC)
Consent: Written consent obtained and the risks of skin tag removal was reviewed with the patient including but not limited to bleeding, pigmentary change, infection, pain, and remote possibility of scarring.
Detail Level: Detailed
Anesthesia Type: 0.5% lidocaine with 1:200,000 epinephrine and a 1:10 solution of 8.4% sodium bicarbonate
Price (Use Numbers Only, No Special Characters Or $): 125
Removed With: liquid nitrogen

## 2020-07-17 ENCOUNTER — HOSPITAL ENCOUNTER (OUTPATIENT)
Dept: RADIOLOGY | Facility: MEDICAL CENTER | Age: 60
End: 2020-07-17
Attending: NURSE PRACTITIONER
Payer: COMMERCIAL

## 2020-07-17 DIAGNOSIS — Z12.31 VISIT FOR SCREENING MAMMOGRAM: ICD-10-CM

## 2020-07-17 PROCEDURE — 77067 SCR MAMMO BI INCL CAD: CPT

## 2020-07-20 ENCOUNTER — TELEPHONE (OUTPATIENT)
Dept: MEDICAL GROUP | Facility: PHYSICIAN GROUP | Age: 60
End: 2020-07-20

## 2020-07-20 NOTE — TELEPHONE ENCOUNTER
----- Message from Leigha Murdock P.A.-C. sent at 7/20/2020  9:00 AM PDT -----  Recent mammogram is negative for obvious abnormalities, but she has dense breasts which can lower sensitivity of the test. Recommend routine screening in one year.  There is additional breast imaging, Sonocine, that can be performed in addition to the mammogram to further evaluate those with dense breasts. Unfortunately, most insurances may not cover this exam.  Please let me know if patient is interested in test and I can place an order.    Thank you,     Leigha Murdock PA-C

## 2020-09-17 ENCOUNTER — OFFICE VISIT (OUTPATIENT)
Dept: MEDICAL GROUP | Facility: PHYSICIAN GROUP | Age: 60
End: 2020-09-17
Payer: COMMERCIAL

## 2020-09-17 VITALS
HEART RATE: 67 BPM | DIASTOLIC BLOOD PRESSURE: 76 MMHG | HEIGHT: 61 IN | OXYGEN SATURATION: 98 % | WEIGHT: 110 LBS | SYSTOLIC BLOOD PRESSURE: 110 MMHG | TEMPERATURE: 98 F | BODY MASS INDEX: 20.77 KG/M2 | RESPIRATION RATE: 14 BRPM

## 2020-09-17 DIAGNOSIS — B00.9 HERPES SIMPLEX TYPE 1 INFECTION: Chronic | ICD-10-CM

## 2020-09-17 DIAGNOSIS — E55.9 VITAMIN D DEFICIENCY: ICD-10-CM

## 2020-09-17 DIAGNOSIS — Z76.89 ENCOUNTER TO ESTABLISH CARE WITH NEW DOCTOR: ICD-10-CM

## 2020-09-17 DIAGNOSIS — Z78.9 ALCOHOL USE: ICD-10-CM

## 2020-09-17 DIAGNOSIS — G47.00 INSOMNIA, UNSPECIFIED TYPE: Chronic | ICD-10-CM

## 2020-09-17 PROBLEM — R06.02 SHORTNESS OF BREATH: Status: RESOLVED | Noted: 2019-04-16 | Resolved: 2020-09-17

## 2020-09-17 PROBLEM — Z00.00 ENCOUNTER FOR WELLNESS EXAMINATION: Status: RESOLVED | Noted: 2019-11-27 | Resolved: 2020-09-17

## 2020-09-17 PROBLEM — R10.31 RLQ ABDOMINAL PAIN: Status: RESOLVED | Noted: 2020-01-21 | Resolved: 2020-09-17

## 2020-09-17 PROBLEM — Z23 NEED FOR VACCINATION: Status: RESOLVED | Noted: 2019-11-27 | Resolved: 2020-09-17

## 2020-09-17 PROBLEM — F10.90 ALCOHOL USE: Status: ACTIVE | Noted: 2020-09-17

## 2020-09-17 PROBLEM — R10.9 FLANK PAIN: Status: RESOLVED | Noted: 2020-01-21 | Resolved: 2020-09-17

## 2020-09-17 PROCEDURE — 99204 OFFICE O/P NEW MOD 45 MIN: CPT | Performed by: INTERNAL MEDICINE

## 2020-09-17 ASSESSMENT — FIBROSIS 4 INDEX: FIB4 SCORE: 1.36

## 2020-09-17 NOTE — ASSESSMENT & PLAN NOTE
Patient is here today to establish care.  She has not done any lab since 2019.  Currently the patient is asymptomatic.

## 2020-09-17 NOTE — ASSESSMENT & PLAN NOTE
This is intermittent condition.  The patient take Valtrex on a as needed basis.  Currently she is asymptomatic.

## 2020-09-17 NOTE — PROGRESS NOTES
CC: Establish care  Request lab work    HPI: Pt presents today to establish care.   Pt's medical history is notable for:    Insomnia  This is a chronic condition  Patient is currently taking trazodone as needed.  No side effect reported.    Herpes simplex type 1 infection  This is intermittent condition.  The patient take Valtrex on a as needed basis.  Currently she is asymptomatic.    Encounter to establish care with new doctor  Patient is here today to establish care.  She has not done any lab since 2019.  Currently the patient is asymptomatic.    Vitamin D deficiency  Chronic condition patient is due for lab work.    Alcohol use  Patient presents today to discuss option to assist her to quit drinking alcohol completely.  Patient said that she does not drink that much  Typically 1 to 2 glasses of wine per day.  Patient however wishes to quit completely.  No side effect or issue with alcohol use.  Patient reported that she had been to alcoholic Anonymous previously and that helped her significantly.  We also discussed about the use of Antabuse and its potential side effects.  The patient does not wish to use Antabuse at this time.  She had decided to go back to  for meetings  Also for the patient that at [facility] we also have addiction clinic that we can refer patient to.  She is not interested at this time.              REVIEW OF SYSTEMS:     Constitutional:  no fever / chills   Neurologic: no headaches, no numbness/tingling  Eyes: no changes in vision  ENT: no sore throat, no hearing loss  CV:  no chest pain, no palpitations  Pulmonary: no SOB, no cough    GI: no nausea / vomiting, no diarrhea, no constipation  :  no dysuria, no hematuria   Skin: no rash   Hematologic: no bleeding      Allergies: Patient has no known allergies.    Current Outpatient Medications Ordered in Epic   Medication Sig Dispense Refill   • valACYclovir (VALTREX) 500 MG Tab Take 500 mg by mouth every day.  3   • traZODone  (DESYREL) 100 MG Tab Take 100 mg by mouth every evening.     • vitamin D (CHOLECALCIFEROL) 1000 UNIT TABS Take 2,000 Units by mouth every day.     • Multiple Vitamin (MULTI-VITAMINS PO) Take  by mouth.     • Ascorbic Acid (CORNELIO-C PO) Take  by mouth.     • FIBER SELECT GUMMIES PO Take  by mouth.     • Calcium-Vitamin D (CALCIUM + D PO) Take  by mouth.       No current Epic-ordered facility-administered medications on file.        Past Medical History:   Diagnosis Date   • Anxiety 3/24/2015   • Contusion 10/26/2015   • Eczema 3/22/2016   • Herpes simplex type 1 infection 10/26/2015   • Hypertension    • Insomnia    • Osteopenia 3/24/2015        Past Surgical History:   Procedure Laterality Date   • PB ENLARGE BREAST WITH IMPLANT          Family History   Problem Relation Age of Onset   • Cancer Mother         breast    • Diabetes Mother    • Heart Disease Mother 80        carotid disease and CEA   • Stroke Father    • Stroke Sister    • Genetic Disorder Son         autism         Social History     Tobacco Use   Smoking Status Former Smoker   • Packs/day: 0.00   • Years: 1.00   • Pack years: 0.00   • Types: Cigarettes   Smokeless Tobacco Never Used          Social History     Substance and Sexual Activity   Alcohol Use Yes   • Frequency: 4 or more times a week        ---------------------------------------------------------------------    PHYSICAL EXAM:   Vitals:    09/17/20 0703   BP: 110/76   Pulse: 67   Resp: 14   Temp: 36.7 °C (98 °F)   SpO2: 98%     Body mass index is 20.78 kg/m².     Constitutional: no acute distress  Eyes: PERRL, EOMI  Neck: supple, no JVD  CV: heart RRR  Resp: normal effort, no wheezing or rales.  GI: abdomen soft, no obvious mass, no tenderness  Neuro: CN 2-12 grossly intact  Skin: no obvious rash noted  Psych: normal mood and affect  ---------------------------------------------------------------------    ASSESSMENT and PLAN:   1. Encounter to establish care with new doctor  Routine lab work  ordered patient is advised to call for the results after few days.  - HEMOGLOBIN A1C; Future  - ALANINE AMINO-TRANS; Future  - Basic Metabolic Panel; Future  - CBC WITH DIFFERENTIAL; Future  - Lipid Profile; Future  - TSH; Future    2. Vitamin D deficiency  Chronic condition advised the patient to get lab work done to check vitamin D level.    3. Herpes simplex type 1 infection  Chronic intermittent condition.  Currently she is asymptomatic.  Patient will continue to take Valtrex as needed    4. Insomnia, unspecified type  Chronic stable condition continue with trazodone.    5. Alcohol use  As above the patient declined Antabuse or referral to addiction clinic at [facility].  She would like to go back to alcoholic anonymous meetings          Return in about 1 year (around 9/17/2021).     PATIENT EDUCATION:  -If any problems should arise, patient was advised to contact our office or go to ER to be evaluated.  -Advised pt to follow a healthy diet and regular aerobic exercise regimen. Advised pt to avoid alcohol and tobacco use.    Please note that this dictation was created using voice recognition software. I have made every reasonable attempt to correct obvious errors, but it is possible there are errors of grammar and possibly content that I did not discover before finalizing the note.

## 2020-09-17 NOTE — ASSESSMENT & PLAN NOTE
Patient presents today to discuss option to assist her to quit drinking alcohol completely.  Patient said that she does not drink that much  Typically 1 to 2 glasses of wine per day.  Patient however wishes to quit completely.  No side effect or issue with alcohol use.  Patient reported that she had been to alcoholic Anonymous previously and that helped her significantly.  We also discussed about the use of Antabuse and its potential side effects.  The patient does not wish to use Antabuse at this time.  She had decided to go back to  for meetings  Also for the patient that at [facility] we also have addiction clinic that we can refer patient to.  She is not interested at this time.

## 2020-09-17 NOTE — ASSESSMENT & PLAN NOTE
This is a chronic condition  Patient is currently taking trazodone as needed.  No side effect reported.

## 2020-09-22 ENCOUNTER — TELEPHONE (OUTPATIENT)
Dept: MEDICAL GROUP | Facility: PHYSICIAN GROUP | Age: 60
End: 2020-09-22

## 2020-09-22 DIAGNOSIS — E87.0 HYPERNATREMIA: ICD-10-CM

## 2020-09-22 PROBLEM — E78.5 HYPERLIPIDEMIA: Status: ACTIVE | Noted: 2020-09-22

## 2020-09-22 LAB
25(OH)D3+25(OH)D2 SERPL-MCNC: 52.2 NG/ML (ref 30–100)
ALT SERPL-CCNC: 14 IU/L (ref 0–32)
BASOPHILS # BLD AUTO: 0 X10E3/UL (ref 0–0.2)
BASOPHILS NFR BLD AUTO: 1 %
BUN SERPL-MCNC: 12 MG/DL (ref 8–27)
BUN/CREAT SERPL: 19 (ref 12–28)
CALCIUM SERPL-MCNC: 9.7 MG/DL (ref 8.7–10.3)
CHLORIDE SERPL-SCNC: 106 MMOL/L (ref 96–106)
CHOLEST SERPL-MCNC: 236 MG/DL (ref 100–199)
CO2 SERPL-SCNC: 23 MMOL/L (ref 20–29)
CREAT SERPL-MCNC: 0.64 MG/DL (ref 0.57–1)
EOSINOPHIL # BLD AUTO: 0.1 X10E3/UL (ref 0–0.4)
EOSINOPHIL NFR BLD AUTO: 1 %
ERYTHROCYTE [DISTWIDTH] IN BLOOD BY AUTOMATED COUNT: 11.7 % (ref 11.7–15.4)
GLUCOSE SERPL-MCNC: 82 MG/DL (ref 65–99)
HBA1C MFR BLD: 5.3 % (ref 4.8–5.6)
HCT VFR BLD AUTO: 41.2 % (ref 34–46.6)
HDLC SERPL-MCNC: 119 MG/DL
HGB BLD-MCNC: 14.5 G/DL (ref 11.1–15.9)
IMM GRANULOCYTES # BLD AUTO: 0 X10E3/UL (ref 0–0.1)
IMM GRANULOCYTES NFR BLD AUTO: 0 %
IMMATURE CELLS  115398: ABNORMAL
LABORATORY COMMENT REPORT: ABNORMAL
LDLC SERPL CALC-MCNC: 98 MG/DL (ref 0–99)
LYMPHOCYTES # BLD AUTO: 1.2 X10E3/UL (ref 0.7–3.1)
LYMPHOCYTES NFR BLD AUTO: 20 %
MCH RBC QN AUTO: 34 PG (ref 26.6–33)
MCHC RBC AUTO-ENTMCNC: 35.2 G/DL (ref 31.5–35.7)
MCV RBC AUTO: 97 FL (ref 79–97)
MONOCYTES # BLD AUTO: 0.4 X10E3/UL (ref 0.1–0.9)
MONOCYTES NFR BLD AUTO: 6 %
MORPHOLOGY BLD-IMP: ABNORMAL
NEUTROPHILS # BLD AUTO: 4.5 X10E3/UL (ref 1.4–7)
NEUTROPHILS NFR BLD AUTO: 72 %
NRBC BLD AUTO-RTO: ABNORMAL %
PLATELET # BLD AUTO: 290 X10E3/UL (ref 150–450)
POTASSIUM SERPL-SCNC: 4.6 MMOL/L (ref 3.5–5.2)
RBC # BLD AUTO: 4.26 X10E6/UL (ref 3.77–5.28)
SODIUM SERPL-SCNC: 146 MMOL/L (ref 134–144)
TRIGL SERPL-MCNC: 118 MG/DL (ref 0–149)
TSH SERPL DL<=0.005 MIU/L-ACNC: 2.12 UIU/ML (ref 0.45–4.5)
VLDLC SERPL CALC-MCNC: 19 MG/DL (ref 5–40)
WBC # BLD AUTO: 6.2 X10E3/UL (ref 3.4–10.8)

## 2020-09-22 NOTE — TELEPHONE ENCOUNTER
----- Message from Shaq Mahoney M.D. sent at 9/22/2020 12:13 PM PDT -----    Please call patient : labs back    Sodium level slightly high. pls increase fluid intake. Pt to repeat nonfasting blood test in 7d    Cholesterol :  236                   [normal = below 200]  Please start/continue with low fat low cholesterol diet, continue to exercise regularly and maintain an ideal weight.

## 2020-12-03 ENCOUNTER — OFFICE VISIT (OUTPATIENT)
Dept: MEDICAL GROUP | Facility: PHYSICIAN GROUP | Age: 60
End: 2020-12-03
Payer: COMMERCIAL

## 2020-12-03 VITALS
RESPIRATION RATE: 16 BRPM | HEIGHT: 61 IN | DIASTOLIC BLOOD PRESSURE: 63 MMHG | BODY MASS INDEX: 21.07 KG/M2 | TEMPERATURE: 96.9 F | HEART RATE: 77 BPM | OXYGEN SATURATION: 96 % | SYSTOLIC BLOOD PRESSURE: 106 MMHG | WEIGHT: 111.6 LBS

## 2020-12-03 DIAGNOSIS — F51.01 PRIMARY INSOMNIA: Chronic | ICD-10-CM

## 2020-12-03 DIAGNOSIS — Z00.00 ENCOUNTER FOR PREVENTIVE CARE: ICD-10-CM

## 2020-12-03 DIAGNOSIS — E55.9 VITAMIN D DEFICIENCY: Chronic | ICD-10-CM

## 2020-12-03 DIAGNOSIS — E78.2 MIXED HYPERLIPIDEMIA: Chronic | ICD-10-CM

## 2020-12-03 DIAGNOSIS — Z11.59 NEED FOR HEPATITIS C SCREENING TEST: ICD-10-CM

## 2020-12-03 DIAGNOSIS — B00.9 HERPES SIMPLEX TYPE 1 INFECTION: Chronic | ICD-10-CM

## 2020-12-03 PROBLEM — E78.5 HYPERLIPIDEMIA: Chronic | Status: ACTIVE | Noted: 2020-09-22

## 2020-12-03 PROBLEM — Z76.89 ENCOUNTER TO ESTABLISH CARE WITH NEW DOCTOR: Chronic | Status: RESOLVED | Noted: 2020-09-17 | Resolved: 2020-12-03

## 2020-12-03 PROCEDURE — 99396 PREV VISIT EST AGE 40-64: CPT | Performed by: INTERNAL MEDICINE

## 2020-12-03 ASSESSMENT — FIBROSIS 4 INDEX: FIB4 SCORE: 1.14

## 2020-12-03 NOTE — ASSESSMENT & PLAN NOTE
This is a chronic condition.  The patient is presently taking trazodone as needed.  No significant side effects reported per

## 2020-12-03 NOTE — PROGRESS NOTES
CC: Patient is here for an annual preventive examination      HPI: This is a 60 y.o. pt.  Pt's medical history is notable for:     Encounter for preventive care  Patient presents today for annual preventive care.    Insomnia  This is a chronic condition.  The patient is presently taking trazodone as needed.  No significant side effects reported per    Herpes simplex type 1 infection  Patient reported history of herpes simplex infection.  She takes Valtrex daily.  No flareup noted.    Vitamin D deficiency  Chronic condition patient currently taking vitamin D supplementation.    Hyperlipidemia  This is a chronic condition patient currently on diet therapy.          REVIEW OF SYSTEMS:     Constitutional:  no fever / chills   Neurologic: no headaches, no numbness/tingling  Eyes: no changes in vision  ENT: no sore throat, no hearing loss  CV:  no chest pain, no palpitations  Pulmonary: no SOB, no cough    GI: no nausea / vomiting, no diarrhea, no constipation  :  no dysuria, no hematuria       Allergies: Patient has no known allergies.    Current Outpatient Medications Ordered in Epic   Medication Sig Dispense Refill   • valACYclovir (VALTREX) 500 MG Tab Take 500 mg by mouth every day.  3   • Multiple Vitamin (MULTI-VITAMINS PO) Take  by mouth.     • Ascorbic Acid (CORNELIO-C PO) Take  by mouth.     • traZODone (DESYREL) 100 MG Tab Take 100 mg by mouth every evening.     • FIBER SELECT GUMMIES PO Take  by mouth.     • Calcium-Vitamin D (CALCIUM + D PO) Take  by mouth.     • vitamin D (CHOLECALCIFEROL) 1000 UNIT TABS Take 2,000 Units by mouth every day.       No current [facility]-ordered facility-administered medications on file.        Past Medical History:   Diagnosis Date   • Anxiety 3/24/2015   • Contusion 10/26/2015   • Eczema 3/22/2016   • Herpes simplex type 1 infection 10/26/2015   • Hypertension    • Insomnia    • Osteopenia 3/24/2015        Past Surgical History:   Procedure Laterality Date   • PB ENLARGE BREAST WITH  IMPLANT          Family History   Problem Relation Age of Onset   • Cancer Mother         breast    • Diabetes Mother    • Heart Disease Mother 80        carotid disease and CEA   • Stroke Father    • Stroke Sister    • Genetic Disorder Son         autism         Social History     Tobacco Use   Smoking Status Former Smoker   • Packs/day: 0.00   • Years: 1.00   • Pack years: 0.00   • Types: Cigarettes   Smokeless Tobacco Never Used          Social History     Substance and Sexual Activity   Alcohol Use Yes   • Frequency: 4 or more times a week         ------------------------------------------------------------------------------     PHYSICAL EXAM:   Vitals:    12/03/20 1333   BP: 106/63   Pulse: 77   Resp: 16   Temp: 36.1 °C (96.9 °F)   SpO2: 96%      Body mass index is 21.09 kg/m².         Constitutional: no acute distress  Neck: supple, no JVD  CV: heart RRR  Resp: normal effort, no wheezing or rales.  GI: abdomen soft, no obvious mass, no tenderness  Neuro: CN 2-12 grossly intact    Results for MANUEL KWOK (MRN [id number]) as of 12/3/2020 13:49   Ref. Range 9/21/2020 04:54   Sodium Latest Ref Range: 134 - 144 mmol/L 146 (H)   Potassium Latest Ref Range: 3.5 - 5.2 mmol/L 4.6   Chloride Latest Ref Range: 96 - 106 mmol/L 106   Co2 Latest Ref Range: 20 - 29 mmol/L 23   Glucose Latest Ref Range: 65 - 99 mg/dL 82   Bun Latest Ref Range: 8 - 27 mg/dL 12   Creatinine Latest Ref Range: 0.57 - 1.00 mg/dL 0.64   GFR If  Latest Ref Range: >59 mL/min/1.73 111   GFR If Non  Latest Ref Range: >59 mL/min/1.73 96   Bun-Creatinine Ratio Latest Ref Range: 12 - 28  19   Calcium Latest Ref Range: 8.7 - 10.3 mg/dL 9.7   ALT(SGPT) Latest Ref Range: 0 - 32 IU/L 14   Glycohemoglobin Latest Ref Range: 4.8 - 5.6 % 5.3   Cholesterol,Tot Latest Ref Range: 100 - 199 mg/dL 236 (H)   Triglycerides Latest Ref Range: 0 - 149 mg/dL 118   HDL Latest Ref Range: >39 mg/dL 119   LDL Chol Calc ([facility]) Latest Ref  Range: 0 - 99 mg/dL 98   VLDL Cholesterol Calc Latest Ref Range: 5 - 40 mg/dL 19       -----------------------------------------------------------------------------    ASSESSMENT:   1. Need for hepatitis C screening test     2. Mixed hyperlipidemia  HEMOGLOBIN A1C    ALANINE AMINO-TRANS    Basic Metabolic Panel    Lipid Profile    TSH    MICROALBUMIN CREAT RATIO URINE   3. Primary insomnia     4. Vitamin D deficiency  VITAMIN D,25 HYDROXY   5. Encounter for preventive care     6. Herpes simplex type 1 infection             MEDICAL DECISION MAKING: TODAY'S DISCUSSION / STATUS / PLAN:    Recent lab test result discussed with the patient   Advised the patient regarding low-fat low-cholesterol low-carb diet.  Advised the patient continue with current medications  Recommend follow-up in 4 to 6 months with lab test to be done prior to the next appointment  Health maintenance  Patient is up-to-date regarding mammogram colonoscopy Pap smear and immunizations     Return in about 5 months (around 5/3/2021).       PATIENT EDUCATION:  -If any problems should arise, patient was advised to contact our office or go to ER to be evaluated.      Please note that this dictation was created using voice recognition software. I have made every reasonable attempt to correct obvious errors, but it is possible there are errors of grammar and possibly content that I did not discover before finalizing the note.

## 2020-12-03 NOTE — ASSESSMENT & PLAN NOTE
Patient reported history of herpes simplex infection.  She takes Valtrex daily.  No flareup noted.

## 2020-12-15 ENCOUNTER — APPOINTMENT (RX ONLY)
Dept: URBAN - METROPOLITAN AREA CLINIC 35 | Facility: CLINIC | Age: 60
Setting detail: DERMATOLOGY
End: 2020-12-15

## 2020-12-15 VITALS — SYSTOLIC BLOOD PRESSURE: 135 MMHG | DIASTOLIC BLOOD PRESSURE: 88 MMHG

## 2020-12-15 DIAGNOSIS — L11.1 TRANSIENT ACANTHOLYTIC DERMATOSIS [GROVER]: ICD-10-CM

## 2020-12-15 DIAGNOSIS — D22 MELANOCYTIC NEVI: ICD-10-CM

## 2020-12-15 DIAGNOSIS — D18.0 HEMANGIOMA: ICD-10-CM

## 2020-12-15 DIAGNOSIS — L82.1 OTHER SEBORRHEIC KERATOSIS: ICD-10-CM

## 2020-12-15 DIAGNOSIS — L81.4 OTHER MELANIN HYPERPIGMENTATION: ICD-10-CM

## 2020-12-15 DIAGNOSIS — B35.1 TINEA UNGUIUM: ICD-10-CM

## 2020-12-15 DIAGNOSIS — Z71.89 OTHER SPECIFIED COUNSELING: ICD-10-CM

## 2020-12-15 PROBLEM — D22.39 MELANOCYTIC NEVI OF OTHER PARTS OF FACE: Status: ACTIVE | Noted: 2020-12-15

## 2020-12-15 PROBLEM — D18.01 HEMANGIOMA OF SKIN AND SUBCUTANEOUS TISSUE: Status: ACTIVE | Noted: 2020-12-15

## 2020-12-15 PROCEDURE — ? ORDER TESTS

## 2020-12-15 PROCEDURE — 99214 OFFICE O/P EST MOD 30 MIN: CPT

## 2020-12-15 PROCEDURE — ? PRESCRIPTION

## 2020-12-15 PROCEDURE — ? TREATMENT REGIMEN

## 2020-12-15 PROCEDURE — ? COUNSELING

## 2020-12-15 PROCEDURE — ? OBSERVATION AND MEASURE

## 2020-12-15 RX ORDER — KETOCONAZOLE 20 MG/G
1 CREAM TOPICAL TWICE A DAY
Qty: 1 | Refills: 11 | Status: ERX

## 2020-12-15 ASSESSMENT — LOCATION DETAILED DESCRIPTION DERM
LOCATION DETAILED: RIGHT PROXIMAL PRETIBIAL REGION
LOCATION DETAILED: RIGHT RIB CAGE
LOCATION DETAILED: LEFT SUPERIOR CENTRAL MALAR CHEEK
LOCATION DETAILED: LEFT GREAT TOENAIL
LOCATION DETAILED: RIGHT LATERAL SUPERIOR CHEST

## 2020-12-15 ASSESSMENT — LOCATION SIMPLE DESCRIPTION DERM
LOCATION SIMPLE: CHEST
LOCATION SIMPLE: RIGHT PRETIBIAL REGION
LOCATION SIMPLE: LEFT CHEEK
LOCATION SIMPLE: ABDOMEN
LOCATION SIMPLE: LEFT GREAT TOE

## 2020-12-15 ASSESSMENT — LOCATION ZONE DERM
LOCATION ZONE: FACE
LOCATION ZONE: TOENAIL
LOCATION ZONE: TRUNK
LOCATION ZONE: LEG

## 2020-12-15 NOTE — PROCEDURE: ORDER TESTS
Expected Date Of Service: 12/15/2020
Bill For Surgical Tray: no
Performing Laboratory: [id number]
Billing Type: Third-Party Bill

## 2020-12-15 NOTE — PROCEDURE: COUNSELING
Detail Level: Generalized
Patient Specific Counseling (Will Not Stick From Patient To Patient): Does not want to continue oral lamisil
Detail Level: Detailed
Detail Level: Zone

## 2020-12-17 ENCOUNTER — RX ONLY (OUTPATIENT)
Age: 60
Setting detail: RX ONLY
End: 2020-12-17

## 2020-12-17 RX ORDER — TERBINAFINE HYDROCHLORIDE 250 MG/1
1 TABLET ORAL
Qty: 7 | Refills: 0 | Status: ERX | COMMUNITY
Start: 2020-12-16 | End: 2021-06-30

## 2021-01-03 ENCOUNTER — OFFICE VISIT (OUTPATIENT)
Dept: URGENT CARE | Facility: PHYSICIAN GROUP | Age: 61
End: 2021-01-03
Payer: COMMERCIAL

## 2021-01-03 ENCOUNTER — HOSPITAL ENCOUNTER (OUTPATIENT)
Dept: RADIOLOGY | Facility: MEDICAL CENTER | Age: 61
End: 2021-01-03
Attending: NURSE PRACTITIONER
Payer: COMMERCIAL

## 2021-01-03 VITALS
RESPIRATION RATE: 14 BRPM | DIASTOLIC BLOOD PRESSURE: 80 MMHG | SYSTOLIC BLOOD PRESSURE: 122 MMHG | BODY MASS INDEX: 20.96 KG/M2 | HEIGHT: 61 IN | TEMPERATURE: 98 F | WEIGHT: 111 LBS | HEART RATE: 90 BPM | OXYGEN SATURATION: 97 %

## 2021-01-03 DIAGNOSIS — S93.402A SPRAIN OF LEFT ANKLE, UNSPECIFIED LIGAMENT, INITIAL ENCOUNTER: ICD-10-CM

## 2021-01-03 DIAGNOSIS — M25.472 PAIN AND SWELLING OF ANKLE, LEFT: ICD-10-CM

## 2021-01-03 DIAGNOSIS — M25.572 PAIN AND SWELLING OF ANKLE, LEFT: ICD-10-CM

## 2021-01-03 PROCEDURE — 73610 X-RAY EXAM OF ANKLE: CPT | Mod: LT

## 2021-01-03 PROCEDURE — 99214 OFFICE O/P EST MOD 30 MIN: CPT | Performed by: NURSE PRACTITIONER

## 2021-01-03 ASSESSMENT — ENCOUNTER SYMPTOMS
CHILLS: 0
MYALGIAS: 1
FALLS: 0
NAUSEA: 0
VOMITING: 0
FEVER: 0
TINGLING: 1

## 2021-01-03 ASSESSMENT — PAIN SCALES - GENERAL: PAINLEVEL: 9=SEVERE PAIN

## 2021-01-03 ASSESSMENT — FIBROSIS 4 INDEX: FIB4 SCORE: 1.14

## 2021-01-03 NOTE — PROGRESS NOTES
Subjective:     Anisa Murillo is a 60 y.o. female who presents for Ankle Injury (L ankle injury x1day after falling with highheels)      Ankle Injury   Associated symptoms include tingling.     Pt presents for evaluation of a new problem.  Jessica is a pleasant 60-year-old female who presents to urgent care today with complaints of left lateral ankle pain.  She states that she was wearing high heel booties and stepped wrong experiencing immediate pain following this incident.  She has used 800 mg of ibuprofen, elevation and ice for relief.  This is provided relief.  She has worsening pain upon weightbearing from the carpet.  She does state that she has some numbness and tingling however, this is normal for her as she wears high heels frequently.  Her pain moderate and remains unchanged.  Negative for prior injury to this ankle. She is currently using crutches to prevent weight bearing on ankle.      Review of Systems   Constitutional: Negative for chills and fever.   Gastrointestinal: Negative for nausea and vomiting.   Musculoskeletal: Positive for joint pain and myalgias. Negative for falls.   Neurological: Positive for tingling.       PMH:   Past Medical History:   Diagnosis Date   • Anxiety 3/24/2015   • Contusion 10/26/2015   • Eczema 3/22/2016   • Herpes simplex type 1 infection 10/26/2015   • Hypertension    • Insomnia    • Osteopenia 3/24/2015     ALLERGIES: No Known Allergies  SURGHX:   Past Surgical History:   Procedure Laterality Date   • CT BREAST AUGMENTATION WITH IMPLANT       SOCHX:   Social History     Socioeconomic History   • Marital status: Single     Spouse name: Not on file   • Number of children: Not on file   • Years of education: Not on file   • Highest education level: Not on file   Occupational History   • Not on file   Social Needs   • Financial resource strain: Not on file   • Food insecurity     Worry: Not on file     Inability: Not on file   • Transportation needs     Medical: Not  "on file     Non-medical: Not on file   Tobacco Use   • Smoking status: Former Smoker     Packs/day: 0.00     Years: 1.00     Pack years: 0.00     Types: Cigarettes   • Smokeless tobacco: Never Used   Substance and Sexual Activity   • Alcohol use: Yes     Frequency: 4 or more times a week   • Drug use: No   • Sexual activity: Yes     Partners: Male     Birth control/protection: Post-Menopausal   Lifestyle   • Physical activity     Days per week: Not on file     Minutes per session: Not on file   • Stress: Not on file   Relationships   • Social connections     Talks on phone: Not on file     Gets together: Not on file     Attends Episcopal service: Not on file     Active member of club or organization: Not on file     Attends meetings of clubs or organizations: Not on file     Relationship status: Not on file   • Intimate partner violence     Fear of current or ex partner: Not on file     Emotionally abused: Not on file     Physically abused: Not on file     Forced sexual activity: Not on file   Other Topics Concern   • Not on file   Social History Narrative   • Not on file     FH:   Family History   Problem Relation Age of Onset   • Cancer Mother         breast    • Diabetes Mother    • Heart Disease Mother 80        carotid disease and CEA   • Stroke Father    • Stroke Sister    • Genetic Disorder Son         autism          Objective:   /80   Pulse 90   Temp 36.7 °C (98 °F) (Temporal)   Resp 14   Ht 1.549 m (5' 1\")   Wt 50.3 kg (111 lb)   SpO2 97%   BMI 20.97 kg/m²     Physical Exam  Vitals signs and nursing note reviewed.   Constitutional:       General: She is not in acute distress.     Appearance: Normal appearance. She is not ill-appearing.   HENT:      Head: Normocephalic and atraumatic.      Right Ear: External ear normal.      Left Ear: External ear normal.      Nose: No congestion or rhinorrhea.      Mouth/Throat:      Mouth: Mucous membranes are moist.   Eyes:      Extraocular Movements: " Extraocular movements intact.      Pupils: Pupils are equal, round, and reactive to light.   Neck:      Musculoskeletal: Normal range of motion and neck supple.   Cardiovascular:      Rate and Rhythm: Normal rate and regular rhythm.      Pulses: Normal pulses.      Heart sounds: Normal heart sounds.   Pulmonary:      Effort: Pulmonary effort is normal.      Breath sounds: Normal breath sounds.   Abdominal:      General: Abdomen is flat. Bowel sounds are normal.      Palpations: Abdomen is soft.   Musculoskeletal:      Left foot: Decreased range of motion. Normal capillary refill. Tenderness, bony tenderness and swelling present. No crepitus, deformity or laceration.      Comments: Negative for erythema or ecchymosis to left lateral malleolus.    Skin:     General: Skin is warm and dry.      Capillary Refill: Capillary refill takes less than 2 seconds.   Neurological:      General: No focal deficit present.      Mental Status: She is alert and oriented to person, place, and time. Mental status is at baseline.   Psychiatric:         Mood and Affect: Mood normal.         Behavior: Behavior normal.         Thought Content: Thought content normal.         Judgment: Judgment normal.       DX ankle left: No osseous abnormality  Assessment/Plan:   Assessment    1. Sprain of left ankle, unspecified ligament, initial encounter     2. Pain and swelling of ankle, left  DX-ANKLE 3+ VIEWS LEFT     Pt was encouraged to seek treatment back in urgent care for follow-up x-ray if pain remains persistent past 7 days or develops worsening pain/symptoms. Tylenol/Ibuprofen as needed for discomfort.  Pt was encouraged to rest, ice 20 minutes 3 times daily for 3 days, elevate at heart level and compress with Ace wrap. Ace wrap applied in clinic by MA. Gentle ROM exercises encouraged. Pt instructed to use Tylenol 500 mg every four hours or Ibuprofen 600 mg every six hours as needed for relief of pain.   Questions/concerns addressed.  AVS  handout given and reviewed with patient. Pt educated on red flags and when to seek treatment back in ER or UC.

## 2021-01-05 ENCOUNTER — APPOINTMENT (OUTPATIENT)
Dept: CARDIOLOGY | Facility: MEDICAL CENTER | Age: 61
End: 2021-01-05
Payer: COMMERCIAL

## 2021-02-22 ENCOUNTER — OFFICE VISIT (OUTPATIENT)
Dept: CARDIOLOGY | Facility: MEDICAL CENTER | Age: 61
End: 2021-02-22
Payer: COMMERCIAL

## 2021-02-22 VITALS
WEIGHT: 110 LBS | DIASTOLIC BLOOD PRESSURE: 76 MMHG | HEART RATE: 100 BPM | OXYGEN SATURATION: 96 % | BODY MASS INDEX: 20.77 KG/M2 | SYSTOLIC BLOOD PRESSURE: 118 MMHG | HEIGHT: 61 IN

## 2021-02-22 DIAGNOSIS — Z82.49 FAMILY HISTORY OF PREMATURE CAD: ICD-10-CM

## 2021-02-22 PROCEDURE — 99213 OFFICE O/P EST LOW 20 MIN: CPT | Performed by: INTERNAL MEDICINE

## 2021-02-22 RX ORDER — AMOXICILLIN 500 MG
CAPSULE ORAL 3 TIMES DAILY
COMMUNITY
End: 2023-05-09

## 2021-02-22 RX ORDER — IBUPROFEN 800 MG/1
TABLET ORAL
COMMUNITY
Start: 2021-01-12 | End: 2022-02-15

## 2021-02-22 RX ORDER — NEOMYCIN/POLYMYXIN B/PRAMOXINE 3.5-10K-1
750 CREAM (GRAM) TOPICAL DAILY
COMMUNITY
End: 2023-05-09

## 2021-02-22 RX ORDER — KETOCONAZOLE 20 MG/G
CREAM TOPICAL
COMMUNITY
Start: 2021-02-13 | End: 2023-11-06

## 2021-02-22 ASSESSMENT — ENCOUNTER SYMPTOMS
RESPIRATORY NEGATIVE: 1
CARDIOVASCULAR NEGATIVE: 1
GASTROINTESTINAL NEGATIVE: 1
NERVOUS/ANXIOUS: 1
NEUROLOGICAL NEGATIVE: 1
CONSTITUTIONAL NEGATIVE: 1
MUSCULOSKELETAL NEGATIVE: 1

## 2021-02-22 ASSESSMENT — FIBROSIS 4 INDEX: FIB4 SCORE: 1.14

## 2021-06-30 ENCOUNTER — APPOINTMENT (RX ONLY)
Dept: URBAN - METROPOLITAN AREA CLINIC 35 | Facility: CLINIC | Age: 61
Setting detail: DERMATOLOGY
End: 2021-06-30

## 2021-06-30 DIAGNOSIS — D18.0 HEMANGIOMA: ICD-10-CM

## 2021-06-30 DIAGNOSIS — L30.9 DERMATITIS, UNSPECIFIED: ICD-10-CM | Status: RESOLVED

## 2021-06-30 DIAGNOSIS — L81.4 OTHER MELANIN HYPERPIGMENTATION: ICD-10-CM

## 2021-06-30 DIAGNOSIS — Z71.89 OTHER SPECIFIED COUNSELING: ICD-10-CM

## 2021-06-30 DIAGNOSIS — L82.1 OTHER SEBORRHEIC KERATOSIS: ICD-10-CM

## 2021-06-30 DIAGNOSIS — L11.1 TRANSIENT ACANTHOLYTIC DERMATOSIS [GROVER]: ICD-10-CM

## 2021-06-30 DIAGNOSIS — D22 MELANOCYTIC NEVI: ICD-10-CM

## 2021-06-30 DIAGNOSIS — B35.1 TINEA UNGUIUM: ICD-10-CM

## 2021-06-30 PROBLEM — D18.01 HEMANGIOMA OF SKIN AND SUBCUTANEOUS TISSUE: Status: ACTIVE | Noted: 2021-06-30

## 2021-06-30 PROBLEM — D22.39 MELANOCYTIC NEVI OF OTHER PARTS OF FACE: Status: ACTIVE | Noted: 2021-06-30

## 2021-06-30 PROCEDURE — ? COUNSELING

## 2021-06-30 PROCEDURE — ? ADDITIONAL NOTES

## 2021-06-30 PROCEDURE — ? TREATMENT REGIMEN

## 2021-06-30 PROCEDURE — ? ORDER TESTS

## 2021-06-30 PROCEDURE — ? PRESCRIPTION

## 2021-06-30 PROCEDURE — ? SUNSCREEN RECOMMENDATIONS

## 2021-06-30 PROCEDURE — 99214 OFFICE O/P EST MOD 30 MIN: CPT

## 2021-06-30 PROCEDURE — ? OBSERVATION AND MEASURE

## 2021-06-30 RX ORDER — KETOCONAZOLE 20 MG/G
1 CREAM TOPICAL TWICE A DAY
Qty: 1 | Refills: 11 | Status: ERX

## 2021-06-30 ASSESSMENT — LOCATION SIMPLE DESCRIPTION DERM
LOCATION SIMPLE: RIGHT UPPER BACK
LOCATION SIMPLE: CHEST
LOCATION SIMPLE: LEFT CHEEK
LOCATION SIMPLE: LEFT UPPER BACK
LOCATION SIMPLE: LEFT GREAT TOE
LOCATION SIMPLE: ABDOMEN

## 2021-06-30 ASSESSMENT — LOCATION ZONE DERM
LOCATION ZONE: TOENAIL
LOCATION ZONE: FACE
LOCATION ZONE: TRUNK

## 2021-06-30 ASSESSMENT — LOCATION DETAILED DESCRIPTION DERM
LOCATION DETAILED: LEFT GREAT TOENAIL
LOCATION DETAILED: RIGHT LATERAL SUPERIOR CHEST
LOCATION DETAILED: LEFT MEDIAL UPPER BACK
LOCATION DETAILED: RIGHT INFERIOR UPPER BACK
LOCATION DETAILED: RIGHT RIB CAGE
LOCATION DETAILED: LEFT SUPERIOR CENTRAL MALAR CHEEK

## 2021-06-30 NOTE — PROCEDURE: TREATMENT REGIMEN
Detail Level: Zone
Continue Regimen: Ketoconazole 2% cream twice a day, massage to feet and toenails.
Plan: After labs are reviewed plan, Terbinafine Rx
Continue Regimen: Triamcinolone 0.1% cream twice a day as needed

## 2021-06-30 NOTE — PROCEDURE: ADDITIONAL NOTES
Render Risk Assessment In Note?: yes
Detail Level: Zone
Additional Notes: Discussed elective cosmetic treatment with liquid nitrogen, quoted $200
Additional Notes: Advised patient to come in when flaring and take photos.
Additional Notes: Vinegar soaks, 2 parts water 1 part vinegar

## 2021-06-30 NOTE — PROCEDURE: ORDER TESTS
Expected Date Of Service: 12/15/2020
Bill For Surgical Tray: no
Performing Laboratory: [id number]
Billing Type: Third-Party Bill

## 2021-06-30 NOTE — PROCEDURE: COUNSELING
Detail Level: Generalized
Patient Specific Counseling (Will Not Stick From Patient To Patient): Does not want to continue oral lamisil
Detail Level: Detailed
Detail Level: Zone
Detail Level: Simple

## 2021-07-13 ENCOUNTER — RX ONLY (OUTPATIENT)
Age: 61
Setting detail: RX ONLY
End: 2021-07-13

## 2021-07-13 RX ORDER — TERBINAFINE HYDROCHLORIDE 250 MG/1
1 TABLET ORAL DAILY
Qty: 30 | Refills: 0 | Status: ERX | COMMUNITY
Start: 2021-07-13

## 2021-07-20 ENCOUNTER — HOSPITAL ENCOUNTER (OUTPATIENT)
Dept: RADIOLOGY | Facility: MEDICAL CENTER | Age: 61
End: 2021-07-20
Attending: OBSTETRICS & GYNECOLOGY
Payer: COMMERCIAL

## 2021-07-20 DIAGNOSIS — Z12.31 ENCOUNTER FOR MAMMOGRAM TO ESTABLISH BASELINE MAMMOGRAM: ICD-10-CM

## 2021-07-20 PROCEDURE — 77063 BREAST TOMOSYNTHESIS BI: CPT

## 2021-08-13 ENCOUNTER — HOSPITAL ENCOUNTER (OUTPATIENT)
Dept: LAB | Facility: MEDICAL CENTER | Age: 61
End: 2021-08-13
Attending: DERMATOLOGY
Payer: COMMERCIAL

## 2021-08-13 LAB
ALBUMIN SERPL BCP-MCNC: 4.4 G/DL (ref 3.2–4.9)
ALP SERPL-CCNC: 49 U/L (ref 30–99)
ALT SERPL-CCNC: 15 U/L (ref 2–50)
AST SERPL-CCNC: 14 U/L (ref 12–45)
BILIRUB CONJ SERPL-MCNC: <0.2 MG/DL (ref 0.1–0.5)
BILIRUB INDIRECT SERPL-MCNC: NORMAL MG/DL (ref 0–1)
BILIRUB SERPL-MCNC: 0.2 MG/DL (ref 0.1–1.5)
PROT SERPL-MCNC: 7 G/DL (ref 6–8.2)

## 2021-08-13 PROCEDURE — 36415 COLL VENOUS BLD VENIPUNCTURE: CPT

## 2021-08-13 PROCEDURE — 80076 HEPATIC FUNCTION PANEL: CPT

## 2021-09-14 ENCOUNTER — OFFICE VISIT (OUTPATIENT)
Dept: CARDIOLOGY | Facility: MEDICAL CENTER | Age: 61
End: 2021-09-14
Payer: COMMERCIAL

## 2021-09-14 VITALS
WEIGHT: 110 LBS | HEART RATE: 89 BPM | OXYGEN SATURATION: 96 % | SYSTOLIC BLOOD PRESSURE: 124 MMHG | DIASTOLIC BLOOD PRESSURE: 78 MMHG | HEIGHT: 61 IN | BODY MASS INDEX: 20.77 KG/M2 | RESPIRATION RATE: 16 BRPM

## 2021-09-14 DIAGNOSIS — Z82.49 FAMILY HISTORY OF PREMATURE CAD: ICD-10-CM

## 2021-09-14 DIAGNOSIS — F41.9 ANXIETY: Chronic | ICD-10-CM

## 2021-09-14 LAB — EKG IMPRESSION: NORMAL

## 2021-09-14 PROCEDURE — 99213 OFFICE O/P EST LOW 20 MIN: CPT | Performed by: INTERNAL MEDICINE

## 2021-09-14 PROCEDURE — 93000 ELECTROCARDIOGRAM COMPLETE: CPT | Performed by: INTERNAL MEDICINE

## 2021-09-14 RX ORDER — TERBINAFINE HYDROCHLORIDE 250 MG/1
TABLET ORAL
COMMUNITY
Start: 2021-09-08 | End: 2022-02-15

## 2021-09-14 ASSESSMENT — ENCOUNTER SYMPTOMS
PARESTHESIAS: 0
NUMBNESS: 0
IRREGULAR HEARTBEAT: 0
DIZZINESS: 0
SLEEP DISTURBANCES DUE TO BREATHING: 0
NERVOUS/ANXIOUS: 1
NAUSEA: 0
DECREASED APPETITE: 0
ABDOMINAL PAIN: 0
DIAPHORESIS: 0
WEAKNESS: 0
ORTHOPNEA: 0
HEADACHES: 0
PALPITATIONS: 0
WHEEZING: 0
SYNCOPE: 0
VOMITING: 0
SHORTNESS OF BREATH: 0

## 2021-09-14 ASSESSMENT — FIBROSIS 4 INDEX: FIB4 SCORE: 0.79

## 2021-09-14 NOTE — PROGRESS NOTES
Cardiology Follow-up Consultation Note    Date of note:    9/14/2021    Primary Care Provider: Shaq Mahoney M.D.    Name:             Anisa Murillo   YOB: 1960  MRN:               [id number]    Chief Complaint   Patient presents with   • Coronary Artery Disease     F/V Dx: Family history of premature CAD   • Hyperlipidemia       HISTORY OF PRESENT ILLNESS  Ms. Anisa Murillo is a 61 y.o. female who returns to see us for follow-up of family history of premature CAD and hyperlipidemia.    Last clinic visit: 2/22/2021 with Dr. Oviedo.  Patient is new to me.    Interim History:  Since her last visit, patient reports doing well without any concerning cardiovascular symptoms.  Is not currently exercising but overall states that she is very active and walks a lot.    Endorses anxiety and is wondering if she can be started on sertraline.      Review of Systems   Constitutional: Negative for decreased appetite, diaphoresis and malaise/fatigue.   Cardiovascular: Negative for chest pain, irregular heartbeat, leg swelling, orthopnea, palpitations and syncope.   Respiratory: Negative for shortness of breath, sleep disturbances due to breathing and wheezing.    Gastrointestinal: Negative for abdominal pain, nausea and vomiting.   Neurological: Negative for dizziness, headaches, numbness, paresthesias and weakness.   Psychiatric/Behavioral: The patient is nervous/anxious.          Past Medical History:   Diagnosis Date   • Anxiety 3/24/2015   • Contusion 10/26/2015   • Eczema 3/22/2016   • Herpes simplex type 1 infection 10/26/2015   • Hypertension    • Insomnia    • Osteopenia 3/24/2015         Past Surgical History:   Procedure Laterality Date   • AL BREAST AUGMENTATION WITH IMPLANT           Current Outpatient Medications   Medication Sig Dispense Refill   • terbinafine (LAMISIL) 250 MG Tab      • ibuprofen (MOTRIN) 800 MG Tab TAKE 1 TABLET BY MOUTH 3 TIMES A DAY AS NEEDED FOR PAIN /INFLAMMATION     •  ketoconazole (NIZORAL) 2 % Cream MASSAGE TO CLEAN FEET AND IN BETWEEN TOES TWICE A DAY     • vitamin E 180 mg (400 units) Cap Take 360 mg by mouth every day.     • Multiple Vitamins-Minerals (OCUVITE PO) Take  by mouth every day.     • Omega-3 Fatty Acids (FISH OIL) 1200 MG Cap Take  by mouth 3 times a day.     • CRANBERRY PO Take  by mouth.     • Omega-3 Krill Oil 500 MG Cap Take 750 mg by mouth every day.     • valACYclovir (VALTREX) 500 MG Tab Take 500 mg by mouth 1 time a day as needed.  3   • Multiple Vitamin (MULTI-VITAMINS PO) Take  by mouth.     • Ascorbic Acid (CORNELIO-C PO) Take  by mouth.     • traZODone (DESYREL) 100 MG Tab Take 100 mg by mouth every evening.     • FIBER SELECT GUMMIES PO Take  by mouth.     • vitamin D (CHOLECALCIFEROL) 1000 UNIT TABS Take 2,000 Units by mouth every day.     • Calcium-Vitamin D (CALCIUM + D PO) Take  by mouth.       No current facility-administered medications for this visit.         No Known Allergies      Family History   Problem Relation Age of Onset   • Cancer Mother         breast    • Diabetes Mother    • Heart Disease Mother 80        carotid disease and CEA   • Stroke Father    • Stroke Sister    • Genetic Disorder Son         autism          Social History     Socioeconomic History   • Marital status: Single     Spouse name: Not on file   • Number of children: Not on file   • Years of education: Not on file   • Highest education level: Not on file   Occupational History   • Not on file   Tobacco Use   • Smoking status: Former Smoker     Packs/day: 0.00     Years: 1.00     Pack years: 0.00     Types: Cigarettes   • Smokeless tobacco: Never Used   Vaping Use   • Vaping Use: Never used   Substance and Sexual Activity   • Alcohol use: Yes     Alcohol/week: 3.6 oz     Types: 6 Standard drinks or equivalent per week     Comment: 2x a week   • Drug use: Not Currently     Comment: history of   • Sexual activity: Yes     Partners: Male     Birth control/protection:  "Post-Menopausal   Other Topics Concern   • Not on file   Social History Narrative   • Not on file     Social Determinants of Health     Financial Resource Strain:    • Difficulty of Paying Living Expenses:    Food Insecurity:    • Worried About Running Out of Food in the Last Year:    • Ran Out of Food in the Last Year:    Transportation Needs:    • Lack of Transportation (Medical):    • Lack of Transportation (Non-Medical):    Physical Activity:    • Days of Exercise per Week:    • Minutes of Exercise per Session:    Stress:    • Feeling of Stress :    Social Connections:    • Frequency of Communication with Friends and Family:    • Frequency of Social Gatherings with Friends and Family:    • Attends Buddhist Services:    • Active Member of Clubs or Organizations:    • Attends Club or Organization Meetings:    • Marital Status:    Intimate Partner Violence:    • Fear of Current or Ex-Partner:    • Emotionally Abused:    • Physically Abused:    • Sexually Abused:          Physical Exam:  Ambulatory Vitals  /78 (BP Location: Left arm, Patient Position: Sitting, BP Cuff Size: Adult)   Pulse 89   Resp 16   Ht 1.549 m (5' 1\")   Wt 49.9 kg (110 lb)   SpO2 96%    Oxygen Therapy:  Pulse Oximetry: 96 %  BP Readings from Last 4 Encounters:   09/14/21 124/78   02/22/21 118/76   01/03/21 122/80   12/03/20 106/63       Weight/BMI: Body mass index is 20.78 kg/m².  Wt Readings from Last 4 Encounters:   09/14/21 49.9 kg (110 lb)   02/22/21 49.9 kg (110 lb)   01/03/21 50.3 kg (111 lb)   12/03/20 50.6 kg (111 lb 9.6 oz)       GEN: Well developed, well nourished and in no acute distress.  HEART: no significant JVD, regular rate and rhythm, normal S1 and S2, no murmurs, no third heart sounds, normal cardiac palpation  LUNG: clear to auscultation bilaterally, no wheezing, no crackles, normal respiratory effort on room air  ABDOMEN: soft, non-tender, non-distended, normal bowel sounds throughout  EXTREMITIES: no peripheral " edema noted  VASCULAR: no significantly elevated jugular venous pressure, no carotid bruits noted, radial pulses 2+ and equal      Lab Data Review:  Lab Results   Component Value Date/Time    CHOLSTRLTOT 236 (H) 09/21/2020 04:54 AM    CHOLSTRLTOT 214 (H) 10/08/2013 07:40 AM    LDL 90 11/29/2019 04:09 AM    LDL 81 10/08/2013 07:40 AM     09/21/2020 04:54 AM     10/08/2013 07:40 AM    TRIGLYCERIDE 118 09/21/2020 04:54 AM    TRIGLYCERIDE 61 10/08/2013 07:40 AM       Lab Results   Component Value Date/Time    SODIUM 146 (H) 09/21/2020 04:54 AM    SODIUM 139 10/08/2013 07:40 AM    POTASSIUM 4.6 09/21/2020 04:54 AM    POTASSIUM 3.8 10/08/2013 07:40 AM    CHLORIDE 106 09/21/2020 04:54 AM    CHLORIDE 103 10/08/2013 07:40 AM    CO2 23 09/21/2020 04:54 AM    CO2 27 10/08/2013 07:40 AM    GLUCOSE 82 09/21/2020 04:54 AM    GLUCOSE 88 10/08/2013 07:40 AM    BUN 12 09/21/2020 04:54 AM    BUN 21 10/08/2013 07:40 AM    CREATININE 0.64 09/21/2020 04:54 AM    CREATININE 0.65 10/08/2013 07:40 AM    BUNCREATRAT 19 09/21/2020 04:54 AM     Lab Results   Component Value Date/Time    ALKPHOSPHAT 49 08/13/2021 07:17 AM    ASTSGOT 14 08/13/2021 07:17 AM    ALTSGPT 15 08/13/2021 07:17 AM    TBILIRUBIN 0.2 08/13/2021 07:17 AM      Lab Results   Component Value Date/Time    WBC 6.2 09/21/2020 04:54 AM    WBC 5.7 08/27/2013 07:38 AM     Lab Results   Component Value Date/Time    HBA1C 5.3 09/21/2020 04:54 AM    HBA1C 5.2 03/01/2018 04:12 PM       Cardiac Imaging and Procedures Review:    EKG dated 9/14/2021: My personal interpretation is sinus rhythm with QTc 458 ms    Stress Echo dated 3/15/2018:   CONCLUSIONS   Negative stress echocardiogram for ischemia with adequate stress achieved by   heart rate criteria.      Assessment & Plan     1. Family history of premature CAD     2. Anxiety  EKG       Patient is doing well from a cardiovascular perspective.  Coronary calcium score done in October 2019 was 0.  Hence, is not on a statin  medication besides family history of premature CAD in mother and sisters.  Does not wish to start statin unless needed.  Will repeat CT coronary calcium score at next year's visit.    Patient wishes to be started on sertraline for anxiety.  EKG obtained today shows normal QTc.  Okay to start sertraline which has been discussed with her.      All of patient's excellent questions were answered to the best of my knowledge and to her satisfaction.  It was a pleasure seeing Ms. Anisa Murillo in my clinic today.  RTC in 1 year.  Patient is aware to call the cardiology clinic with any questions or concerns.      Jed Virgen MD  [facility]  [facility], [address]  [address] [address]  Phone: [phone]  Fax: [phone]    Please note that this dictation was created using voice recognition software. I have made every reasonable attempt to correct obvious errors, but it is possible there are errors of grammar and possibly content that I did not discover before finalizing the note.

## 2021-09-15 ENCOUNTER — RX ONLY (OUTPATIENT)
Age: 61
Setting detail: RX ONLY
End: 2021-09-15

## 2021-09-15 RX ORDER — TERBINAFINE HYDROCHLORIDE 250 MG/1
1 TABLET ORAL DAILY
Qty: 30 | Refills: 1 | Status: ERX

## 2021-12-15 ENCOUNTER — APPOINTMENT (RX ONLY)
Dept: URBAN - METROPOLITAN AREA CLINIC 35 | Facility: CLINIC | Age: 61
Setting detail: DERMATOLOGY
End: 2021-12-15

## 2021-12-15 DIAGNOSIS — L81.4 OTHER MELANIN HYPERPIGMENTATION: ICD-10-CM

## 2021-12-15 DIAGNOSIS — L11.1 TRANSIENT ACANTHOLYTIC DERMATOSIS [GROVER]: ICD-10-CM

## 2021-12-15 DIAGNOSIS — Z71.89 OTHER SPECIFIED COUNSELING: ICD-10-CM

## 2021-12-15 DIAGNOSIS — B35.1 TINEA UNGUIUM: ICD-10-CM | Status: INADEQUATELY CONTROLLED

## 2021-12-15 DIAGNOSIS — D18.0 HEMANGIOMA: ICD-10-CM

## 2021-12-15 DIAGNOSIS — L30.4 ERYTHEMA INTERTRIGO: ICD-10-CM

## 2021-12-15 DIAGNOSIS — D22 MELANOCYTIC NEVI: ICD-10-CM

## 2021-12-15 DIAGNOSIS — L82.1 OTHER SEBORRHEIC KERATOSIS: ICD-10-CM

## 2021-12-15 DIAGNOSIS — B00.1 HERPESVIRAL VESICULAR DERMATITIS: ICD-10-CM

## 2021-12-15 PROBLEM — D22.39 MELANOCYTIC NEVI OF OTHER PARTS OF FACE: Status: ACTIVE | Noted: 2021-12-15

## 2021-12-15 PROBLEM — D18.01 HEMANGIOMA OF SKIN AND SUBCUTANEOUS TISSUE: Status: ACTIVE | Noted: 2021-12-15

## 2021-12-15 PROCEDURE — ? COUNSELING

## 2021-12-15 PROCEDURE — ? LIQUID NITROGEN (COSMETIC)

## 2021-12-15 PROCEDURE — ? TREATMENT REGIMEN

## 2021-12-15 PROCEDURE — 99214 OFFICE O/P EST MOD 30 MIN: CPT

## 2021-12-15 PROCEDURE — ? ADDITIONAL NOTES

## 2021-12-15 PROCEDURE — ? SUNSCREEN RECOMMENDATIONS

## 2021-12-15 PROCEDURE — ? OBSERVATION AND MEASURE

## 2021-12-15 PROCEDURE — ? PRESCRIPTION

## 2021-12-15 PROCEDURE — ? ORDER TESTS

## 2021-12-15 RX ORDER — KETOCONAZOLE 20 MG/G
1 CREAM TOPICAL TWICE A DAY
Qty: 60 | Refills: 11 | Status: ERX

## 2021-12-15 RX ORDER — DICLOFENAC SODIUM 10 MG/G
1 GEL TOPICAL BID
Qty: 150 | Refills: 6 | Status: ERX | COMMUNITY
Start: 2021-12-15

## 2021-12-15 RX ADMIN — DICLOFENAC SODIUM 1: 10 GEL TOPICAL at 00:00

## 2021-12-15 ASSESSMENT — LOCATION DETAILED DESCRIPTION DERM
LOCATION DETAILED: LEFT MEDIAL SUPERIOR CHEST
LOCATION DETAILED: LEFT INFERIOR UPPER BACK
LOCATION DETAILED: RIGHT INFRAMAMMARY CREASE (INNER QUADRANT)
LOCATION DETAILED: RIGHT SUPERIOR ANTERIOR NECK
LOCATION DETAILED: LEFT SUPERIOR CENTRAL MALAR CHEEK
LOCATION DETAILED: RIGHT INFERIOR LATERAL NECK
LOCATION DETAILED: LEFT GREAT TOENAIL
LOCATION DETAILED: RIGHT INFERIOR LATERAL FOREHEAD
LOCATION DETAILED: LEFT MID-UPPER BACK
LOCATION DETAILED: RIGHT SUPERIOR MEDIAL MIDBACK
LOCATION DETAILED: LEFT RIB CAGE
LOCATION DETAILED: LEFT MEDIAL UPPER BACK
LOCATION DETAILED: LEFT INFERIOR LATERAL LOWER BACK
LOCATION DETAILED: RIGHT LATERAL SUPERIOR CHEST
LOCATION DETAILED: PERIANAL SKIN
LOCATION DETAILED: RIGHT MEDIAL SUPERIOR CHEST
LOCATION DETAILED: LEFT INFERIOR LATERAL NECK
LOCATION DETAILED: LEFT BUTTOCK
LOCATION DETAILED: RIGHT INFERIOR UPPER BACK
LOCATION DETAILED: UPPER STERNUM
LOCATION DETAILED: RIGHT CLAVICULAR SKIN
LOCATION DETAILED: RIGHT INFERIOR ANTERIOR NECK
LOCATION DETAILED: RIGHT SUPERIOR MEDIAL LOWER BACK
LOCATION DETAILED: LEFT SUPERIOR MEDIAL MIDBACK
LOCATION DETAILED: LEFT INFERIOR ANTERIOR NECK
LOCATION DETAILED: LEFT CLAVICULAR NECK
LOCATION DETAILED: RIGHT RIB CAGE
LOCATION DETAILED: EPIGASTRIC SKIN
LOCATION DETAILED: LEFT SUPERIOR LATERAL NECK
LOCATION DETAILED: LEFT LATERAL SUPERIOR CHEST

## 2021-12-15 ASSESSMENT — LOCATION ZONE DERM
LOCATION ZONE: NECK
LOCATION ZONE: ANUS
LOCATION ZONE: TRUNK
LOCATION ZONE: TOENAIL
LOCATION ZONE: FACE

## 2021-12-15 ASSESSMENT — LOCATION SIMPLE DESCRIPTION DERM
LOCATION SIMPLE: PERIANAL SKIN
LOCATION SIMPLE: RIGHT LOWER BACK
LOCATION SIMPLE: ABDOMEN
LOCATION SIMPLE: LEFT CHEEK
LOCATION SIMPLE: LEFT UPPER BACK
LOCATION SIMPLE: RIGHT FOREHEAD
LOCATION SIMPLE: RIGHT CLAVICULAR SKIN
LOCATION SIMPLE: LEFT LOWER BACK
LOCATION SIMPLE: RIGHT BREAST
LOCATION SIMPLE: RIGHT ANTERIOR NECK
LOCATION SIMPLE: RIGHT UPPER BACK
LOCATION SIMPLE: CHEST
LOCATION SIMPLE: LEFT ANTERIOR NECK
LOCATION SIMPLE: LEFT GREAT TOE
LOCATION SIMPLE: LEFT BUTTOCK

## 2021-12-15 NOTE — PROCEDURE: LIQUID NITROGEN (COSMETIC)
Detail Level: Detailed
Price (Use Numbers Only, No Special Characters Or $): 200.00
Consent: The patient's consent was obtained including but not limited to risks of crusting, scabbing, blistering, scarring, darker or lighter pigmentary change, recurrence, incomplete removal and infection. The patient understands that the procedure is cosmetic in nature and is not covered by insurance.
Render Post-Care Instructions In Note?: no
Post-Care Instructions: I reviewed with the patient in detail post-care instructions. Patient is to wear sunprotection, and avoid picking at any of the treated lesions. Pt may apply Vaseline to crusted or scabbing areas.
Billing Information: Bill by Static Price

## 2021-12-15 NOTE — PROCEDURE: ADDITIONAL NOTES
Detail Level: Zone
Additional Notes: Vinegar soaks, 2 parts water 1 part vinegar
Render Risk Assessment In Note?: yes
Additional Notes: Discussed elective cosmetic treatment with liquid nitrogen, quoted $200

## 2021-12-15 NOTE — PROCEDURE: MIPS QUALITY
Quality 226: Preventive Care And Screening: Tobacco Use: Screening And Cessation Intervention: Patient screened for tobacco use and is an ex/non-smoker
Detail Level: Generalized
Quality 130: Documentation Of Current Medications In The Medical Record: Current Medications Documented
Quality 402: Tobacco Use And Help With Quitting Among Adolescents: Patient screened for tobacco and never smoked

## 2021-12-15 NOTE — PROCEDURE: COUNSELING
Patient Specific Counseling (Will Not Stick From Patient To Patient): Does not want to continue oral lamisil
Detail Level: Detailed
Detail Level: Zone
Detail Level: Generalized
Detail Level: Simple

## 2021-12-15 NOTE — PROCEDURE: ORDER TESTS
Expected Date Of Service: 12/15/2020
Bill For Surgical Tray: no
Performing Laboratory: [id number]
Billing Type: Third-Party Bill

## 2022-02-15 ENCOUNTER — OFFICE VISIT (OUTPATIENT)
Dept: MEDICAL GROUP | Facility: PHYSICIAN GROUP | Age: 62
End: 2022-02-15
Payer: COMMERCIAL

## 2022-02-15 VITALS
SYSTOLIC BLOOD PRESSURE: 130 MMHG | RESPIRATION RATE: 18 BRPM | WEIGHT: 109 LBS | BODY MASS INDEX: 20.58 KG/M2 | TEMPERATURE: 98.9 F | HEIGHT: 61 IN | DIASTOLIC BLOOD PRESSURE: 80 MMHG | HEART RATE: 71 BPM | OXYGEN SATURATION: 98 %

## 2022-02-15 DIAGNOSIS — Z00.00 WELL ADULT EXAM: ICD-10-CM

## 2022-02-15 DIAGNOSIS — F51.01 PRIMARY INSOMNIA: Chronic | ICD-10-CM

## 2022-02-15 DIAGNOSIS — E78.2 MIXED HYPERLIPIDEMIA: Chronic | ICD-10-CM

## 2022-02-15 DIAGNOSIS — Z12.11 COLON CANCER SCREENING: ICD-10-CM

## 2022-02-15 DIAGNOSIS — F41.9 ANXIETY: Chronic | ICD-10-CM

## 2022-02-15 DIAGNOSIS — E55.9 VITAMIN D DEFICIENCY: Chronic | ICD-10-CM

## 2022-02-15 PROCEDURE — 99396 PREV VISIT EST AGE 40-64: CPT | Performed by: INTERNAL MEDICINE

## 2022-02-15 RX ORDER — ERYTHROMYCIN 5 MG/G
OINTMENT OPHTHALMIC
COMMUNITY
Start: 2022-01-17 | End: 2022-02-15

## 2022-02-15 RX ORDER — CIPROFLOXACIN HYDROCHLORIDE 3.5 MG/ML
SOLUTION/ DROPS TOPICAL
COMMUNITY
Start: 2022-01-20 | End: 2022-02-15

## 2022-02-15 RX ORDER — DIAZEPAM 10 MG/1
TABLET ORAL
COMMUNITY
Start: 2022-01-25 | End: 2022-02-15

## 2022-02-15 ASSESSMENT — FIBROSIS 4 INDEX: FIB4 SCORE: 0.79

## 2022-02-15 ASSESSMENT — PATIENT HEALTH QUESTIONNAIRE - PHQ9: CLINICAL INTERPRETATION OF PHQ2 SCORE: 0

## 2022-02-16 NOTE — ASSESSMENT & PLAN NOTE
Chronic condition.  Patient presently taking vitamin D supplementation.  Lab tests ordered for follow-up.

## 2022-02-16 NOTE — PROGRESS NOTES
"PRIMARY CARE CLINIC VISIT  Chief Complaint   Patient presents with   • Annual Exam         History of Present Illness     Vitamin D deficiency  Chronic condition.  Patient presently taking vitamin D supplementation.  Lab tests ordered for follow-up.    Insomnia  Chronic and stable condition.  The patient is taking trazodone at bedtime as needed.  No significant side effects reported.    Hyperlipidemia  Patient is due for lab test.    Anxiety  Chronic condition.  The patient no longer taking alprazolam.  Patient reported that her condition is stable.      Current Outpatient Medications on File Prior to Visit   Medication Sig Dispense Refill   • diclofenac sodium (VOLTAREN) 1 % Gel APPLY TWICE DAILY AS NEEDED TO AFFECTED AREA     • ketoconazole (NIZORAL) 2 % Cream MASSAGE TO CLEAN FEET AND IN BETWEEN TOES TWICE A DAY     • vitamin E 180 mg (400 units) Cap Take 360 mg by mouth every day.     • Multiple Vitamins-Minerals (OCUVITE PO) Take  by mouth every day.     • Omega-3 Fatty Acids (FISH OIL) 1200 MG Cap Take  by mouth 3 times a day.     • Omega-3 Krill Oil 500 MG Cap Take 750 mg by mouth every day.     • valACYclovir (VALTREX) 500 MG Tab Take 500 mg by mouth 1 time a day as needed.  3   • Multiple Vitamin (MULTI-VITAMINS PO) Take  by mouth.     • Ascorbic Acid (CORNELIO-C PO) Take  by mouth.     • traZODone (DESYREL) 100 MG Tab Take 100 mg by mouth every evening.     • FIBER SELECT GUMMIES PO Take  by mouth.     • vitamin D (CHOLECALCIFEROL) 1000 UNIT TABS Take 2,000 Units by mouth every day.     • Calcium-Vitamin D (CALCIUM + D PO) Take  by mouth.       No current facility-administered medications on file prior to visit.        Allergies: Patient has no known allergies.    ROS  As per HPI above. All other systems reviewed and negative.      Past Medical, Social, and Family history reviewed and updated in EPIC     Objective     /80   Pulse 71   Temp 37.2 °C (98.9 °F) (Temporal)   Resp 18   Ht 1.549 m (5' 1\")  "  Wt 49.4 kg (109 lb)   SpO2 98%    Body mass index is 20.6 kg/m².    General: alert and oriented  Cardiovascular: regular rate and rhythm  Pulmonary: lungs : no wheezing   Gastrointestinal: BS present. No obvious mass noted          Assessment and Plan     1. Colon cancer screening    - Referral to GI for Colonoscopy    2. Well adult exam  Routine lab work ordered.  Advised the patient to call for the results after few days.  - HEP C VIRUS ANTIBODY; Future  - Lipid Profile; Future  - Basic Metabolic Panel; Future  - TSH; Future  - MICROALBUMIN CREAT RATIO URINE; Future  - VITAMIN D,25 HYDROXY; Future    3. Primary insomnia  Chronic condition.  Stable..  Continue trazodone as needed    4. Mixed hyperlipidemia  Lab tests ordered for follow-up.  Recommend low-fat low-cholesterol diet.    5. Anxiety  Chronic condition.  Stable.  Continue to monitor.      Commend follow-up in 6 months         Please note that this dictation was created using voice recognition software. I have made every reasonable attempt to correct obvious errors but there may be errors of grammar and content that I may have overlooked prior to finalization of this note.      Shaq Mahoney MD  Internal Medicine  [facility]

## 2022-02-16 NOTE — ASSESSMENT & PLAN NOTE
Chronic and stable condition.  The patient is taking trazodone at bedtime as needed.  No significant side effects reported.

## 2022-02-16 NOTE — ASSESSMENT & PLAN NOTE
Chronic condition.  The patient no longer taking alprazolam.  Patient reported that her condition is stable.

## 2022-02-21 RX ORDER — ATORVASTATIN CALCIUM 20 MG/1
20 TABLET, FILM COATED ORAL DAILY
Qty: 90 TABLET | Refills: 3 | Status: SHIPPED | OUTPATIENT
Start: 2022-02-21 | End: 2022-05-24 | Stop reason: SDUPTHER

## 2022-03-31 NOTE — PROGRESS NOTES
Chief Complaint   Patient presents with   • Hyperlipidemia       Subjective:   Anisa Murillo is a 60 y.o. female who presents today for follow-up of a family history of premature coronary disease.  She was seen initially in October 2019, and had a coronary artery calcification scan which revealed a score of 0.  Lab from September, 2020 reveals HDL of 119 LDL of 98 and total cholesterol of 236.  The patient is a non-smoker.  She has myriad questions regarding her heart and her cholesterol.  She is taking omega-3 products but states it helps her eyes.  There is no history of exertional induced chest pain, palpitations or edema.  She is concerned about taking statins.    Past Medical History:   Diagnosis Date   • Anxiety 3/24/2015   • Contusion 10/26/2015   • Eczema 3/22/2016   • Herpes simplex type 1 infection 10/26/2015   • Hypertension    • Insomnia    • Osteopenia 3/24/2015     Past Surgical History:   Procedure Laterality Date   • OR BREAST AUGMENTATION WITH IMPLANT       Family History   Problem Relation Age of Onset   • Cancer Mother         breast    • Diabetes Mother    • Heart Disease Mother 80        carotid disease and CEA   • Stroke Father    • Stroke Sister    • Genetic Disorder Son         autism      Social History     Socioeconomic History   • Marital status: Single     Spouse name: Not on file   • Number of children: Not on file   • Years of education: Not on file   • Highest education level: Not on file   Occupational History   • Not on file   Tobacco Use   • Smoking status: Former Smoker     Packs/day: 0.00     Years: 1.00     Pack years: 0.00     Types: Cigarettes   • Smokeless tobacco: Never Used   Substance and Sexual Activity   • Alcohol use: Yes   • Drug use: No   • Sexual activity: Yes     Partners: Male     Birth control/protection: Post-Menopausal   Other Topics Concern   • Not on file   Social History Narrative   • Not on file     Social Determinants of Health     Financial Resource  Strain:    • Difficulty of Paying Living Expenses:    Food Insecurity:    • Worried About Running Out of Food in the Last Year:    • Ran Out of Food in the Last Year:    Transportation Needs:    • Lack of Transportation (Medical):    • Lack of Transportation (Non-Medical):    Physical Activity:    • Days of Exercise per Week:    • Minutes of Exercise per Session:    Stress:    • Feeling of Stress :    Social Connections:    • Frequency of Communication with Friends and Family:    • Frequency of Social Gatherings with Friends and Family:    • Attends Congregational Services:    • Active Member of Clubs or Organizations:    • Attends Club or Organization Meetings:    • Marital Status:    Intimate Partner Violence:    • Fear of Current or Ex-Partner:    • Emotionally Abused:    • Physically Abused:    • Sexually Abused:      No Known Allergies  Outpatient Encounter Medications as of 2/22/2021   Medication Sig Dispense Refill   • ibuprofen (MOTRIN) 800 MG Tab TAKE 1 TABLET BY MOUTH 3 TIMES A DAY AS NEEDED FOR PAIN /INFLAMMATION     • ketoconazole (NIZORAL) 2 % Cream MASSAGE TO CLEAN FEET AND IN BETWEEN TOES TWICE A DAY     • vitamin E 180 mg (400 units) Cap Take 360 mg by mouth every day.     • Multiple Vitamins-Minerals (OCUVITE PO) Take  by mouth every day.     • Omega-3 Fatty Acids (FISH OIL) 1200 MG Cap Take  by mouth 3 times a day.     • CRANBERRY PO Take  by mouth.     • Omega-3 Krill Oil 500 MG Cap Take 750 mg by mouth every day.     • valACYclovir (VALTREX) 500 MG Tab Take 500 mg by mouth 1 time a day as needed.  3   • Multiple Vitamin (MULTI-VITAMINS PO) Take  by mouth.     • Ascorbic Acid (CORNELIO-C PO) Take  by mouth.     • traZODone (DESYREL) 100 MG Tab Take 100 mg by mouth every evening.     • FIBER SELECT GUMMIES PO Take  by mouth.     • vitamin D (CHOLECALCIFEROL) 1000 UNIT TABS Take 2,000 Units by mouth every day.     • Calcium-Vitamin D (CALCIUM + D PO) Take  by mouth.       No facility-administered encounter  "medications on file as of 2/22/2021.     Review of Systems   Constitutional: Negative.    HENT: Negative.    Respiratory: Negative.    Cardiovascular: Negative.    Gastrointestinal: Negative.    Musculoskeletal: Negative.    Skin: Negative.    Neurological: Negative.    Endo/Heme/Allergies: Negative.    Psychiatric/Behavioral: The patient is nervous/anxious.         Objective:   /76 (BP Location: Left arm, Patient Position: Sitting, BP Cuff Size: Adult)   Pulse 100   Ht 1.549 m (5' 1\")   Wt 49.9 kg (110 lb)   SpO2 96%   BMI 20.78 kg/m²     Physical Exam   Constitutional: She is oriented to person, place, and time. No distress.   HENT:   Head: Normocephalic and atraumatic.   Eyes: Pupils are equal, round, and reactive to light. No scleral icterus.   Neck: No JVD present.   Cardiovascular: Normal rate and regular rhythm.   No murmur heard.  Pulmonary/Chest: No respiratory distress. She has no wheezes.   Abdominal: Soft. She exhibits no distension. There is no abdominal tenderness.   Musculoskeletal:         General: No edema.   Neurological: She is alert and oriented to person, place, and time.   Skin: Skin is warm.   Psychiatric: Her mood appears anxious. Her speech is rapid and/or pressured.       Assessment:     1. Family history of premature CAD         Medical Decision Making:  Today's Assessment / Status / Plan:   Families are premature coronary disease: Patient's coronary calcification CT scan in 2019 revealed a score of 0 which puts her in a very low risk group.  She was strongly reassured that with a score of 0 she is unlikely to have any significant heart problems in the near future.  I think there is a component of anxiety in her concerns regarding her heart.  She had myriad questions which were which were answered to the best of my ability.  Recommend she repeat coronary calcification score in about 2 years.  Other issues were discussed as well including limitation of alcohol intake and " exercise.  She will return on an as-needed basis only   done

## 2022-04-08 NOTE — HPI: FULL BODY SKIN EXAMINATION
How Severe Are Your Spot(S)?: mild
What Type Of Note Output Would You Prefer (Optional)?: Bullet Format
What Is The Reason For Today's Visit?: Full Body Skin Examination with No Concerns
What Is The Reason For Today's Visit? (Being Monitored For X): concerning skin lesions on an annual basis
yes

## 2022-05-24 ENCOUNTER — OFFICE VISIT (OUTPATIENT)
Dept: MEDICAL GROUP | Facility: PHYSICIAN GROUP | Age: 62
End: 2022-05-24
Payer: COMMERCIAL

## 2022-05-24 VITALS
RESPIRATION RATE: 16 BRPM | BODY MASS INDEX: 20.58 KG/M2 | WEIGHT: 109 LBS | HEART RATE: 83 BPM | DIASTOLIC BLOOD PRESSURE: 64 MMHG | SYSTOLIC BLOOD PRESSURE: 110 MMHG | TEMPERATURE: 98.5 F | HEIGHT: 61 IN | OXYGEN SATURATION: 96 %

## 2022-05-24 DIAGNOSIS — Z11.59 NEED FOR HEPATITIS C SCREENING TEST: ICD-10-CM

## 2022-05-24 DIAGNOSIS — E78.5 DYSLIPIDEMIA: ICD-10-CM

## 2022-05-24 DIAGNOSIS — F51.01 PRIMARY INSOMNIA: Chronic | ICD-10-CM

## 2022-05-24 DIAGNOSIS — B00.9 HERPES SIMPLEX TYPE 1 INFECTION: Chronic | ICD-10-CM

## 2022-05-24 PROCEDURE — 99214 OFFICE O/P EST MOD 30 MIN: CPT | Performed by: INTERNAL MEDICINE

## 2022-05-24 RX ORDER — ATORVASTATIN CALCIUM 20 MG/1
20 TABLET, FILM COATED ORAL DAILY
Qty: 90 TABLET | Refills: 3 | Status: SHIPPED | OUTPATIENT
Start: 2022-05-24 | End: 2023-05-09 | Stop reason: SDUPTHER

## 2022-05-24 RX ORDER — TERBINAFINE HYDROCHLORIDE 250 MG/1
250 TABLET ORAL
COMMUNITY
Start: 2022-04-05 | End: 2022-05-24

## 2022-05-24 ASSESSMENT — FIBROSIS 4 INDEX: FIB4 SCORE: 0.8

## 2022-05-24 NOTE — ASSESSMENT & PLAN NOTE
Patient reported occasional flareup of herpes simplex.  Patient takes Valtrex as needed.  Currently she is asymptomatic.

## 2022-05-24 NOTE — PROGRESS NOTES
PRIMARY CARE CLINIC VISIT  Chief Complaint   Patient presents with   • Follow-Up     Follow-up hyperlipidemia condition    History of Present Illness     Dyslipidemia  Chronic condition.  The patient is being treated with atorvastatin.  Patient is due for lab test.  No significant side effects reported.    Herpes simplex type 1 infection  Patient reported occasional flareup of herpes simplex.  Patient takes Valtrex as needed.  Currently she is asymptomatic.    Insomnia  Chronic condition.  The patient take trazodone as needed.  Currently she is doing well.      Current Outpatient Medications on File Prior to Visit   Medication Sig Dispense Refill   • vitamin E 180 mg (400 units) Cap Take 360 mg by mouth every day.     • valACYclovir (VALTREX) 500 MG Tab Take 500 mg by mouth 1 time a day as needed.  3   • traZODone (DESYREL) 100 MG Tab Take 100 mg by mouth every evening.     • vitamin D (CHOLECALCIFEROL) 1000 UNIT TABS Take 2,000 Units by mouth every day.     • diclofenac sodium (VOLTAREN) 1 % Gel APPLY TWICE DAILY AS NEEDED TO AFFECTED AREA     • ketoconazole (NIZORAL) 2 % Cream MASSAGE TO CLEAN FEET AND IN BETWEEN TOES TWICE A DAY     • Multiple Vitamins-Minerals (OCUVITE PO) Take  by mouth every day.     • Omega-3 Fatty Acids (FISH OIL) 1200 MG Cap Take  by mouth 3 times a day.     • Omega-3 Krill Oil 500 MG Cap Take 750 mg by mouth every day.     • Multiple Vitamin (MULTI-VITAMINS PO) Take  by mouth.     • Ascorbic Acid (CORNELIO-C PO) Take  by mouth.     • FIBER SELECT GUMMIES PO Take  by mouth.     • Calcium-Vitamin D (CALCIUM + D PO) Take  by mouth.       No current facility-administered medications on file prior to visit.        Allergies: Patient has no known allergies.    ROS  As per HPI above. All other systems reviewed and negative.      Past Medical, Social, and Family history reviewed and updated in EPIC     Objective     /64 (BP Location: Left arm, Patient Position: Sitting, BP Cuff Size: Adult)    "Pulse 83   Temp 36.9 °C (98.5 °F) (Temporal)   Resp 16   Ht 1.549 m (5' 1\")   Wt 49.4 kg (109 lb)   SpO2 96%    Body mass index is 20.6 kg/m².    General: alert and oriented  Cardiovascular: regular rate and rhythm  Pulmonary: lungs : no wheezing   Gastrointestinal: BS present. No obvious mass noted          Assessment and Plan     1. Dyslipidemia  - Lipid Profile; Future  Chronic condition.  Lab tests ordered for follow-up.  Recommend the patient to continue with healthy diet and exercise.  2. Herpes simplex type 1 infection  Patient currently asymptomatic.  Continue to monitor.  Patient takes Valtrex as needed.  3. Primary insomnia  Stable condition.  The patient takes trazodone as needed.  No significant side effects reported.        Other orders  - atorvastatin (LIPITOR) 20 MG Tab; Take 1 Tablet by mouth every day.  Dispense: 90 Tablet; Refill: 3                       Please note that this dictation was created using voice recognition software. I have made every reasonable attempt to correct obvious errors but there may be errors of grammar and content that I may have overlooked prior to finalization of this note.      Shaq Mahoney MD  Internal Medicine  [facility]  "

## 2022-05-24 NOTE — ASSESSMENT & PLAN NOTE
Chronic condition.  The patient is being treated with atorvastatin.  Patient is due for lab test.  No significant side effects reported.

## 2022-06-29 ENCOUNTER — APPOINTMENT (RX ONLY)
Dept: URBAN - METROPOLITAN AREA CLINIC 35 | Facility: CLINIC | Age: 62
Setting detail: DERMATOLOGY
End: 2022-06-29

## 2022-06-29 DIAGNOSIS — L72.0 EPIDERMAL CYST: ICD-10-CM

## 2022-06-29 DIAGNOSIS — B35.1 TINEA UNGUIUM: ICD-10-CM | Status: IMPROVED

## 2022-06-29 PROCEDURE — ? ADDITIONAL NOTES

## 2022-06-29 PROCEDURE — ? TREATMENT REGIMEN

## 2022-06-29 PROCEDURE — ? COUNSELING

## 2022-06-29 PROCEDURE — 99214 OFFICE O/P EST MOD 30 MIN: CPT

## 2022-06-29 PROCEDURE — ? BENIGN DESTRUCTION COSMETIC

## 2022-06-29 PROCEDURE — ? PRESCRIPTION

## 2022-06-29 RX ORDER — KETOCONAZOLE 20 MG/G
1 CREAM TOPICAL TWICE A DAY
Qty: 60 | Refills: 11 | Status: ERX

## 2022-06-29 ASSESSMENT — LOCATION ZONE DERM
LOCATION ZONE: NECK
LOCATION ZONE: TOENAIL

## 2022-06-29 ASSESSMENT — LOCATION DETAILED DESCRIPTION DERM
LOCATION DETAILED: RIGHT CENTRAL LATERAL NECK
LOCATION DETAILED: LEFT GREAT TOENAIL

## 2022-06-29 ASSESSMENT — LOCATION SIMPLE DESCRIPTION DERM
LOCATION SIMPLE: LEFT GREAT TOE
LOCATION SIMPLE: NECK

## 2022-06-29 NOTE — PROCEDURE: COUNSELING
Patient Specific Counseling (Will Not Stick From Patient To Patient): Does not want to continue oral lamisil
Detail Level: Detailed

## 2022-06-29 NOTE — PROCEDURE: ADDITIONAL NOTES
Detail Level: Zone
Additional Notes: Vinegar soaks, 2 parts water 1 part vinegar
Render Risk Assessment In Note?: yes

## 2022-06-29 NOTE — PROCEDURE: TREATMENT REGIMEN
Detail Level: Zone
Continue Regimen: Ketoconazole 2% cream twice a day, massage to feet and toenails.
Plan: Pt took terbinafine daily for about 7 months prior to today and stopped about 1 month ago because she ran out

## 2022-06-29 NOTE — PROCEDURE: MIPS QUALITY
Quality 226: Preventive Care And Screening: Tobacco Use: Screening And Cessation Intervention: Patient screened for tobacco use and is an ex/non-smoker
Detail Level: Generalized
Quality 130: Documentation Of Current Medications In The Medical Record: Current Medications Documented
Quality 402: Tobacco Use And Help With Quitting Among Adolescents: Patient screened for tobacco and is an ex-smoker

## 2022-06-29 NOTE — PROCEDURE: BENIGN DESTRUCTION COSMETIC
Detail Level: Detailed
Consent: The patient's consent was obtained including but not limited to risks of crusting, scabbing, blistering, scarring, darker or lighter pigmentary change, recurrence, incomplete removal and infection.
Anesthesia Type: 1% Xylocaine with [id number] epinephrine and sodium bicarbonate
Post-Care Instructions: I reviewed with the patient in detail post-care instructions. Patient is to wear sunprotection, and avoid picking at any of the treated lesions. Pt may apply Vaseline to crusted or scabbing areas.
Price (Use Numbers Only, No Special Characters Or $): 0.00

## 2022-07-22 ENCOUNTER — HOSPITAL ENCOUNTER (OUTPATIENT)
Dept: RADIOLOGY | Facility: MEDICAL CENTER | Age: 62
End: 2022-07-22
Attending: OBSTETRICS & GYNECOLOGY
Payer: COMMERCIAL

## 2022-07-22 DIAGNOSIS — Z12.31 VISIT FOR SCREENING MAMMOGRAM: ICD-10-CM

## 2022-07-22 PROCEDURE — 77063 BREAST TOMOSYNTHESIS BI: CPT

## 2022-11-29 ENCOUNTER — OFFICE VISIT (OUTPATIENT)
Dept: MEDICAL GROUP | Facility: PHYSICIAN GROUP | Age: 62
End: 2022-11-29
Payer: COMMERCIAL

## 2022-11-29 VITALS
TEMPERATURE: 98.3 F | HEART RATE: 85 BPM | OXYGEN SATURATION: 96 % | WEIGHT: 110 LBS | RESPIRATION RATE: 16 BRPM | SYSTOLIC BLOOD PRESSURE: 124 MMHG | BODY MASS INDEX: 20.77 KG/M2 | HEIGHT: 61 IN | DIASTOLIC BLOOD PRESSURE: 76 MMHG

## 2022-11-29 DIAGNOSIS — Z12.11 COLON CANCER SCREENING: ICD-10-CM

## 2022-11-29 DIAGNOSIS — Z00.00 WELL ADULT EXAM: ICD-10-CM

## 2022-11-29 DIAGNOSIS — B00.9 HERPES SIMPLEX TYPE 1 INFECTION: Chronic | ICD-10-CM

## 2022-11-29 DIAGNOSIS — E55.9 VITAMIN D DEFICIENCY: Chronic | ICD-10-CM

## 2022-11-29 DIAGNOSIS — E78.5 DYSLIPIDEMIA: Chronic | ICD-10-CM

## 2022-11-29 DIAGNOSIS — F51.01 PRIMARY INSOMNIA: Chronic | ICD-10-CM

## 2022-11-29 PROCEDURE — 99214 OFFICE O/P EST MOD 30 MIN: CPT | Performed by: INTERNAL MEDICINE

## 2022-11-30 NOTE — PROGRESS NOTES
PRIMARY CARE CLINIC VISIT    Chief complaint:    Follow-up hyperlipidemia  Requests lab test  Medication review      History of Present Illness     Dyslipidemia  Chronic condition.  The patient is taking atorvastatin.  No significant side effects reported.  Patient is due for lab test.    Herpes simplex type 1 infection  This is a chronic and recurrent condition.  The patient takes Valtrex as needed.  Currently she is asymptomatic.    Insomnia  Chronic condition.  The patient takes trazodone as needed.  No significant side effects reported.    Vitamin D deficiency  This is a chronic condition.  Patient presently taking vitamin D supplementation.  Blood tests ordered for follow-up.    Current Outpatient Medications on File Prior to Visit   Medication Sig Dispense Refill    atorvastatin (LIPITOR) 20 MG Tab Take 1 Tablet by mouth every day. 90 Tablet 3    Ascorbic Acid (CORNELIO-C PO) Take  by mouth.      traZODone (DESYREL) 100 MG Tab Take 100 mg by mouth every evening.      vitamin D (CHOLECALCIFEROL) 1000 UNIT TABS Take 2,000 Units by mouth every day.      diclofenac sodium (VOLTAREN) 1 % Gel APPLY TWICE DAILY AS NEEDED TO AFFECTED AREA      ketoconazole (NIZORAL) 2 % Cream MASSAGE TO CLEAN FEET AND IN BETWEEN TOES TWICE A DAY      Omega-3 Fatty Acids (FISH OIL) 1200 MG Cap Take  by mouth 3 times a day.      Omega-3 Krill Oil 500 MG Cap Take 750 mg by mouth every day.      valACYclovir (VALTREX) 500 MG Tab Take 500 mg by mouth 1 time a day as needed.  3    Multiple Vitamin (MULTI-VITAMINS PO) Take  by mouth.      FIBER SELECT GUMMIES PO Take  by mouth.      Calcium-Vitamin D (CALCIUM + D PO) Take  by mouth.       No current facility-administered medications on file prior to visit.        Allergies: Patient has no known allergies.    ROS  As per HPI above. All other systems reviewed and negative.      Past Medical, Social, and Family history reviewed and updated in EPIC     Objective     /76 (BP Location: Left arm,  "Patient Position: Sitting, BP Cuff Size: Adult)   Pulse 85   Temp 36.8 °C (98.3 °F) (Temporal)   Resp 16   Ht 1.549 m (5' 1\")   Wt 49.9 kg (110 lb)   SpO2 96%    Body mass index is 20.78 kg/m².    General: alert in no apparent distress.  Cardiovascular: regular rate and rhythm  Pulmonary: lungs : no wheezing   Gastrointestinal: BS present. No obvious mass noted          Assessment and Plan     1. Dyslipidemia  Chronic stable condition.  Continue with atorvastatin 20 mg daily.  reCommend low-fat low-cholesterol diet.  Lab tests requested.  Advised the patient to follow-up after lab test done    2. Herpes simplex type 1 infection  Chronic recurrent condition.  Currently patient asymptomatic.  Continue to monitor.  Patient may take Valtrex as needed    3. Primary insomnia  Chronic stable condition.  The patient takes trazodone as needed.  No significant side effects reported.    4. Vitamin D deficiency  Chronic condition.  Lab tests requested.  Advised the patient to continue with vitamin D supplementation      5. Colon cancer screening  - Referral to GI for Colonoscopy    6. Well adult exam  - Lipid Profile; Future  - ALANINE AMINO-TRANS; Future  - Basic Metabolic Panel; Future  - VITAMIN D,25 HYDROXY (DEFICIENCY); Future  - TSH; Future        Recommend follow-up in 6 months                  Please note that this dictation was created using voice recognition software. I have made every reasonable attempt to correct obvious errors, but I expect that there are errors of grammar and possibly content that I did not discover before finalizing the note.    Shaq Mahoney MD  Internal Medicine  [facility]  "

## 2022-11-30 NOTE — ASSESSMENT & PLAN NOTE
This is a chronic condition.  Patient presently taking vitamin D supplementation.  Blood tests ordered for follow-up.

## 2022-11-30 NOTE — ASSESSMENT & PLAN NOTE
Chronic condition.  The patient is taking atorvastatin.  No significant side effects reported.  Patient is due for lab test.

## 2022-12-14 ENCOUNTER — APPOINTMENT (RX ONLY)
Dept: URBAN - METROPOLITAN AREA CLINIC 35 | Facility: CLINIC | Age: 62
Setting detail: DERMATOLOGY
End: 2022-12-14

## 2022-12-14 DIAGNOSIS — Z71.89 OTHER SPECIFIED COUNSELING: ICD-10-CM

## 2022-12-14 DIAGNOSIS — L81.4 OTHER MELANIN HYPERPIGMENTATION: ICD-10-CM

## 2022-12-14 DIAGNOSIS — D18.0 HEMANGIOMA: ICD-10-CM

## 2022-12-14 DIAGNOSIS — L30.4 ERYTHEMA INTERTRIGO: ICD-10-CM

## 2022-12-14 DIAGNOSIS — B00.1 HERPESVIRAL VESICULAR DERMATITIS: ICD-10-CM

## 2022-12-14 DIAGNOSIS — L82.1 OTHER SEBORRHEIC KERATOSIS: ICD-10-CM

## 2022-12-14 DIAGNOSIS — L11.1 TRANSIENT ACANTHOLYTIC DERMATOSIS [GROVER]: ICD-10-CM

## 2022-12-14 DIAGNOSIS — D22 MELANOCYTIC NEVI: ICD-10-CM

## 2022-12-14 DIAGNOSIS — B35.1 TINEA UNGUIUM: ICD-10-CM

## 2022-12-14 PROBLEM — D22.39 MELANOCYTIC NEVI OF OTHER PARTS OF FACE: Status: ACTIVE | Noted: 2022-12-14

## 2022-12-14 PROBLEM — D18.01 HEMANGIOMA OF SKIN AND SUBCUTANEOUS TISSUE: Status: ACTIVE | Noted: 2022-12-14

## 2022-12-14 PROCEDURE — ? PRESCRIPTION

## 2022-12-14 PROCEDURE — ? OBSERVATION AND MEASURE

## 2022-12-14 PROCEDURE — ? SUNSCREEN RECOMMENDATIONS

## 2022-12-14 PROCEDURE — ? TREATMENT REGIMEN

## 2022-12-14 PROCEDURE — ? COUNSELING

## 2022-12-14 PROCEDURE — 99214 OFFICE O/P EST MOD 30 MIN: CPT

## 2022-12-14 PROCEDURE — ? ADDITIONAL NOTES

## 2022-12-14 RX ORDER — DICLOFENAC SODIUM 10 MG/G
1 GEL TOPICAL BID
Qty: 150 | Refills: 6 | Status: ERX

## 2022-12-14 RX ORDER — KETOCONAZOLE 20 MG/ML
SHAMPOO, SUSPENSION TOPICAL
Qty: 120 | Refills: 11 | Status: ERX | COMMUNITY
Start: 2022-12-14

## 2022-12-14 RX ORDER — EFINACONAZOLE 100 MG/ML
SOLUTION TOPICAL
Qty: 4 | Refills: 3 | Status: ERX | COMMUNITY
Start: 2022-12-14

## 2022-12-14 RX ORDER — DESONIDE 0.5 MG/G
THIN LAYER OINTMENT TOPICAL
Qty: 15 | Refills: 3 | Status: ERX | COMMUNITY
Start: 2022-12-14

## 2022-12-14 RX ADMIN — KETOCONAZOLE 1: 20 SHAMPOO, SUSPENSION TOPICAL at 00:00

## 2022-12-14 RX ADMIN — EFINACONAZOLE 1: 100 SOLUTION TOPICAL at 00:00

## 2022-12-14 RX ADMIN — DESONIDE THIN LAYER: 0.5 OINTMENT TOPICAL at 00:00

## 2022-12-14 ASSESSMENT — LOCATION SIMPLE DESCRIPTION DERM
LOCATION SIMPLE: ABDOMEN
LOCATION SIMPLE: LEFT LOWER BACK
LOCATION SIMPLE: LEFT GREAT TOE
LOCATION SIMPLE: LEFT UPPER BACK
LOCATION SIMPLE: CHEST
LOCATION SIMPLE: RIGHT UPPER BACK
LOCATION SIMPLE: PERIANAL SKIN
LOCATION SIMPLE: LEFT BUTTOCK
LOCATION SIMPLE: LEFT CHEEK

## 2022-12-14 ASSESSMENT — LOCATION DETAILED DESCRIPTION DERM
LOCATION DETAILED: RIGHT RIB CAGE
LOCATION DETAILED: PERIANAL SKIN
LOCATION DETAILED: LEFT MEDIAL UPPER BACK
LOCATION DETAILED: LEFT SUPERIOR CENTRAL MALAR CHEEK
LOCATION DETAILED: LEFT BUTTOCK
LOCATION DETAILED: RIGHT LATERAL SUPERIOR CHEST
LOCATION DETAILED: LEFT INFERIOR LATERAL LOWER BACK
LOCATION DETAILED: RIGHT INFERIOR UPPER BACK
LOCATION DETAILED: LEFT GREAT TOENAIL

## 2022-12-14 ASSESSMENT — LOCATION ZONE DERM
LOCATION ZONE: TRUNK
LOCATION ZONE: ANUS
LOCATION ZONE: FACE
LOCATION ZONE: TOENAIL

## 2022-12-14 NOTE — PROCEDURE: COUNSELING
Detail Level: Generalized
Patient Specific Counseling (Will Not Stick From Patient To Patient): Does not want to continue oral lamisil
Detail Level: Detailed
Detail Level: Simple
Detail Level: Zone

## 2022-12-14 NOTE — PROCEDURE: TREATMENT REGIMEN
Detail Level: Zone
Plan: After labs are reviewed plan, Terbinafine Rx
Initiate Treatment: Apply jublia to toes once a day
Continue Regimen: Triamcinolone 0.1% cream twice a day as needed
Detail Level: Simple
Continue Regimen: Ketoconazole cream
Initiate Treatment: Ketoconazole wash, desonide ointment

## 2022-12-18 ENCOUNTER — OFFICE VISIT (OUTPATIENT)
Dept: URGENT CARE | Facility: PHYSICIAN GROUP | Age: 62
End: 2022-12-18
Payer: COMMERCIAL

## 2022-12-18 ENCOUNTER — HOSPITAL ENCOUNTER (OUTPATIENT)
Dept: RADIOLOGY | Facility: MEDICAL CENTER | Age: 62
End: 2022-12-18
Attending: STUDENT IN AN ORGANIZED HEALTH CARE EDUCATION/TRAINING PROGRAM
Payer: COMMERCIAL

## 2022-12-18 VITALS
SYSTOLIC BLOOD PRESSURE: 142 MMHG | DIASTOLIC BLOOD PRESSURE: 80 MMHG | HEART RATE: 88 BPM | RESPIRATION RATE: 18 BRPM | WEIGHT: 112 LBS | HEIGHT: 61 IN | TEMPERATURE: 98.1 F | BODY MASS INDEX: 21.14 KG/M2 | OXYGEN SATURATION: 97 %

## 2022-12-18 DIAGNOSIS — S92.354A CLOSED NONDISPLACED FRACTURE OF FIFTH METATARSAL BONE OF RIGHT FOOT, INITIAL ENCOUNTER: ICD-10-CM

## 2022-12-18 DIAGNOSIS — S92.344A CLOSED NONDISPLACED FRACTURE OF FOURTH METATARSAL BONE OF RIGHT FOOT, INITIAL ENCOUNTER: ICD-10-CM

## 2022-12-18 DIAGNOSIS — S99.921A INJURY OF RIGHT FOOT, INITIAL ENCOUNTER: ICD-10-CM

## 2022-12-18 PROCEDURE — 73630 X-RAY EXAM OF FOOT: CPT | Mod: RT

## 2022-12-18 PROCEDURE — 99214 OFFICE O/P EST MOD 30 MIN: CPT | Performed by: STUDENT IN AN ORGANIZED HEALTH CARE EDUCATION/TRAINING PROGRAM

## 2022-12-18 NOTE — PROGRESS NOTES
Subjective:   Anisa Murillo is a 62 y.o. female who presents for Foot Injury (Right foot injury and pain 12/16/22)      HPI:  Pleasant 62-year-old female presents urgent care with her  for a right foot injury that occurred 2 days ago.  She states that she was walking up the stairs and missed one of the steps causing her to hyper dorsiflex her foot.  She has been having pain over the dorsal aspect of her foot just before her fifth and fourth toes.  She reports that the pain is only there when she is bearing weight.  Denies any pain at rest.  She does report some mild tingling to her toes but her sensation is intact.  She does report past fracture in the same foot not requiring surgical repair.  She denies fever, chills, nausea, vomiting, numbness, burning, radiation of pain into the ankle or leg, chest pain, palpitations, lower leg swelling, leg pain, shortness of breath, dizziness, or headache.  She did not hit her head during her fall.  She did not lose consciousness.      Medications:    atorvastatin Tabs  CALCIUM + D PO  diclofenac sodium Gel  FIBER SELECT GUMMIES PO  Fish Oil Caps  ketoconazole Crea  MULTI-VITAMINS PO  Omega-3 Krill Oil Caps  traZODone Tabs  valACYclovir Tabs  CORNELIO-C PO  vitamin D Tabs    Allergies: Patient has no known allergies.    Problem List: Anisa Murillo does not have any pertinent problems on file.    Surgical History:  Past Surgical History:   Procedure Laterality Date    MS BREAST AUGMENTATION WITH IMPLANT         Past Social Hx: Anisa Murillo  reports that she has quit smoking. Her smoking use included cigarettes. She has never used smokeless tobacco. She reports current alcohol use of about 3.6 oz per week. She reports that she does not currently use drugs.     Past Family Hx:  Anisa Murillo family history includes Cancer in her mother; Diabetes in her mother; Genetic Disorder in her son; Heart Disease (age of onset: 80) in her mother; Stroke in her  "father and sister.     Problem list, medications, and allergies reviewed by myself today in Epic.     Objective:     BP (!) 142/80   Pulse 88   Temp 36.7 °C (98.1 °F)   Resp 18   Ht 1.549 m (5' 1\")   Wt 50.8 kg (112 lb)   SpO2 97%   BMI 21.16 kg/m²     Physical Exam  Vitals reviewed.   Constitutional:       General: She is not in acute distress.     Appearance: Normal appearance.   HENT:      Head: Normocephalic and atraumatic.   Eyes:      Pupils: Pupils are equal, round, and reactive to light.   Cardiovascular:      Rate and Rhythm: Normal rate and regular rhythm.      Pulses: Normal pulses.      Heart sounds: Normal heart sounds. No murmur heard.  Pulmonary:      Effort: Pulmonary effort is normal. No respiratory distress.      Breath sounds: Normal breath sounds. No stridor. No wheezing, rhonchi or rales.   Musculoskeletal:      Cervical back: Normal range of motion. No tenderness.        Feet:    Feet:      Comments: Moderate amount of swelling diffusely over the dorsal aspect of midfoot.  TTP present to the fourth and fifth metatarsal.  No deformity noted.  No laceration or break in the skin.  No damage to the nailbed.  Range of motion present in the toes and ankle.  She is able to bear weight but does have pain when doing so.  Cap refill less than 2 seconds.  Sensation intact.  2+ pedal pulse.  Skin:     General: Skin is warm and dry.   Neurological:      Mental Status: She is alert.       RADIOLOGY RESULTS   DX-FOOT-COMPLETE 3+ RIGHT    Result Date: 12/18/2022 12/18/2022 10:20 AM HISTORY/REASON FOR EXAM:  Right foot pain following trauma TECHNIQUE/EXAM DESCRIPTION AND NUMBER OF VIEWS: 3 views of the RIGHT foot. COMPARISON:  None. FINDINGS: BONE MINERALIZATION: Normal. JOINTS: Mild multifocal interphalangeal osteoarthrosis. No erosions. FRACTURE: Acute, comminuted, predominantly obliquely oriented fractures of the fourth and fifth metatarsal diaphyses. No intra-articular extension. DISLOCATION: None. " SOFT TISSUES: No mass. Soft tissue swelling.     Acute, comminuted, predominantly obliquely oriented fractures of the fourth and fifth metatarsal diaphyses. No intra-articular extension.           Assessment/Plan:     Diagnosis and associated orders:     1. Closed nondisplaced fracture of fourth metatarsal bone of right foot, initial encounter  DX-FOOT-COMPLETE 3+ RIGHT    Referral to Orthopedics      2. Closed nondisplaced fracture of fifth metatarsal bone of right foot, initial encounter  Referral to Orthopedics         Comments/MDM:     X-ray shows acute comminuted, predominantly obliquely oriented fractures of the fourth and fifth metatarsal diaphysis.  No intra-articular extension.  X-ray findings are consistent with physical exam.  Patient does have a hard walking boot that she did present with.  She was advised to use this at all times.  She was offered crutches because I do want her to be nonweightbearing.  She states that she has crutches at home and would like to use those.  She does state that her pain is manageable.  She was advised to use ibuprofen 800 mg as needed and Tylenol as well.  Urgent referral to orthopedics for further evaluation.  No signs of compartment syndrome.  She is neurovascularly intact.  Advised to ice 3-5 times per day for 20 minutes at a time.  Discussed RICE.  Elevate as often as possible at home above the level of her heart.  ED/return precautions were given.         Differential diagnosis, natural history, supportive care, and indications for immediate follow-up discussed.    Advised the patient to follow-up with the primary care physician for recheck, reevaluation, and consideration of further management.    Please note that this dictation was created using voice recognition software. I have made a reasonable attempt to correct obvious errors, but I expect that there are errors of grammar and possibly content that I did not discover before finalizing the note.    Electronically  signed by Ethan Bliss PA-C.

## 2022-12-21 ENCOUNTER — HOSPITAL ENCOUNTER (OUTPATIENT)
Dept: RADIOLOGY | Facility: MEDICAL CENTER | Age: 62
End: 2022-12-21
Attending: NURSE PRACTITIONER
Payer: COMMERCIAL

## 2022-12-21 DIAGNOSIS — M79.671 FOOT PAIN, RIGHT: ICD-10-CM

## 2022-12-21 DIAGNOSIS — S92.341A DISPLACED FRACTURE OF FOURTH METATARSAL BONE, RIGHT FOOT, INITIAL ENCOUNTER FOR CLOSED FRACTURE: ICD-10-CM

## 2022-12-21 DIAGNOSIS — S92.351A DISPLACED FRACTURE OF FIFTH METATARSAL BONE, RIGHT FOOT, INITIAL ENCOUNTER FOR CLOSED FRACTURE: ICD-10-CM

## 2022-12-21 PROCEDURE — 73700 CT LOWER EXTREMITY W/O DYE: CPT | Mod: RT

## 2022-12-22 ENCOUNTER — RX ONLY (OUTPATIENT)
Age: 62
Setting detail: RX ONLY
End: 2022-12-22

## 2022-12-22 RX ORDER — CICLOPIROX 80 MG/ML
1 SOLUTION TOPICAL
Qty: 6.6 | Refills: 11 | Status: ERX | COMMUNITY
Start: 2022-12-22

## 2023-01-11 LAB
25(OH)D3+25(OH)D2 SERPL-MCNC: 33.6 NG/ML (ref 30–100)
ALT SERPL-CCNC: 30 IU/L (ref 0–32)
BUN SERPL-MCNC: 18 MG/DL (ref 8–27)
BUN/CREAT SERPL: 33 (ref 12–28)
CALCIUM SERPL-MCNC: 9.9 MG/DL (ref 8.7–10.3)
CHLORIDE SERPL-SCNC: 101 MMOL/L (ref 96–106)
CHOLEST SERPL-MCNC: 203 MG/DL (ref 100–199)
CO2 SERPL-SCNC: 26 MMOL/L (ref 20–29)
CREAT SERPL-MCNC: 0.55 MG/DL (ref 0.57–1)
EGFRCR SERPLBLD CKD-EPI 2021: 102 ML/MIN/1.73
GLUCOSE SERPL-MCNC: 98 MG/DL (ref 70–99)
HDLC SERPL-MCNC: 121 MG/DL
LABORATORY COMMENT REPORT: ABNORMAL
LDLC SERPL CALC-MCNC: 71 MG/DL (ref 0–99)
POTASSIUM SERPL-SCNC: 4.1 MMOL/L (ref 3.5–5.2)
SODIUM SERPL-SCNC: 141 MMOL/L (ref 134–144)
TRIGL SERPL-MCNC: 60 MG/DL (ref 0–149)
TSH SERPL DL<=0.005 MIU/L-ACNC: 2.17 UIU/ML (ref 0.45–4.5)
VLDLC SERPL CALC-MCNC: 11 MG/DL (ref 5–40)

## 2023-01-13 ENCOUNTER — OFFICE VISIT (OUTPATIENT)
Dept: CARDIOLOGY | Facility: MEDICAL CENTER | Age: 63
End: 2023-01-13
Payer: COMMERCIAL

## 2023-01-13 VITALS
OXYGEN SATURATION: 97 % | SYSTOLIC BLOOD PRESSURE: 120 MMHG | WEIGHT: 112 LBS | BODY MASS INDEX: 21.14 KG/M2 | HEART RATE: 100 BPM | DIASTOLIC BLOOD PRESSURE: 62 MMHG | HEIGHT: 61 IN | RESPIRATION RATE: 16 BRPM

## 2023-01-13 DIAGNOSIS — R07.89 OTHER CHEST PAIN: ICD-10-CM

## 2023-01-13 DIAGNOSIS — Z82.49 FAMILY HISTORY OF PREMATURE CAD: ICD-10-CM

## 2023-01-13 DIAGNOSIS — E78.5 DYSLIPIDEMIA: Chronic | ICD-10-CM

## 2023-01-13 DIAGNOSIS — Z82.3 FAMILY HISTORY OF STROKE: ICD-10-CM

## 2023-01-13 LAB — EKG IMPRESSION: NORMAL

## 2023-01-13 PROCEDURE — 99214 OFFICE O/P EST MOD 30 MIN: CPT | Performed by: INTERNAL MEDICINE

## 2023-01-13 PROCEDURE — 93000 ELECTROCARDIOGRAM COMPLETE: CPT | Performed by: INTERNAL MEDICINE
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
## 2023-05-09 ENCOUNTER — OFFICE VISIT (OUTPATIENT)
Dept: MEDICAL GROUP | Facility: PHYSICIAN GROUP | Age: 63
End: 2023-05-09
Payer: COMMERCIAL

## 2023-05-09 VITALS
OXYGEN SATURATION: 99 % | DIASTOLIC BLOOD PRESSURE: 72 MMHG | TEMPERATURE: 98 F | RESPIRATION RATE: 20 BRPM | HEART RATE: 82 BPM | HEIGHT: 61 IN | WEIGHT: 109.38 LBS | BODY MASS INDEX: 20.65 KG/M2 | SYSTOLIC BLOOD PRESSURE: 118 MMHG

## 2023-05-09 DIAGNOSIS — R53.82 CHRONIC FATIGUE: ICD-10-CM

## 2023-05-09 DIAGNOSIS — B00.9 HERPES SIMPLEX TYPE 1 INFECTION: Chronic | ICD-10-CM

## 2023-05-09 DIAGNOSIS — M85.80 OSTEOPENIA, UNSPECIFIED LOCATION: ICD-10-CM

## 2023-05-09 DIAGNOSIS — E55.9 VITAMIN D DEFICIENCY: Chronic | ICD-10-CM

## 2023-05-09 DIAGNOSIS — E78.5 DYSLIPIDEMIA: Chronic | ICD-10-CM

## 2023-05-09 DIAGNOSIS — F51.01 PRIMARY INSOMNIA: Chronic | ICD-10-CM

## 2023-05-09 PROCEDURE — 99214 OFFICE O/P EST MOD 30 MIN: CPT | Performed by: INTERNAL MEDICINE

## 2023-05-09 RX ORDER — DESONIDE 0.5 MG/G
OINTMENT TOPICAL
COMMUNITY
Start: 2023-04-16

## 2023-05-09 RX ORDER — LIFITEGRAST 50 MG/ML
SOLUTION/ DROPS OPHTHALMIC
COMMUNITY
Start: 2023-04-19

## 2023-05-09 RX ORDER — KETOCONAZOLE 20 MG/ML
SHAMPOO TOPICAL
COMMUNITY
Start: 2023-04-24

## 2023-05-09 RX ORDER — ATORVASTATIN CALCIUM 20 MG/1
20 TABLET, FILM COATED ORAL DAILY
Qty: 90 TABLET | Refills: 3 | Status: SHIPPED | OUTPATIENT
Start: 2023-05-09

## 2023-05-09 RX ORDER — CICLOPIROX 80 MG/ML
SOLUTION TOPICAL
COMMUNITY
Start: 2023-04-24

## 2023-05-09 ASSESSMENT — PATIENT HEALTH QUESTIONNAIRE - PHQ9: CLINICAL INTERPRETATION OF PHQ2 SCORE: 0

## 2023-05-09 NOTE — ASSESSMENT & PLAN NOTE
Chronic recurrent condition.  Patient takes Valtrex 500 mg daily with significant side effects reported..  Currently she is asymptomatic.

## 2023-05-09 NOTE — ASSESSMENT & PLAN NOTE
Chronic condition.  Recent lipid panel showed improvement in the total and LDL cholesterol level.  Lab test result discussed with the patient.  She is presently taking atorvastatin 20 Mg daily.  No significant side effects reported.

## 2023-05-09 NOTE — ASSESSMENT & PLAN NOTE
Chronic ongoing condition.  The patient takes vitamin D supplementation.  Lab test requested for follow-up.

## 2023-05-09 NOTE — ASSESSMENT & PLAN NOTE
Patient reported that she had bone density test done at [facility] 1 year ago.  The report is not available.  I have asked my assistant to try to obtain the report for review.  Advised the patient to continue take calcium and vitamin D supplementation as well as weightbearing exercises.
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
## 2023-05-09 NOTE — LETTER
[facility]  Shaq Mahoney M.D.  [address]  [place] [address]  Fax: [phone]   Authorization for Release/Disclosure of   Protected Health Information   Name: MANUEL KWOK : 1960 SSN: [id number]   Address: [address] [place] [address] [address] [place] [address] Phone:    [phone] (home) [phone] (work)   I authorize the entity listed below to release/disclose the PHI below to:   [facility]/Shaq Mahoney M.D. and Shaq Mahoney M.D.   Provider or Entity Name:                                              [facility]    Address   [facility], [facility], [facility]   Phone:  [phone]    Fax:  [phone]   Reason for request: continuity of care   Information to be released:    [  ] LAST COLONOSCOPY,  including any PATH REPORT and follow-up  [  ] LAST FIT/COLOGUARD RESULT [XX] LAST DEXA  [  ] LAST MAMMOGRAM  [  ] LAST PAP  [  ] LAST LABS [  ] RETINA EXAM REPORT  [  ] IMMUNIZATION RECORDS  [  ] Release all info      [  ] Check here and initial the line next to each item to release ALL health information INCLUDING  _____ Care and treatment for drug and / or alcohol abuse  _____ HIV testing, infection status, or AIDS  _____ Genetic Testing    DATES OF SERVICE OR TIME PERIOD TO BE DISCLOSED: _____________  I understand and acknowledge that:  * This Authorization may be revoked at any time by you in writing, except if your health information has already been used or disclosed.  * Your health information that will be used or disclosed as a result of you signing this authorization could be re-disclosed by the recipient. If this occurs, your re-disclosed health information may no longer be protected by State or Federal laws.  * You may refuse to sign this Authorization. Your refusal will not affect your ability to obtain treatment.  * This Authorization becomes effective upon signing and will  on (date) __________.      If no date is indicated, this Authorization will  one (1) year from the  signature date.    Name: Anisa Diogo Murillo  Signature:  continuity of care Date:   5/9/2023     PLEASE FAX REQUESTED RECORDS BACK TO: [phone]

## 2023-05-09 NOTE — ASSESSMENT & PLAN NOTE
Chronic condition.  The patient take trazodone as needed.  Patient denies significant side effects.

## 2023-10-26 LAB
25(OH)D3+25(OH)D2 SERPL-MCNC: 50 NG/ML (ref 30–100)
ALT SERPL-CCNC: 34 IU/L (ref 0–32)
BUN SERPL-MCNC: 13 MG/DL (ref 8–27)
BUN/CREAT SERPL: 19 (ref 12–28)
CALCIUM SERPL-MCNC: 9.6 MG/DL (ref 8.7–10.3)
CHLORIDE SERPL-SCNC: 99 MMOL/L (ref 96–106)
CHOLEST SERPL-MCNC: 197 MG/DL (ref 100–199)
CO2 SERPL-SCNC: 26 MMOL/L (ref 20–29)
CREAT SERPL-MCNC: 0.67 MG/DL (ref 0.57–1)
EGFRCR SERPLBLD CKD-EPI 2021: 97 ML/MIN/1.73
ERYTHROCYTE [DISTWIDTH] IN BLOOD BY AUTOMATED COUNT: 11.4 % (ref 11.7–15.4)
GLUCOSE SERPL-MCNC: 107 MG/DL (ref 70–99)
HCT VFR BLD AUTO: 43.4 % (ref 34–46.6)
HDLC SERPL-MCNC: 114 MG/DL
HGB BLD-MCNC: 14.5 G/DL (ref 11.1–15.9)
LABORATORY COMMENT REPORT: NORMAL
LDLC SERPL CALC-MCNC: 69 MG/DL (ref 0–99)
MCH RBC QN AUTO: 33.6 PG (ref 26.6–33)
MCHC RBC AUTO-ENTMCNC: 33.4 G/DL (ref 31.5–35.7)
MCV RBC AUTO: 101 FL (ref 79–97)
NRBC BLD AUTO-RTO: ABNORMAL %
PLATELET # BLD AUTO: 253 X10E3/UL (ref 150–450)
POTASSIUM SERPL-SCNC: 3.7 MMOL/L (ref 3.5–5.2)
RBC # BLD AUTO: 4.32 X10E6/UL (ref 3.77–5.28)
SODIUM SERPL-SCNC: 138 MMOL/L (ref 134–144)
TRIGL SERPL-MCNC: 80 MG/DL (ref 0–149)
TSH SERPL DL<=0.005 MIU/L-ACNC: 3.32 UIU/ML (ref 0.45–4.5)
VLDLC SERPL CALC-MCNC: 14 MG/DL (ref 5–40)
WBC # BLD AUTO: 4.7 X10E3/UL (ref 3.4–10.8)

## 2023-11-06 ENCOUNTER — OFFICE VISIT (OUTPATIENT)
Dept: MEDICAL GROUP | Facility: PHYSICIAN GROUP | Age: 63
End: 2023-11-06
Payer: COMMERCIAL

## 2023-11-06 VITALS
DIASTOLIC BLOOD PRESSURE: 82 MMHG | HEIGHT: 61 IN | WEIGHT: 113 LBS | OXYGEN SATURATION: 97 % | HEART RATE: 86 BPM | SYSTOLIC BLOOD PRESSURE: 132 MMHG | BODY MASS INDEX: 21.34 KG/M2 | TEMPERATURE: 98.4 F

## 2023-11-06 DIAGNOSIS — B00.9 HERPES SIMPLEX TYPE 1 INFECTION: Chronic | ICD-10-CM

## 2023-11-06 DIAGNOSIS — M85.80 OSTEOPENIA, UNSPECIFIED LOCATION: ICD-10-CM

## 2023-11-06 DIAGNOSIS — E55.9 VITAMIN D DEFICIENCY: Chronic | ICD-10-CM

## 2023-11-06 DIAGNOSIS — Z12.31 ENCOUNTER FOR SCREENING MAMMOGRAM FOR MALIGNANT NEOPLASM OF BREAST: ICD-10-CM

## 2023-11-06 DIAGNOSIS — R53.82 CHRONIC FATIGUE: ICD-10-CM

## 2023-11-06 DIAGNOSIS — L30.9 ECZEMA, UNSPECIFIED TYPE: Chronic | ICD-10-CM

## 2023-11-06 DIAGNOSIS — F51.01 PRIMARY INSOMNIA: Chronic | ICD-10-CM

## 2023-11-06 DIAGNOSIS — Z11.59 NEED FOR HEPATITIS C SCREENING TEST: ICD-10-CM

## 2023-11-06 DIAGNOSIS — E78.5 DYSLIPIDEMIA: Chronic | ICD-10-CM

## 2023-11-06 PROCEDURE — 99214 OFFICE O/P EST MOD 30 MIN: CPT | Performed by: INTERNAL MEDICINE

## 2023-11-06 PROCEDURE — 3075F SYST BP GE 130 - 139MM HG: CPT | Performed by: INTERNAL MEDICINE

## 2023-11-06 PROCEDURE — 3079F DIAST BP 80-89 MM HG: CPT | Performed by: INTERNAL MEDICINE

## 2023-11-06 RX ORDER — ZOLPIDEM TARTRATE 5 MG/1
5 TABLET ORAL NIGHTLY PRN
Qty: 30 TABLET | Refills: 2 | Status: SHIPPED | OUTPATIENT
Start: 2023-11-06 | End: 2023-11-06

## 2023-11-06 NOTE — ASSESSMENT & PLAN NOTE
Chronic ongoing condition.  The patient is taking atorvastatin 20 Mg daily.  Pt reports compliance and tolerance with current medication(s)

## 2023-11-06 NOTE — ASSESSMENT & PLAN NOTE
Chronic cond  Dexa scan completed 2023  The recent DEXA scan showed low bone density.    Recommendation:  Patient recommended to take vitamin D3 2000 units daily and calcium 600 mg twice a day. Please continue weight bearing exercises as tolerated.

## 2023-11-06 NOTE — ASSESSMENT & PLAN NOTE
Chronic condition.  The patient takes Valtrex as needed.  Patient tolerating medication well.  Patient currently asymptomatic.

## 2023-11-06 NOTE — ASSESSMENT & PLAN NOTE
Chronic condition.  The patient takes vitamin D supplementation daily.  Patient tolerating medication well.

## 2023-11-06 NOTE — ASSESSMENT & PLAN NOTE
This is a chronic condition.  The patient was on trazodone.   previously.     Patient reported trazodone has not been effective.  She has stopped taking this medication.    Today we discussed with the patient regarding the option to try other medications such as Ambien.  Potential side effect of medication discussed with the patient.  Patient not interested in trying out any other medication at this time.

## 2023-11-06 NOTE — PROGRESS NOTES
PRIMARY CARE CLINIC VISIT        Chief Complaint   Patient presents with    Follow-Up      Osteopenia  Insomnia  herpes simplex   eczema  Vitamin D deficiency  Hyperlipidemia        History of Present Illness     Insomnia  This is a chronic condition.  The patient was on trazodone.   previously.     Patient reported trazodone has not been effective.  She has stopped taking this medication.    Today we discussed with the patient regarding the option to try other medications such as Ambien.  Potential side effect of medication discussed with the patient.  Patient not interested in trying out any other medication at this time.    Herpes simplex type 1 infection  Chronic condition.  The patient takes Valtrex as needed.  Patient tolerating medication well.  Patient currently asymptomatic.    Eczema  Chronic condition.  The patient uses desonide ointment as needed.  No new symptoms reported.    Vitamin D deficiency  Chronic condition.  The patient takes vitamin D supplementation daily.  Patient tolerating medication well.    Dyslipidemia  Chronic ongoing condition.  The patient is taking atorvastatin 20 Mg daily.  Pt reports compliance and tolerance with current medication(s)      Osteopenia  Chronic cond  Dexa scan completed 2023  The recent DEXA scan showed low bone density.    Recommendation:  Patient recommended to take vitamin D3 2000 units daily and calcium 600 mg twice a day. Please continue weight bearing exercises as tolerated.             Current Outpatient Medications on File Prior to Visit   Medication Sig Dispense Refill    atorvastatin (LIPITOR) 20 MG Tab Take 1 Tablet by mouth every day. 90 Tablet 3    valACYclovir (VALTREX) 500 MG Tab Take 500 mg by mouth 1 time a day as needed.  3    vitamin D (CHOLECALCIFEROL) 1000 UNIT TABS Take 2,000 Units by mouth every day.      ciclopirox (PENLAC) 8 % solution APPLY TO CLEAN TOENAILS ONCE A DAY, CLEAN NAILS WITH ALCOHOL WIPE ONCE A WEEK.      desonide (DESOWEN) 0.05  % ointment APPLY TO AFFECTED AREA TWICE A DAY WHEN FLARING      ketoconazole (NIZORAL) 2 % shampoo WASH AFFECTED AREAS ON BODY ONCE A DAY AS NEEDED      XIIDRA 5 % Solution PLACE 1 DROP INTO BOTH EYES TWICE A DAY      diclofenac sodium (VOLTAREN) 1 % Gel APPLY TWICE DAILY AS NEEDED TO AFFECTED AREA      Multiple Vitamin (MULTI-VITAMINS PO) Take  by mouth.      Ascorbic Acid (CORNELIO-C PO) Take  by mouth.      FIBER SELECT GUMMIES PO Take  by mouth.      Calcium-Vitamin D (CALCIUM + D PO) Take  by mouth.       No current facility-administered medications on file prior to visit.        Allergies: Patient has no known allergies.    Current Outpatient Medications Ordered in Epic   Medication Sig Dispense Refill    atorvastatin (LIPITOR) 20 MG Tab Take 1 Tablet by mouth every day. 90 Tablet 3    valACYclovir (VALTREX) 500 MG Tab Take 500 mg by mouth 1 time a day as needed.  3    vitamin D (CHOLECALCIFEROL) 1000 UNIT TABS Take 2,000 Units by mouth every day.      ciclopirox (PENLAC) 8 % solution APPLY TO CLEAN TOENAILS ONCE A DAY, CLEAN NAILS WITH ALCOHOL WIPE ONCE A WEEK.      desonide (DESOWEN) 0.05 % ointment APPLY TO AFFECTED AREA TWICE A DAY WHEN FLARING      ketoconazole (NIZORAL) 2 % shampoo WASH AFFECTED AREAS ON BODY ONCE A DAY AS NEEDED      XIIDRA 5 % Solution PLACE 1 DROP INTO BOTH EYES TWICE A DAY      diclofenac sodium (VOLTAREN) 1 % Gel APPLY TWICE DAILY AS NEEDED TO AFFECTED AREA      Multiple Vitamin (MULTI-VITAMINS PO) Take  by mouth.      Ascorbic Acid (CORNELIO-C PO) Take  by mouth.      FIBER SELECT GUMMIES PO Take  by mouth.      Calcium-Vitamin D (CALCIUM + D PO) Take  by mouth.       No current Epic-ordered facility-administered medications on file.       Past Medical History:   Diagnosis Date    Anxiety 3/24/2015    Contusion 10/26/2015    Eczema 3/22/2016    Herpes simplex type 1 infection 10/26/2015    Hypertension     Insomnia     Osteopenia 3/24/2015       Past Surgical History:   Procedure  "Laterality Date    OK BREAST AUGMENTATION WITH IMPLANT         Family History   Problem Relation Age of Onset    Cancer Mother         breast     Diabetes Mother     Heart Disease Mother 80        carotid disease and CEA    Stroke Father     Stroke Sister     Genetic Disorder Son         autism        Social History     Tobacco Use   Smoking Status Former    Current packs/day: 0.00    Types: Cigarettes   Smokeless Tobacco Never       Social History     Substance and Sexual Activity   Alcohol Use Yes    Alcohol/week: 3.6 oz    Types: 6 Standard drinks or equivalent per week    Comment: 2x a week       Review of systems.  As per HPI above. All other systems reviewed and negative.      Past Medical, Social, and Family history reviewed and updated in EPIC     Objective     /82 (BP Location: Left arm, Patient Position: Sitting, BP Cuff Size: Adult)   Pulse 86   Temp 36.9 °C (98.4 °F) (Temporal)   Ht 1.549 m (5' 1\")   Wt 51.3 kg (113 lb)   SpO2 97%    Body mass index is 21.35 kg/m².    General: alert in no apparent distress.  Cardiovascular: regular rate and rhythm  Pulmonary: lungs : no wheezing   Gastrointestinal: BS present.   Cranial nerves 2-12 grossly intact    Lab Results   Component Value Date/Time    HBA1C 5.3 09/21/2020 04:54 AM    HBA1C 5.2 03/01/2018 04:12 PM       Lab Results   Component Value Date/Time    WBC 4.7 10/25/2023 04:22 AM    WBC 5.7 08/27/2013 07:38 AM    HEMOGLOBIN 14.5 10/25/2023 04:22 AM    HEMOGLOBIN 13.9 08/27/2013 07:38 AM    HEMATOCRIT 43.4 10/25/2023 04:22 AM    HEMATOCRIT 40.5 08/27/2013 07:38 AM     (H) 10/25/2023 04:22 AM    .0 (H) 08/27/2013 07:38 AM    PLATELETCT 253 10/25/2023 04:22 AM    PLATELETCT 241 08/27/2013 07:38 AM         Lab Results   Component Value Date/Time    SODIUM 138 10/25/2023 04:22 AM    SODIUM 139 10/08/2013 07:40 AM    POTASSIUM 3.7 10/25/2023 04:22 AM    POTASSIUM 3.8 10/08/2013 07:40 AM    GLUCOSE 107 (H) 10/25/2023 04:22 AM    GLUCOSE " 88 10/08/2013 07:40 AM    BUN 13 10/25/2023 04:22 AM    BUN 21 10/08/2013 07:40 AM    CREATININE 0.67 10/25/2023 04:22 AM    CREATININE 0.65 10/08/2013 07:40 AM       Lab Results   Component Value Date/Time    CHOLSTRLTOT 197 10/25/2023 04:22 AM    CHOLSTRLTOT 214 (H) 10/08/2013 07:40 AM    TRIGLYCERIDE 80 10/25/2023 04:22 AM    TRIGLYCERIDE 61 10/08/2013 07:40 AM     10/25/2023 04:22 AM     10/08/2013 07:40 AM    LDL 90 11/29/2019 04:09 AM    LDL 81 10/08/2013 07:40 AM       Lab Results   Component Value Date/Time    ALTSGPT 34 (H) 10/25/2023 04:22 AM    ALTSGPT 15 08/13/2021 07:17 AM             Assessment and Plan     1. Primary insomnia  Chronic condition.  Patient reported that trazodone has not been effective.  She has stopped taking this medication.  At this time the patient not interested in taking medication at this time.  Sleep hygiene discussed with the patient.    2. Herpes simplex type 1 infection  Chronic stable condition.  The patient takes Valtrex as needed.  Currently patient asymptomatic    3. Eczema, unspecified type  Chronic stable condition.  Currently patient asymptomatic.  Continue to use desonide ointment as needed.    4. Vitamin D deficiency  Chronic condition.  Stable continue vitamin D supplementation 2000 unit daily    5. Dyslipidemia  Chronic stable condition.  Continue atorvastatin 20 Mg daily    6. Osteopenia, unspecified location  Chronic condition.  Recommend weightbearing exercise.  Continue calcium and vitamin D supplementation.    7. Encounter for screening mammogram for malignant neoplasm of breast  - MA-SCREENING MAMMO BILAT W/TOMOSYNTHESIS W/CAD; Future      Immunization status.  Recommend patient to go to the pharmacy to get influenza and pneumococcal vaccine.                Please note that this dictation was created using voice recognition software. I have made every reasonable attempt to correct obvious errors, but I expect that there are errors of grammar and  possibly content that I did not discover before finalizing the note.    Shaq Mahoney MD  Internal Medicine  [facility] [place]

## 2023-11-09 ENCOUNTER — HOSPITAL ENCOUNTER (OUTPATIENT)
Dept: RADIOLOGY | Facility: MEDICAL CENTER | Age: 63
End: 2023-11-09
Attending: INTERNAL MEDICINE
Payer: COMMERCIAL

## 2023-11-09 DIAGNOSIS — Z12.31 ENCOUNTER FOR SCREENING MAMMOGRAM FOR MALIGNANT NEOPLASM OF BREAST: ICD-10-CM

## 2023-11-09 PROCEDURE — 77063 BREAST TOMOSYNTHESIS BI: CPT

## 2023-11-10 ENCOUNTER — OFFICE VISIT (OUTPATIENT)
Dept: CARDIOLOGY | Facility: MEDICAL CENTER | Age: 63
End: 2023-11-10
Attending: INTERNAL MEDICINE
Payer: COMMERCIAL

## 2023-11-10 VITALS
SYSTOLIC BLOOD PRESSURE: 122 MMHG | DIASTOLIC BLOOD PRESSURE: 78 MMHG | HEIGHT: 61 IN | BODY MASS INDEX: 20.39 KG/M2 | WEIGHT: 108 LBS | HEART RATE: 86 BPM | OXYGEN SATURATION: 97 % | RESPIRATION RATE: 16 BRPM

## 2023-11-10 DIAGNOSIS — Z82.49 FAMILY HISTORY OF ATHEROSCLEROSIS: Chronic | ICD-10-CM

## 2023-11-10 DIAGNOSIS — Z82.3 FAMILY HISTORY OF STROKE: ICD-10-CM

## 2023-11-10 PROCEDURE — 99213 OFFICE O/P EST LOW 20 MIN: CPT | Performed by: INTERNAL MEDICINE

## 2023-11-10 PROCEDURE — 3078F DIAST BP <80 MM HG: CPT | Performed by: INTERNAL MEDICINE

## 2023-11-10 PROCEDURE — 3074F SYST BP LT 130 MM HG: CPT | Performed by: INTERNAL MEDICINE

## 2023-11-10 NOTE — PATIENT INSTRUCTIONS
A - Antiplatelet - Clopidogrel (PLAVIX) reduces your risk of cardiac event by 27% compared to Aspirin 81 mg daily, prasrugrel (EFFIENT), ticagrelor (BRILINTA)) may be used for the first year.  Aspirin 81 mg daily is assocaited with a 20% less use of heart event and is used the first year after a cardiac event, stent or CABG  B - Blood Pressure Control - reduces your risk or heart attack and stroke.  C - Cholesterol Management - statins reduce your risk; for those that are intolerant to statins, there are alternatives.  D - Diet - Cardiac rehab diets, Cardiosmart.org.  E - Exercise - at least 5 hours of moderate exercise weekly  (typical brisk walking or similar activity).  F - Fats - VASCEPA,  or Fish oil (if Vascepa too expensive) for elevated triglycerides (REDUCE IT trial showed reduction from 22% 5 year MACE to 17%).  G - Good Vibes - meditation, exercise, yoga, Pilates, mindfulness, Keo-Chi, stress reduction.  H - Heart Failure - betablockers, sucubatril (ENTRESTO) 16% less risk of dying over 3 years, spironolactone, dapagliflozin (FARXIGA) 17% less risk of dying over 2 years, CRT +/- ICD.  I - Inflammation - Colchicine in the LoDoCo2 study in 2020 reduced the risk of heart attack by 30% in 2.5 year follow up.  R - Rehab - Cardiac rehab reduces risk of dying by 13-24% and need to go to the hospital by 30% within the first year.  S - Smoking - never smoke, if you do smoke ask for help to quit for good. Patients who quit smoking after heart attack have 36% less likely risk of dying.  Resources are 1-800-QUIT-NOW Whois in addition to Chantix, Zyban or nicotine replacement  T - Type II Diabetes - pills empagliflozin (JARDIANCE) 38% less risk of dying over 4 years, and/or weekly injections liraglutide (Victoza), semaglutide (Ozempic), dulaglutide (Trulicity) ~26% less risk of MACE in 2 years.  V - Vaccines - Annual flu shot and COVID vaccine reduces the risk of serious cardiovascular  complications from these deadly infections.  W - Weight - maintain a healthy weight.      Work on at least 2.5 - 5 hours a week of moderate exercise    Please look into the following diets and incorporate them into your diet  LOW SALT DIET   KEEP YOUR SODIUM EQUAL TO CALORIES AND NO MORE THAN DOUBLE THE CALORIES FOR A LOW SALT DIET    Cardiosmart.org - great resource for American College of Cardiology on heart disease prevention and treatment    FOR TREATMENT OR PREVENTION OF CORONARY ARTERY DISEASE  These three programs are approved by Medicare/Insurers for those with heart disease  [place] [facility]  Dr. Salcedo's Program for Reversing Heart Disease - Jun [place] Cardiologist vegetarian-based  [facility] - [place]-based mind-body Program    Mediterranean Diet has been shown to be a hearty healthy diet.    This is a commonly referenced Program  Dr Cummins - [place] over [place] (book and documentary) - vegetarian-based    FOR TREATMENT OF BLOOD PRESSURE  DASH DIET - American Heart Association for treatment of HYPERTENSION    FOR TREATMENT OF BAD CHOLESTEROL/FATS  REDUCE PROCESSED SUGAR AS MUCH AS POSSIBLE  INCREASE WHOLE GRAINS/VEGETABLES  INCREASE FIBER    Lowering total cholesterol and LDL (bad) cholesterol:  - Eat leaner cuts of meat, or eliminate altogether if possible red meat, and frequently substitute fish or chicken.  - Limit saturated fat to no more than 7-10% of total calories no more than 10 g per day is recommended. Some sources of saturated fat include butter, animal fats, hydrogenated vegetable fats and oils, many desserts, whole milk dairy products.  - Replaced saturated fats with polyunsaturated fats and monounsaturated fats. Foods high in monounsaturated fat include nuts, canola oil, avocados, and olives.  - Limit trans fat (processed foods) and replaced with fresh fruits and vegetables  - Recommend nonfat dairy products  - Increase  substantially the amount of soluble fiber intake (legumes such as beans, fruit, whole grains).  - Consider nutritional supplements: plant sterile spreads such as Benecol, fish oil,  flaxseed oil, omega-3 acids capsules 1000 mg twice a day, or viscous fiber such as Metamucil  - Attain ideal weight and regular exercise (at least 30 minutes per day of moderate exercise)  ASK ABOUT STATIN OR NON STATIN MEDICATION TO REDUCE YOUR LDL AND HEART RISK    Lowering triglycerides:  - Reduce intake of simple sugar: Desserts, candy, pastries, honey, sodas, sugared cereals, yogurt, Gatorade, sports bars, canned fruit, smoothies, fruit juice, coffee drinks  - Reduced intake of refined starches: Refined Pasta, most bread  - Reduce or abstain from alcohol  - Increase omega-3 fatty acids: [place], Trout, Mackerel, Herring, [place]ac[place] tuna and supplements  - Attain ideal weight and regular exercise (at least 30 minutes per day of moderate exercise)  ASK ABOUT PURIFIED OMEGA 3 EPA or FISH OIL TO REDUCE YOUR TG AND HEART RISK    Elevating HDL (good) cholesterol:  - Increase physical activity  - Increase omega-3 fatty acids and supplements as listed above  - Incorporating appropriate amounts of monounsaturated fats such as nuts, olive oil, canola oil, avocados, olives  - Stop smoking  - Attain ideal weight and regular exercise (at least 30 minutes per day of moderate exercise)

## 2023-11-12 DIAGNOSIS — T85.43XD RUPTURE OF IMPLANT OF LEFT BREAST, SUBSEQUENT ENCOUNTER: ICD-10-CM

## 2023-11-12 PROBLEM — T85.43XA RUPTURED LEFT BREAST IMPLANT: Status: ACTIVE | Noted: 2023-11-12

## 2023-11-12 PROBLEM — Z00.00 ANNUAL PHYSICAL EXAM: Status: ACTIVE | Noted: 2023-11-12

## 2023-11-14 ENCOUNTER — TELEPHONE (OUTPATIENT)
Dept: MEDICAL GROUP | Facility: PHYSICIAN GROUP | Age: 63
End: 2023-11-14
Payer: COMMERCIAL

## 2023-11-14 NOTE — TELEPHONE ENCOUNTER
Phone Number Called: [phone] (home) [phone] (work)     Call outcome: Spoke to patient regarding message below.    Message: Patient called and left a message wanting to talk with someone regarding her mammogram results. I called patient, explained that per Dr. Mahoney message, she was being referred to a general surgeon for a possible rupture in her left implant. Patient understood.

## 2023-11-17 ENCOUNTER — TELEPHONE (OUTPATIENT)
Dept: MEDICAL GROUP | Facility: PHYSICIAN GROUP | Age: 63
End: 2023-11-17
Payer: COMMERCIAL

## 2023-11-18 NOTE — TELEPHONE ENCOUNTER
VOICEMAIL  1. Caller Name: Anisa Murillo                       Call Back Number: [phone] (home) [phone] (work)     2. Message: Patient left a voicemail stating that she needs a copy of her film and report regarding her implant rupture.     3. Patient approves office to leave a detailed voicemail/MyChart message: N\A

## 2023-11-21 ENCOUNTER — TELEPHONE (OUTPATIENT)
Dept: CARDIOLOGY | Facility: MEDICAL CENTER | Age: 63
End: 2023-11-21
Payer: COMMERCIAL

## 2023-11-21 NOTE — TELEPHONE ENCOUNTER
CW       Caller: Anisa Murillo     Topic/issue: Wants a code for the Vascular Screening so she can see if her insurance will cover it     Callback Number: [phone]    Thank You   Sarah REINOSO

## 2023-11-22 NOTE — TELEPHONE ENCOUNTER
Upon chart review dx provided with total vascular screening, Family history of atherosclerosis [Z82.49]     Returned pt call, [phone] , and reviewed findings.  She verbalizes understanding and states no other concerns or questions at this time.  Anisa is appreciative of information given.

## 2023-11-27 ENCOUNTER — TELEPHONE (OUTPATIENT)
Dept: CARDIOLOGY | Facility: MEDICAL CENTER | Age: 63
End: 2023-11-27
Payer: COMMERCIAL

## 2023-11-27 NOTE — TELEPHONE ENCOUNTER
Phone number called: [phone]     Call outcome: Spoke to insurance company and they stated that someone already returned their call and they have all of the information that they need.

## 2023-11-27 NOTE — TELEPHONE ENCOUNTER
----- Message from Brayan Ortiz Ass't sent at 2023  8:25 AM PST -----  Regarding: FW: After Hours    ----- Message -----  From: Lisa Hooker  Sent: 2023   7:46 AM PST  To: Ramila Paulino  Subject: After Hours                                      PATIENT NAME: Anisa Murillo  CALLER NAME: Gnip  REASON FOR CALL: Patient has a [employer] plan. It is unclear what kind of testing you are recommending. They are requesting codes to see if the test are covered.  PHONE NUMBER: [phone]  CARDIO PROVIDER: Dr. Claude Eisenberg  : 1960  MRN: [id number]  ADVISED 1-2 BUSINESS DAYS FOR CALL BACK Y/N:

## 2023-11-29 ENCOUNTER — TELEPHONE (OUTPATIENT)
Dept: CARDIOLOGY | Facility: MEDICAL CENTER | Age: 63
End: 2023-11-29
Payer: COMMERCIAL

## 2023-11-29 NOTE — TELEPHONE ENCOUNTER
Phone Number Called: [phone]    Call outcome: Spoke to patient regarding message below.    Message: Called to follow up on patient phone. Patient reports that she did not call and does not have any concerns at this time. Will call if she has any questions.     ----- Message from Nilam Lay R.N. sent at 11/29/2023  1:18 PM PST -----  Regarding: RE: Called overnight system  I didn't receive anything as well.  Sorlulú García!   ----- Message -----  From: Dafne Becerra R.N.  Sent: 11/29/2023  11:40 AM PST  To: Nilam Lay R.N.; Suki Gordon R.N.  Subject: RE: Called overnight system                      I didn't receive anything. Last call was for a code for insurance but not a patient concern/symptom call. Not sure what it would be. Did you get anything Nilam?    ----- Message -----  From: Suki Gordon R.N.  Sent: 11/29/2023  11:03 AM PST  To: Nilam Lay R.N.; Dafne Becerra R.N.  Subject: FW: Called overnight system                      Cat Laughlin and Tara,     Do either of you know about an overnight call? I saw one from 11/27/23 that Tara already called patient back? Please let me know if I need to follow up. Thank you!  ----- Message -----  From: NARA Levine  Sent: 11/29/2023  10:20 AM PST  To: Suki Gordon R.N.  Subject: RE: Called overnight system                      I have no idea. They were just on the list this morning, so I think the overnight MD was called but did not document or say anything to me this AM.     Maybe just pass on to CW RN to follow if they call during the day.   ----- Message -----  From: Suki Gordon R.N.  Sent: 11/29/2023  10:15 AM PST  To: NARA Levine  Subject: RE: Called overnight system                      To: JENNIG      I am not able to see the previous message. The note I see in epic is from 11/27/23. Is this the encounter you would like me to follow up on? Thanks!  ----- Message -----  From: Lissa Chen,  NARA  Sent: 11/29/2023   9:38 AM PST  To: Suki Gordon R.N.  Subject: Called overnight system                          Please follow-up

## 2023-12-13 ENCOUNTER — APPOINTMENT (RX ONLY)
Dept: URBAN - METROPOLITAN AREA CLINIC 35 | Facility: CLINIC | Age: 63
Setting detail: DERMATOLOGY
End: 2023-12-13

## 2023-12-13 DIAGNOSIS — D22 MELANOCYTIC NEVI: ICD-10-CM

## 2023-12-13 DIAGNOSIS — L82.1 OTHER SEBORRHEIC KERATOSIS: ICD-10-CM

## 2023-12-13 DIAGNOSIS — B35.1 TINEA UNGUIUM: ICD-10-CM

## 2023-12-13 DIAGNOSIS — L81.4 OTHER MELANIN HYPERPIGMENTATION: ICD-10-CM

## 2023-12-13 DIAGNOSIS — D18.0 HEMANGIOMA: ICD-10-CM

## 2023-12-13 DIAGNOSIS — B35.3 TINEA PEDIS: ICD-10-CM

## 2023-12-13 DIAGNOSIS — Z71.89 OTHER SPECIFIED COUNSELING: ICD-10-CM

## 2023-12-13 PROBLEM — D18.01 HEMANGIOMA OF SKIN AND SUBCUTANEOUS TISSUE: Status: ACTIVE | Noted: 2023-12-13

## 2023-12-13 PROBLEM — D22.39 MELANOCYTIC NEVI OF OTHER PARTS OF FACE: Status: ACTIVE | Noted: 2023-12-13

## 2023-12-13 PROCEDURE — ? TREATMENT REGIMEN

## 2023-12-13 PROCEDURE — ? ADDITIONAL NOTES

## 2023-12-13 PROCEDURE — 99214 OFFICE O/P EST MOD 30 MIN: CPT

## 2023-12-13 PROCEDURE — ? PRESCRIPTION

## 2023-12-13 PROCEDURE — ? OBSERVATION AND MEASURE

## 2023-12-13 PROCEDURE — ? LIQUID NITROGEN (COSMETIC)

## 2023-12-13 PROCEDURE — ? SUNSCREEN RECOMMENDATIONS

## 2023-12-13 PROCEDURE — ? COUNSELING

## 2023-12-13 RX ORDER — EFINACONAZOLE 100 MG/ML
SOLUTION TOPICAL
Qty: 4 | Refills: 3 | Status: ERX

## 2023-12-13 RX ORDER — KETOCONAZOLE 20 MG/G
1 CREAM TOPICAL TWICE A DAY
Qty: 60 | Refills: 11 | Status: ERX

## 2023-12-13 ASSESSMENT — LOCATION ZONE DERM
LOCATION ZONE: TOENAIL
LOCATION ZONE: FACE
LOCATION ZONE: TRUNK

## 2023-12-13 ASSESSMENT — LOCATION DETAILED DESCRIPTION DERM
LOCATION DETAILED: LEFT INFERIOR LATERAL LOWER BACK
LOCATION DETAILED: RIGHT INFERIOR UPPER BACK
LOCATION DETAILED: LEFT GREAT TOENAIL
LOCATION DETAILED: LEFT MEDIAL MALAR CHEEK
LOCATION DETAILED: RIGHT LATERAL SUPERIOR CHEST
LOCATION DETAILED: LEFT MEDIAL UPPER BACK
LOCATION DETAILED: RIGHT INFERIOR FOREHEAD
LOCATION DETAILED: LEFT SUPERIOR CENTRAL MALAR CHEEK

## 2023-12-13 ASSESSMENT — LOCATION SIMPLE DESCRIPTION DERM
LOCATION SIMPLE: LEFT LOWER BACK
LOCATION SIMPLE: LEFT UPPER BACK
LOCATION SIMPLE: CHEST
LOCATION SIMPLE: RIGHT FOREHEAD
LOCATION SIMPLE: RIGHT UPPER BACK
LOCATION SIMPLE: LEFT CHEEK
LOCATION SIMPLE: LEFT GREAT TOE

## 2023-12-13 NOTE — PROCEDURE: TREATMENT REGIMEN
Detail Level: Zone
Continue Regimen: - ketoconazole 2 % topical cream. Apply twice daily to feet, dry feet and massage in to feet and toes\\n\\n- jublia to toes once a day
Plan: - After labs are reviewed plan, Terbinafine Rx
Continue Regimen: - ketoconazole 2 % topical cream. Apply twice daily to feet, dry feet and massage in to feet and toes

## 2023-12-13 NOTE — PROCEDURE: MIPS QUALITY
Quality 226: Preventive Care And Screening: Tobacco Use: Screening And Cessation Intervention: Patient screened for tobacco use and is an ex/non-smoker
Quality 111:Pneumonia Vaccination Status For Older Adults: Patient received any pneumococcal conjugate or polysaccharide vaccine on or after their 60th birthday and before the end of the measurement period
Detail Level: Generalized
Quality 130: Documentation Of Current Medications In The Medical Record: Current Medications Documented
Quality 402: Tobacco Use And Help With Quitting Among Adolescents: Patient screened for tobacco and is an ex-smoker

## 2023-12-13 NOTE — PROCEDURE: LIQUID NITROGEN (COSMETIC)
Spray Paint Text: The liquid nitrogen was applied to the skin utilizing a spray paint frosting technique.
Render Post-Care Instructions In Note?: no
Billing Information: Bill by Static Price
Show Spray Paint Technique Variable?: Yes
Post-Care Instructions: I reviewed with the patient in detail post-care instructions. Patient is to wear sunprotection, and avoid picking at any of the treated lesions. Pt may apply Vaseline to crusted or scabbing areas.
Consent: The patient's consent was obtained including but not limited to risks of crusting, scabbing, blistering, scarring, darker or lighter pigmentary change, recurrence, incomplete removal and infection. The patient understands that the procedure is cosmetic in nature and is not covered by insurance.
Detail Level: Detailed

## 2024-01-12 ENCOUNTER — TELEPHONE (OUTPATIENT)
Dept: MEDICAL GROUP | Facility: PHYSICIAN GROUP | Age: 64
End: 2024-01-12
Payer: COMMERCIAL

## 2024-01-13 NOTE — TELEPHONE ENCOUNTER
Patient called requesting if we can cancel the pain maagement screening as she is not on a controlled substance.

## 2024-05-06 ENCOUNTER — OFFICE VISIT (OUTPATIENT)
Dept: MEDICAL GROUP | Facility: PHYSICIAN GROUP | Age: 64
End: 2024-05-06
Payer: COMMERCIAL

## 2024-05-06 VITALS
TEMPERATURE: 99.1 F | SYSTOLIC BLOOD PRESSURE: 134 MMHG | DIASTOLIC BLOOD PRESSURE: 86 MMHG | HEIGHT: 61 IN | BODY MASS INDEX: 21.24 KG/M2 | WEIGHT: 112.5 LBS | HEART RATE: 84 BPM | OXYGEN SATURATION: 95 %

## 2024-05-06 DIAGNOSIS — R73.03 PREDIABETES: ICD-10-CM

## 2024-05-06 DIAGNOSIS — E78.5 DYSLIPIDEMIA: ICD-10-CM

## 2024-05-06 DIAGNOSIS — E66.3 OVERWEIGHT: ICD-10-CM

## 2024-05-06 DIAGNOSIS — Z23 NEED FOR VACCINATION: ICD-10-CM

## 2024-05-06 DIAGNOSIS — B00.9 HERPES SIMPLEX TYPE 1 INFECTION: Chronic | ICD-10-CM

## 2024-05-06 DIAGNOSIS — R20.0 NUMBNESS OF LEFT HAND: ICD-10-CM

## 2024-05-06 DIAGNOSIS — E55.9 VITAMIN D DEFICIENCY: Chronic | ICD-10-CM

## 2024-05-06 DIAGNOSIS — T85.43XS RUPTURE OF IMPLANT OF LEFT BREAST, SEQUELA: ICD-10-CM

## 2024-05-06 DIAGNOSIS — F51.01 PRIMARY INSOMNIA: Chronic | ICD-10-CM

## 2024-05-06 PROCEDURE — 90677 PCV20 VACCINE IM: CPT | Performed by: INTERNAL MEDICINE

## 2024-05-06 PROCEDURE — 3079F DIAST BP 80-89 MM HG: CPT | Performed by: INTERNAL MEDICINE

## 2024-05-06 PROCEDURE — 99215 OFFICE O/P EST HI 40 MIN: CPT | Mod: 25 | Performed by: INTERNAL MEDICINE

## 2024-05-06 PROCEDURE — 3075F SYST BP GE 130 - 139MM HG: CPT | Performed by: INTERNAL MEDICINE

## 2024-05-06 PROCEDURE — 90471 IMMUNIZATION ADMIN: CPT | Performed by: INTERNAL MEDICINE

## 2024-05-06 RX ORDER — ATORVASTATIN CALCIUM 20 MG/1
20 TABLET, FILM COATED ORAL DAILY
Qty: 90 TABLET | Refills: 3 | Status: SHIPPED | OUTPATIENT
Start: 2024-05-06

## 2024-05-06 RX ORDER — FUROSEMIDE 20 MG/1
20 TABLET ORAL
Qty: 30 TABLET | Refills: 3 | Status: SHIPPED | OUTPATIENT
Start: 2024-05-06

## 2024-05-06 ASSESSMENT — PATIENT HEALTH QUESTIONNAIRE - PHQ9: CLINICAL INTERPRETATION OF PHQ2 SCORE: 0

## 2024-05-06 NOTE — ASSESSMENT & PLAN NOTE
Chronic condition.  The patient was seen by plastic surgery and the implant were removed and replaced with new implants.  Patient currently asymptomatic.  Recommend yearly mammogram.

## 2024-05-06 NOTE — ASSESSMENT & PLAN NOTE
This is a chronic condition.  The patient has been taking over-the-counter antihistamine as needed.  Patient tolerating medication well.

## 2024-05-06 NOTE — ASSESSMENT & PLAN NOTE
This is a new condition.  The patient reported recurrent numbness in her left hand noted since the last few months.  Patient denies recent trauma or injury.  Patient denies neck pain.  She denies motor weakness or paresthesia.

## 2024-05-06 NOTE — PROGRESS NOTES
PRIMARY CARE CLINIC VISIT        Chief Complaint   Patient presents with    Follow-Up   Numbness left hand  Follow-up hyperlipidemia  Prediabetes  Vitamin D deficiency  Herpes simplex infection  Rupture of implant left breast  Insomnia  Overweight  Vaccination status        History of Present Illness     Dyslipidemia  This is a chronic condition.  Patient presently taking atorvastatin 20 Mg daily.  Patient tolerating medication well.  Patient is due for lab test.    Prediabetes  This is a chronic condition.  Patient currently on diet therapy.  Lab test ordered for follow-up.    Vitamin D deficiency  Chronic condition.  The patient takes vitamin D supplementation.  Patient tolerating medication well.  Lab test requested for follow-up    Herpes simplex type 1 infection  Chronic condition.  Patient takes Valtrex as needed.  Currently the patient asymptomatic.    Ruptured left breast implant  Chronic condition.  The patient was seen by plastic surgery and the implant were removed and replaced with new implants.  Patient currently asymptomatic.  Recommend yearly mammogram.    Insomnia  This is a chronic condition.  The patient has been taking over-the-counter antihistamine as needed.  Patient tolerating medication well.    Numbness of left hand  This is a new condition.  The patient reported recurrent numbness in her left hand noted since the last few months.  Patient denies recent trauma or injury.  Patient denies neck pain.  She denies motor weakness or paresthesia.    Need for vaccination  Patient is due for pneumonia vaccination.    Current Outpatient Medications on File Prior to Visit   Medication Sig Dispense Refill    XIIDRA 5 % Solution PLACE 1 DROP INTO BOTH EYES TWICE A DAY      valACYclovir (VALTREX) 500 MG Tab Take 500 mg by mouth 1 time a day as needed.  3    vitamin D (CHOLECALCIFEROL) 1000 UNIT TABS Take 2,000 Units by mouth every day.      ciclopirox (PENLAC) 8 % solution APPLY TO CLEAN TOENAILS ONCE A  DAY, CLEAN NAILS WITH ALCOHOL WIPE ONCE A WEEK.      desonide (DESOWEN) 0.05 % ointment APPLY TO AFFECTED AREA TWICE A DAY WHEN FLARING      ketoconazole (NIZORAL) 2 % shampoo WASH AFFECTED AREAS ON BODY ONCE A DAY AS NEEDED      diclofenac sodium (VOLTAREN) 1 % Gel APPLY TWICE DAILY AS NEEDED TO AFFECTED AREA      Multiple Vitamin (MULTI-VITAMINS PO) Take  by mouth.      Ascorbic Acid (CORNELIO-C PO) Take  by mouth.      FIBER SELECT GUMMIES PO Take  by mouth.      Calcium-Vitamin D (CALCIUM + D PO) Take  by mouth.       No current facility-administered medications on file prior to visit.        Allergies: Patient has no known allergies.    Current Outpatient Medications Ordered in Epic   Medication Sig Dispense Refill    atorvastatin (LIPITOR) 20 MG Tab Take 1 Tablet by mouth every day. 90 Tablet 3    furosemide (LASIX) 20 MG Tab Take 1 Tablet by mouth 1 time a day as needed (water retention). 30 Tablet 3    XIIDRA 5 % Solution PLACE 1 DROP INTO BOTH EYES TWICE A DAY      valACYclovir (VALTREX) 500 MG Tab Take 500 mg by mouth 1 time a day as needed.  3    vitamin D (CHOLECALCIFEROL) 1000 UNIT TABS Take 2,000 Units by mouth every day.      ciclopirox (PENLAC) 8 % solution APPLY TO CLEAN TOENAILS ONCE A DAY, CLEAN NAILS WITH ALCOHOL WIPE ONCE A WEEK.      desonide (DESOWEN) 0.05 % ointment APPLY TO AFFECTED AREA TWICE A DAY WHEN FLARING      ketoconazole (NIZORAL) 2 % shampoo WASH AFFECTED AREAS ON BODY ONCE A DAY AS NEEDED      diclofenac sodium (VOLTAREN) 1 % Gel APPLY TWICE DAILY AS NEEDED TO AFFECTED AREA      Multiple Vitamin (MULTI-VITAMINS PO) Take  by mouth.      Ascorbic Acid (CORNELIO-C PO) Take  by mouth.      FIBER SELECT GUMMIES PO Take  by mouth.      Calcium-Vitamin D (CALCIUM + D PO) Take  by mouth.       No current Epic-ordered facility-administered medications on file.       Past Medical History:   Diagnosis Date    Anxiety 3/24/2015    Contusion 10/26/2015    Eczema 3/22/2016    Herpes simplex type 1  infection 10/26/2015    Hypertension     Insomnia     Osteopenia 3/24/2015       Past Surgical History:   Procedure Laterality Date    VA BREAST AUGMENTATION WITH IMPLANT         Family History   Problem Relation Age of Onset    Cancer Mother         breast     Diabetes Mother     Heart Disease Mother 80        carotid disease and CEA    Stroke Father     Stroke Sister     Genetic Disorder Son         autism        Social History     Tobacco Use   Smoking Status Former    Current packs/day: 0.00    Types: Cigarettes   Smokeless Tobacco Never       Social History     Substance and Sexual Activity   Alcohol Use Yes    Alcohol/week: 3.6 oz    Types: 6 Standard drinks or equivalent per week    Comment: 2x a week       Review of systems  As per HPI above. All other systems reviewed and negative.      Past Medical, Social, and Family history reviewed and updated in [facility]       LAB DATA:    Lab Results   Component Value Date/Time    HBA1C 5.3 09/21/2020 04:54 AM    HBA1C 5.2 03/01/2018 04:12 PM       Lab Results   Component Value Date/Time    WBC 4.7 10/25/2023 04:22 AM    WBC 5.7 08/27/2013 07:38 AM    HEMOGLOBIN 14.5 10/25/2023 04:22 AM    HEMOGLOBIN 13.9 08/27/2013 07:38 AM    HEMATOCRIT 43.4 10/25/2023 04:22 AM    HEMATOCRIT 40.5 08/27/2013 07:38 AM     (H) 10/25/2023 04:22 AM    .0 (H) 08/27/2013 07:38 AM    PLATELETCT 253 10/25/2023 04:22 AM    PLATELETCT 241 08/27/2013 07:38 AM       Lab Results   Component Value Date/Time    SODIUM 138 10/25/2023 04:22 AM    SODIUM 139 10/08/2013 07:40 AM    POTASSIUM 3.7 10/25/2023 04:22 AM    POTASSIUM 3.8 10/08/2013 07:40 AM    GLUCOSE 107 (H) 10/25/2023 04:22 AM    GLUCOSE 88 10/08/2013 07:40 AM    BUN 13 10/25/2023 04:22 AM    BUN 21 10/08/2013 07:40 AM    CREATININE 0.67 10/25/2023 04:22 AM    CREATININE 0.65 10/08/2013 07:40 AM       Lab Results   Component Value Date/Time    CHOLSTRLTOT 197 10/25/2023 04:22 AM    CHOLSTRLTOT 214 (H) 10/08/2013 07:40 AM     "TRIGLYCERIDE 80 10/25/2023 04:22 AM    TRIGLYCERIDE 61 10/08/2013 07:40 AM     10/25/2023 04:22 AM     10/08/2013 07:40 AM    LDL 90 11/29/2019 04:09 AM    LDL 81 10/08/2013 07:40 AM       Lab Results   Component Value Date/Time    ALTSGPT 34 (H) 10/25/2023 04:22 AM    ALTSGPT 15 08/13/2021 07:17 AM          Objective     /86 (BP Location: Right arm, Patient Position: Sitting, BP Cuff Size: Adult)   Pulse 84   Temp 37.3 °C (99.1 °F) (Temporal)   Ht 1.549 m (5' 1\")   Wt 51 kg (112 lb 8 oz)   SpO2 95%    Body mass index is 21.26 kg/m².    General: alert in no apparent distress.  Cardiovascular: regular rate and rhythm  Pulmonary: lungs : no wheezing   Gastrointestinal: BS present.       Assessment and Plan     1. Dyslipidemia  - ALANINE AMINO-TRANS; Future  - Lipid Profile; Future  - atorvastatin (LIPITOR) 20 MG Tab; Take 1 Tablet by mouth every day.  Dispense: 90 Tablet; Refill: 3  Chronic condition.  Current status unclear.  Lab test ordered him for follow-up continue with low-fat low-cholesterol diet.    2. Prediabetes  - Basic Metabolic Panel; Future  - HEMOGLOBIN A1C; Future  Chronic condition.  Current status unclear.  Blood test ordered for follow-up.  Continue with low sweet low-carb diet.    3. Vitamin D deficiency  - VITAMIN D,25 HYDROXY (DEFICIENCY); Future  Chronic condition.  Current status unclear.  Lab test requested for follow-up.    4. Herpes simplex type 1 infection  Chronic stable condition.  The patient continue to take Valtrex 500 mg daily as needed.  Currently she is asymptomatic.    5. Rupture of implant of left breast, sequela  Chronic condition.  Patient status post surgery.  The patient received a new implants bilaterally.  Currently the patient asymptomatic.  Recommend yearly mammogram.    6. Numbness of left hand  This is a new condition.  Rule out possible carpal tunnel syndrome.    Patient may also have cervical disc/nerve pathology.  Recommended nerve conduction " study/EMG to be done.  Follow-up after testing completed.  - Referral to Neurodiagnostics (EEG,EP,EMG/NCS/DBS)    7. Primary insomnia  Chronic stable condition.  The patient continue to take over-the-counter antihistamine as needed.    8. Overweight  - TSH; Future  Chronic condition.  Uncontrolled.  Recommend diet and lifestyle modification.  Courage patient to lose some weight.      9. Need for vaccination  - Pneumococcal Conjugate Vaccine 20-Valent              Attestation: I spent:   49  minutes -    That includes time for chart review before the visit, the actual patient visit, and time spent on documentation in EMR after the visit.  Chart review/prep, review of other providers' records, imaging/lab review, face-to-face time for history/examination, pt's counseling/education, ordering, prescribing,  review of results/meds/ treatment plan with patient, and care coordination.                 Please note that this dictation was created using voice recognition software. I have made every reasonable attempt to correct obvious errors, but I expect that there are errors of grammar and possibly content that I did not discover before finalizing the note.    Shaq Mahoney MD  Internal Medicine  [facility]

## 2024-05-06 NOTE — ASSESSMENT & PLAN NOTE
Chronic condition.  The patient takes vitamin D supplementation.  Patient tolerating medication well.  Lab test requested for follow-up

## 2024-05-06 NOTE — ASSESSMENT & PLAN NOTE
This is a chronic condition.  Patient presently taking atorvastatin 20 Mg daily.  Patient tolerating medication well.  Patient is due for lab test.

## 2024-05-09 LAB
25(OH)D3+25(OH)D2 SERPL-MCNC: 39.5 NG/ML (ref 30–100)
ALT SERPL-CCNC: 20 IU/L (ref 0–32)
BUN SERPL-MCNC: 10 MG/DL (ref 8–27)
BUN/CREAT SERPL: 17 (ref 12–28)
CALCIUM SERPL-MCNC: 9.1 MG/DL (ref 8.7–10.3)
CHLORIDE SERPL-SCNC: 103 MMOL/L (ref 96–106)
CHOLEST SERPL-MCNC: 166 MG/DL (ref 100–199)
CO2 SERPL-SCNC: 21 MMOL/L (ref 20–29)
CREAT SERPL-MCNC: 0.58 MG/DL (ref 0.57–1)
EGFRCR SERPLBLD CKD-EPI 2021: 100 ML/MIN/1.73
GLUCOSE SERPL-MCNC: 86 MG/DL (ref 70–99)
HBA1C MFR BLD: 5.5 % (ref 4.8–5.6)
HCV IGG SERPL QL IA: NON REACTIVE
HDLC SERPL-MCNC: 95 MG/DL
LABORATORY COMMENT REPORT: NORMAL
LDLC SERPL CALC-MCNC: 55 MG/DL (ref 0–99)
POTASSIUM SERPL-SCNC: 4 MMOL/L (ref 3.5–5.2)
SODIUM SERPL-SCNC: 141 MMOL/L (ref 134–144)
TRIGL SERPL-MCNC: 90 MG/DL (ref 0–149)
TSH SERPL DL<=0.005 MIU/L-ACNC: 2.31 UIU/ML (ref 0.45–4.5)
VLDLC SERPL CALC-MCNC: 16 MG/DL (ref 5–40)

## 2024-06-17 ENCOUNTER — HOSPITAL ENCOUNTER (OUTPATIENT)
Dept: RADIOLOGY | Facility: MEDICAL CENTER | Age: 64
End: 2024-06-17
Attending: INTERNAL MEDICINE
Payer: COMMERCIAL

## 2024-06-17 DIAGNOSIS — Z82.49 FAMILY HISTORY OF ATHEROSCLEROSIS: Chronic | ICD-10-CM

## 2024-06-17 PROCEDURE — 306734 US-TOTAL VASCULAR SCREENING (S/P)

## 2024-06-25 ENCOUNTER — TELEPHONE (OUTPATIENT)
Dept: CARDIOLOGY | Facility: MEDICAL CENTER | Age: 64
End: 2024-06-25
Payer: COMMERCIAL

## 2024-06-25 NOTE — TELEPHONE ENCOUNTER
CW     Caller: Anisa Murillo    Topic/issue: Pt would like to go over test results from 06/17/24.    Callback Number: [phone]   Best time to call back is any day before 11am    Thank you,   Abigail ARTEAGA

## 2024-06-26 ENCOUNTER — PATIENT MESSAGE (OUTPATIENT)
Dept: CARDIOLOGY | Facility: MEDICAL CENTER | Age: 64
End: 2024-06-26
Payer: COMMERCIAL

## 2024-06-26 NOTE — TELEPHONE ENCOUNTER
Upon chart review, result note was already sent to DB, awaiting response.    Attempted to call pt, [phone]  unable to reach.  Left voicemail to return this call at their earliest convenience.    Upon chart review, pt is active on [employer].  Last login 6/25/2024.  [employer] message sent, awaiting pt response.

## 2024-08-29 ENCOUNTER — OFFICE VISIT (OUTPATIENT)
Dept: MEDICAL GROUP | Facility: PHYSICIAN GROUP | Age: 64
End: 2024-08-29
Payer: COMMERCIAL

## 2024-08-29 VITALS
WEIGHT: 111.6 LBS | OXYGEN SATURATION: 95 % | SYSTOLIC BLOOD PRESSURE: 114 MMHG | DIASTOLIC BLOOD PRESSURE: 76 MMHG | RESPIRATION RATE: 13 BRPM | TEMPERATURE: 98.4 F | HEIGHT: 61 IN | BODY MASS INDEX: 21.07 KG/M2 | HEART RATE: 96 BPM

## 2024-08-29 DIAGNOSIS — R14.0 BLOATING: ICD-10-CM

## 2024-08-29 DIAGNOSIS — R10.2 PELVIC PAIN: ICD-10-CM

## 2024-08-29 PROCEDURE — 99214 OFFICE O/P EST MOD 30 MIN: CPT

## 2024-08-29 PROCEDURE — 3078F DIAST BP <80 MM HG: CPT

## 2024-08-29 PROCEDURE — 3074F SYST BP LT 130 MM HG: CPT

## 2024-09-06 LAB
ALBUMIN SERPL-MCNC: 4.6 G/DL (ref 3.9–4.9)
ALP SERPL-CCNC: 73 IU/L (ref 44–121)
ALT SERPL-CCNC: 29 IU/L (ref 0–32)
AST SERPL-CCNC: 36 IU/L (ref 0–40)
BILIRUB DIRECT SERPL-MCNC: 0.19 MG/DL (ref 0–0.4)
BILIRUB SERPL-MCNC: 0.5 MG/DL (ref 0–1.2)
PROT SERPL-MCNC: 7.2 G/DL (ref 6–8.5)

## 2024-09-06 NOTE — PROGRESS NOTES
Verbal consent was acquired by the patient to use [employer] ambient listening note generation during this visit     Subjective:     HPI:   History of Present Illness  The patient presents for evaluation of bloating.    She reports a sensation of tightness in her abdomen, which she finds unusual given her age and regular exercise routine. This bloating has persisted for over a month. She has noticed a protrusion in her abdomen, despite no significant weight gain according to the scale. She also experiences a feeling of fullness, even though her food intake is not excessive. Her diet is generally healthy, with occasional indulgence in tacos. She consumes a significant amount of fruits and vegetables and admits to daily wine consumption.    Her bowel movements vary from one to three times a day, and she reports no constipation or diarrhea. However, she occasionally feels incomplete relief after defecation. Physical activity, such as walking, seems to stimulate her bowels. She reports no presence of blood or mucus in her stool. The bloating is a constant discomfort for her, and she is considering dietary changes to alleviate it.She also experiences mild cramping. She has no history of liver issues.    She has a surgical history of breast augmentation.    FAMILY HISTORY  She has a family history of diverticulosis.        Assessment & Plan:     Problem List Items Addressed This Visit    None  Visit Diagnoses       Bloating        Relevant Orders    US-PELVIC COMPLETE (TRANSABDOMINAL/TRANSVAGINAL) (COMBO)    HEPATIC FUNCTION PANEL    Pelvic pain        Relevant Orders    US-PELVIC COMPLETE (TRANSABDOMINAL/TRANSVAGINAL) (COMBO)              1. Bloating  2. Pelvic pain  Undiagnosed concern with uncertain prognosis.   Does not appear to be related to gastrointestinal issues, check ultrasound to rule out gynecological cause. Patient has concerns about her liver, requesting liver enzymes as well due to alcohol use   -  US-PELVIC COMPLETE (TRANSABDOMINAL/TRANSVAGINAL) (COMBO); Future  - HEPATIC FUNCTION PANEL; Future      Health Maintenance: Orders placed as applicable to patient     Objective:     Exam:  Objective:  Vitals:    08/29/24 1409   BP: 114/76   Pulse: 96   Resp: 13   Temp: 36.9 °C (98.4 °F)   SpO2: 95%     Physical Exam  Physical Exam  Abdominal:      General: Bowel sounds are normal. There is distension.      Tenderness: There is abdominal tenderness in the suprapubic area. There is no guarding.         Constitutional:       Appearance: Normal appearance.   Eyes:      Extraocular Movements: Extraocular movements intact.   Pulmonary:      Effort: Pulmonary effort is normal.   Neurological:      General: No focal deficit present.      Mental Status: She is alert and oriented to person, place, and time.   Psychiatric:         Mood and Affect: Mood normal.         Behavior: Behavior normal.       Return if symptoms worsen or fail to improve.    Please note that this dictation was created using voice recognition software. I have made every reasonable attempt to correct obvious errors, but I expect that there are errors of grammar and possibly content that I did not discover before finalizing the note.

## 2024-09-30 ENCOUNTER — HOSPITAL ENCOUNTER (OUTPATIENT)
Dept: RADIOLOGY | Facility: MEDICAL CENTER | Age: 64
End: 2024-09-30
Payer: COMMERCIAL

## 2024-09-30 DIAGNOSIS — R10.2 PELVIC PAIN: ICD-10-CM

## 2024-09-30 DIAGNOSIS — R14.0 BLOATING: ICD-10-CM

## 2024-09-30 PROCEDURE — 76830 TRANSVAGINAL US NON-OB: CPT

## 2024-10-15 DIAGNOSIS — R10.2 PELVIC PAIN: ICD-10-CM

## 2024-10-15 DIAGNOSIS — R14.0 BLOATING: ICD-10-CM

## 2024-10-24 DIAGNOSIS — Z79.899 HIGH RISK MEDICATION USE: ICD-10-CM

## 2024-10-28 ENCOUNTER — HOSPITAL ENCOUNTER (OUTPATIENT)
Dept: RADIOLOGY | Facility: MEDICAL CENTER | Age: 64
End: 2024-10-28
Payer: COMMERCIAL

## 2024-10-28 ENCOUNTER — HOSPITAL ENCOUNTER (OUTPATIENT)
Dept: LAB | Facility: MEDICAL CENTER | Age: 64
End: 2024-10-28
Payer: COMMERCIAL

## 2024-10-28 DIAGNOSIS — Z79.899 HIGH RISK MEDICATION USE: ICD-10-CM

## 2024-10-28 DIAGNOSIS — R14.0 BLOATING: ICD-10-CM

## 2024-10-28 DIAGNOSIS — R10.2 PELVIC PAIN: ICD-10-CM

## 2024-10-28 LAB
ALBUMIN SERPL BCP-MCNC: 4.3 G/DL (ref 3.2–4.9)
ALBUMIN/GLOB SERPL: 1.4 G/DL
ALP SERPL-CCNC: 75 U/L (ref 30–99)
ALT SERPL-CCNC: 19 U/L (ref 2–50)
ANION GAP SERPL CALC-SCNC: 11 MMOL/L (ref 7–16)
AST SERPL-CCNC: 25 U/L (ref 12–45)
BILIRUB SERPL-MCNC: 0.5 MG/DL (ref 0.1–1.5)
BUN SERPL-MCNC: 11 MG/DL (ref 8–22)
CALCIUM ALBUM COR SERPL-MCNC: 9.4 MG/DL (ref 8.5–10.5)
CALCIUM SERPL-MCNC: 9.6 MG/DL (ref 8.4–10.2)
CHLORIDE SERPL-SCNC: 101 MMOL/L (ref 96–112)
CO2 SERPL-SCNC: 26 MMOL/L (ref 20–33)
CREAT SERPL-MCNC: 0.65 MG/DL (ref 0.5–1.4)
FASTING STATUS PATIENT QL REPORTED: NORMAL
GFR SERPLBLD CREATININE-BSD FMLA CKD-EPI: 97 ML/MIN/1.73 M 2
GLOBULIN SER CALC-MCNC: 3 G/DL (ref 1.9–3.5)
GLUCOSE SERPL-MCNC: 103 MG/DL (ref 65–99)
POTASSIUM SERPL-SCNC: 3.8 MMOL/L (ref 3.6–5.5)
PROT SERPL-MCNC: 7.3 G/DL (ref 6–8.2)
SODIUM SERPL-SCNC: 138 MMOL/L (ref 135–145)

## 2024-10-28 PROCEDURE — 72193 CT PELVIS W/DYE: CPT

## 2024-10-28 PROCEDURE — 36415 COLL VENOUS BLD VENIPUNCTURE: CPT

## 2024-10-28 PROCEDURE — 80053 COMPREHEN METABOLIC PANEL: CPT

## 2024-10-28 PROCEDURE — 700117 HCHG RX CONTRAST REV CODE 255

## 2024-10-28 RX ADMIN — IOHEXOL 100 ML: 350 INJECTION, SOLUTION INTRAVENOUS at 07:59

## 2024-12-11 ENCOUNTER — APPOINTMENT (OUTPATIENT)
Dept: URBAN - METROPOLITAN AREA CLINIC 35 | Facility: CLINIC | Age: 64
Setting detail: DERMATOLOGY
End: 2024-12-11

## 2024-12-11 DIAGNOSIS — L82.1 OTHER SEBORRHEIC KERATOSIS: ICD-10-CM

## 2024-12-11 DIAGNOSIS — L81.4 OTHER MELANIN HYPERPIGMENTATION: ICD-10-CM

## 2024-12-11 DIAGNOSIS — Z71.89 OTHER SPECIFIED COUNSELING: ICD-10-CM

## 2024-12-11 DIAGNOSIS — L85.3 XEROSIS CUTIS: ICD-10-CM

## 2024-12-11 DIAGNOSIS — B35.1 TINEA UNGUIUM: ICD-10-CM | Status: INADEQUATELY CONTROLLED

## 2024-12-11 DIAGNOSIS — D22 MELANOCYTIC NEVI: ICD-10-CM

## 2024-12-11 DIAGNOSIS — L91.8 OTHER HYPERTROPHIC DISORDERS OF THE SKIN: ICD-10-CM

## 2024-12-11 DIAGNOSIS — D18.0 HEMANGIOMA: ICD-10-CM

## 2024-12-11 DIAGNOSIS — B35.3 TINEA PEDIS: ICD-10-CM | Status: WELL CONTROLLED

## 2024-12-11 PROBLEM — D22.39 MELANOCYTIC NEVI OF OTHER PARTS OF FACE: Status: ACTIVE | Noted: 2024-12-11

## 2024-12-11 PROBLEM — D18.01 HEMANGIOMA OF SKIN AND SUBCUTANEOUS TISSUE: Status: ACTIVE | Noted: 2024-12-11

## 2024-12-11 PROCEDURE — ? TREATMENT REGIMEN

## 2024-12-11 PROCEDURE — ? LIQUID NITROGEN

## 2024-12-11 PROCEDURE — ? OBSERVATION AND MEASURE

## 2024-12-11 PROCEDURE — ? PRESCRIPTION

## 2024-12-11 PROCEDURE — ? COUNSELING

## 2024-12-11 PROCEDURE — 99214 OFFICE O/P EST MOD 30 MIN: CPT | Mod: 25

## 2024-12-11 PROCEDURE — ? ADDITIONAL NOTES

## 2024-12-11 PROCEDURE — 17110 DESTRUCTION B9 LES UP TO 14: CPT

## 2024-12-11 PROCEDURE — ? BENIGN DESTRUCTION

## 2024-12-11 PROCEDURE — ? SUNSCREEN RECOMMENDATIONS

## 2024-12-11 ASSESSMENT — LOCATION ZONE DERM
LOCATION ZONE: FACE
LOCATION ZONE: EYELID
LOCATION ZONE: TRUNK
LOCATION ZONE: NECK
LOCATION ZONE: TOENAIL

## 2024-12-11 ASSESSMENT — LOCATION DETAILED DESCRIPTION DERM
LOCATION DETAILED: LEFT CLAVICULAR NECK
LOCATION DETAILED: LEFT INFERIOR LATERAL LOWER BACK
LOCATION DETAILED: LEFT MEDIAL UPPER BACK
LOCATION DETAILED: LEFT SUPERIOR CENTRAL MALAR CHEEK
LOCATION DETAILED: LEFT GREAT TOENAIL
LOCATION DETAILED: RIGHT INFERIOR UPPER BACK
LOCATION DETAILED: RIGHT LATERAL SUPERIOR CHEST
LOCATION DETAILED: RIGHT LATERAL SUPERIOR EYELID
LOCATION DETAILED: RIGHT MEDIAL SUPERIOR CHEST

## 2024-12-11 ASSESSMENT — LOCATION SIMPLE DESCRIPTION DERM
LOCATION SIMPLE: LEFT CHEEK
LOCATION SIMPLE: RIGHT SUPERIOR EYELID
LOCATION SIMPLE: LEFT LOWER BACK
LOCATION SIMPLE: CHEST
LOCATION SIMPLE: RIGHT UPPER BACK
LOCATION SIMPLE: LEFT GREAT TOE
LOCATION SIMPLE: LEFT ANTERIOR NECK
LOCATION SIMPLE: LEFT UPPER BACK

## 2024-12-11 NOTE — PROCEDURE: COUNSELING
Detail Level: Generalized
Detail Level: Simple
Patient Specific Counseling (Will Not Stick From Patient To Patient): Does not want to continue oral lamisil
Detail Level: Detailed
Detail Level: Zone

## 2024-12-11 NOTE — PROCEDURE: LIQUID NITROGEN
Duration Of Freeze Thaw-Cycle (Seconds): 5
Consent: The patient's consent was obtained including but not limited to risks of crusting, scabbing, blistering, scarring, darker or lighter pigmentary change, recurrence, incomplete removal and infection.
Medical Necessity Clause: This procedure was medically necessary because the lesions that were treated were:
Number Of Freeze-Thaw Cycles: 2 freeze-thaw cycles
Medical Necessity Information: It is in your best interest to select a reason for this procedure from the list below. All of these items fulfill various CMS LCD requirements except the new and changing color options.
Render In Bullet Format When Appropriate: No
Total Number Of Lesions Treated: 1
Spray Paint Text: The liquid nitrogen was applied to the skin utilizing a spray paint frosting technique.
Detail Level: Generalized
Render Post Care In The Note?: yes
Post-Care Instructions: I reviewed with the patient in detail post-care instructions. Patient is to wear sunprotection, and avoid picking at any of the treated lesions. Pt may apply Vaseline to crusted or scabbing areas.

## 2024-12-11 NOTE — PROCEDURE: BENIGN DESTRUCTION
Medical Necessity Information: It is in your best interest to select a reason for this procedure from the list below. All of these items fulfill various CMS LCD requirements except the new and changing color options.
Post-Care Instructions: I reviewed with the patient in detail post-care instructions. Patient is to wear sunprotection, and avoid picking at any of the treated lesions. Pt may apply Vaseline to crusted or scabbing areas.\\nCantharidin is to be washed off in four hours.
Render Post-Care Instructions In Note?: yes
Include Z78.9 (Other Specified Conditions Influencing Health Status) As An Associated Diagnosis?: No
Medical Necessity Clause: This procedure was medically necessary because the lesions that were treated were:
Total Number Of Lesions Treated: 2
Consent: The patient's consent was obtained including but not limited to risks of crusting, scabbing, blistering, scarring, darker or lighter pigmentary change, recurrence, incomplete removal and infection.
Detail Level: Detailed

## 2024-12-11 NOTE — PROCEDURE: TREATMENT REGIMEN
Detail Level: Zone
Discontinue Regimen: - jublia to toes once a day (pt has not used in over a year)
Continue Regimen: - ketoconazole 2 % topical cream. Apply twice daily to feet, dry feet and massage in to feet and toes
Plan: - 12/11/24 pt has not used any rx on feet but states feet and nails have improved. Pt instructed to dry feet completely after shower and then apply cream. No new Rx sent today.\\n\\n- After labs are reviewed plan, Terbinafine Rx
Plan: - 12/11/24 pt instructed to discontinue dial body wash.
Otc Regimen: - Cerave or Aveeno body wash

## 2024-12-12 RX ORDER — KETOCONAZOLE 20 MG/G
CREAM TOPICAL
Qty: 60 | Refills: 10 | Status: ERX

## 2025-02-27 ENCOUNTER — OFFICE VISIT (OUTPATIENT)
Dept: CARDIOLOGY | Facility: MEDICAL CENTER | Age: 65
End: 2025-02-27
Attending: INTERNAL MEDICINE
Payer: COMMERCIAL

## 2025-02-27 VITALS
BODY MASS INDEX: 21.71 KG/M2 | OXYGEN SATURATION: 95 % | DIASTOLIC BLOOD PRESSURE: 72 MMHG | RESPIRATION RATE: 16 BRPM | WEIGHT: 115 LBS | HEIGHT: 61 IN | HEART RATE: 88 BPM | SYSTOLIC BLOOD PRESSURE: 110 MMHG

## 2025-02-27 DIAGNOSIS — Z82.49 FAMILY HISTORY OF ATHEROSCLEROSIS: Chronic | ICD-10-CM

## 2025-02-27 DIAGNOSIS — E78.5 DYSLIPIDEMIA: Chronic | ICD-10-CM

## 2025-02-27 PROCEDURE — 99212 OFFICE O/P EST SF 10 MIN: CPT | Performed by: INTERNAL MEDICINE

## 2025-02-27 ASSESSMENT — FIBROSIS 4 INDEX: FIB4 SCORE: 1.5

## 2025-02-27 NOTE — PATIENT INSTRUCTIONS
Work on at least 2.5 - 5 hours a week of moderate exercise (typical brisk walking or similar activity)    If you have had a heart attack, stent, bypass or reduced heart strength (EF <35%): cardiac rehab may reduce your risk of dying by 13-24% and need to go to the hospital by 30% within the first year (1)    Please look into the following diets and incorporate them into your diet    CONSIDER CHECKING A CALCIUM SCORE TO UNDERSTAND YOUR RISK MORE    https://internal.[facility].org/about/procedures/tools/rlyk-xqkfz-wlgo-calculator    LOW SALT DIET   KEEP YOUR SODIUM EQUAL TO CALORIES AND NO MORE THAN DOUBLE THE CALORIES FOR A LOW SALT DIET    Cardiosmart.org - great resource for American College of Cardiology on heart disease prevention and treatment    FOR TREATMENT OR PREVENTION OF CORONARY ARTERY DISEASE  These three programs are approved by Medicare/Insurers for those with heart disease  [place] [facility]  Dr. Salcedo[facility] Program for Reversing Heart Disease - Jun Linder's Cardiologist vegetarian-based  [facility] - [place]-based mind-body Program    Mediterranean Diet has been shown to be a hearty healthy diet.    This is a commonly referenced Program  Dr Cummins - [place] [facility] [place] (book and documentary) - vegetarian-based    FOR TREATMENT OF BLOOD PRESSURE  DASH DIET - American Heart Association for treatment of HYPERTENSION    FOR TREATMENT OF BAD CHOLESTEROL/FATS  REDUCE PROCESSED SUGAR AS MUCH AS POSSIBLE  INCREASE WHOLE GRAINS/VEGETABLES  INCREASE FIBER    Lowering total cholesterol and LDL (bad) cholesterol:  - Eat leaner cuts of meat, or eliminate altogether if possible red meat, and frequently substitute fish or chicken.  - Limit saturated fat to no more than 7-10% of total calories no more than 10 g per day is recommended. Some sources of saturated fat include butter, animal fats, hydrogenated vegetable fats and oils, many desserts, whole  milk dairy products.  - Replaced saturated fats with polyunsaturated fats and monounsaturated fats. Foods high in monounsaturated fat include nuts, canola oil, avocados, and olives.  - Limit trans fat (processed foods) and replaced with fresh fruits and vegetables  - Recommend nonfat dairy products  - Increase substantially the amount of soluble fiber intake (legumes such as beans, fruit, whole grains).  - Consider nutritional supplements: plant sterile spreads such as Benecol, fish oil,  flaxseed oil, omega-3 acids capsules 1000 mg twice a day, or viscous fiber such as Metamucil  - Attain ideal weight and regular exercise (at least 30 minutes per day of moderate exercise)  ASK ABOUT STATIN OR NON STATIN MEDICATION TO REDUCE YOUR LDL AND HEART RISK    Lowering triglycerides:  - Reduce intake of simple sugar: Desserts, candy, pastries, honey, sodas, sugared cereals, yogurt, Gatorade, sports bars, canned fruit, smoothies, fruit juice, coffee drinks  - Reduced intake of refined starches: Refined Pasta, most bread  - Reduce or abstain from alcohol  - Increase omega-3 fatty acids: [place], Trout, Mackerel, Herring, [place] tuna and supplements  - Attain ideal weight and regular exercise (at least 30 minutes per day of moderate exercise)  ASK ABOUT PURIFIED OMEGA 3 EPA or FISH OIL TO REDUCE YOUR TG AND HEART RISK    Elevating HDL (good) cholesterol:  - Increase physical activity  - Increase omega-3 fatty acids and supplements as listed above  - Incorporating appropriate amounts of monounsaturated fats such as nuts, olive oil, canola oil, avocados, olives  - Stop smoking  - Attain ideal weight and regular exercise (at least 30 minutes per day of moderate exercise)

## 2025-02-27 NOTE — PROGRESS NOTES
Chief Complaint   Patient presents with    Other     F/V Dx: Family history of atherosclerosis    Dyslipidemia       Subjective     Anisa Murillo is a 64 y.o. female who presents today with family hisotry of atherosclerosis    Doing well had some ortho surgeries    Brother had stroke     Past Medical History:   Diagnosis Date    Anxiety 3/24/2015    Contusion 10/26/2015    Eczema 3/22/2016    Herpes simplex type 1 infection 10/26/2015    Hypertension     Insomnia     Osteopenia 3/24/2015     Past Surgical History:   Procedure Laterality Date    [place] BREAST AUGMENTATION WITH IMPLANT       Family History   Problem Relation Age of Onset    Cancer Mother         breast     Diabetes Mother     Heart Disease Mother 80        carotid disease and CEA    Stroke Father     Stroke Sister     Genetic Disorder Son         autism      Social History     Socioeconomic History    Marital status: Single     Spouse name: Not on file    Number of children: Not on file    Years of education: Not on file    Highest education level: Not on file   Occupational History    Not on file   Tobacco Use    Smoking status: Former     Current packs/day: 0.00     Types: Cigarettes    Smokeless tobacco: Never   Vaping Use    Vaping status: Never Used   Substance and Sexual Activity    Alcohol use: Yes     Alcohol/week: 3.6 oz     Types: 6 Standard drinks or equivalent per week     Comment: 2x a week    Drug use: Not Currently     Comment: history of    Sexual activity: Yes     Partners: Male     Birth control/protection: Post-Menopausal   Other Topics Concern    Not on file   Social History Narrative    Not on file     Social Drivers of Health     Financial Resource Strain: Not on file   Food Insecurity: Not on file   Transportation Needs: Not on file   Physical Activity: Not on file   Stress: Not on file   Social Connections: Not on file   Intimate Partner Violence: Not on file   Housing Stability: Not on file     No Known  "Allergies  Outpatient Encounter Medications as of 2/27/2025   Medication Sig Dispense Refill    atorvastatin (LIPITOR) 20 MG Tab Take 1 Tablet by mouth every day. 90 Tablet 3    desonide (DESOWEN) 0.05 % ointment APPLY TO AFFECTED AREA TWICE A DAY WHEN FLARING      XIIDRA 5 % Solution PLACE 1 DROP INTO BOTH EYES TWICE A DAY      valACYclovir (VALTREX) 500 MG Tab Take 500 mg by mouth 1 time a day as needed.  3    Multiple Vitamin (MULTI-VITAMINS PO) Take  by mouth.      FIBER SELECT GUMMIES PO Take  by mouth.      vitamin D (CHOLECALCIFEROL) 1000 UNIT TABS Take 2,000 Units by mouth every day.      Calcium-Vitamin D (CALCIUM + D PO) Take  by mouth.      furosemide (LASIX) 20 MG Tab Take 1 Tablet by mouth 1 time a day as needed (water retention). 30 Tablet 3    [DISCONTINUED] ciclopirox (PENLAC) 8 % solution APPLY TO CLEAN TOENAILS ONCE A DAY, CLEAN NAILS WITH ALCOHOL WIPE ONCE A WEEK.      [DISCONTINUED] ketoconazole (NIZORAL) 2 % shampoo WASH AFFECTED AREAS ON BODY ONCE A DAY AS NEEDED (Patient not taking: Reported on 8/29/2024)      [DISCONTINUED] diclofenac sodium (VOLTAREN) 1 % Gel APPLY TWICE DAILY AS NEEDED TO AFFECTED AREA (Patient not taking: Reported on 2/27/2025)      [DISCONTINUED] Ascorbic Acid (CORNELIO-C PO) Take  by mouth.       No facility-administered encounter medications on file as of 2/27/2025.     ROS           Objective     /72 (BP Location: Left arm, Patient Position: Sitting, BP Cuff Size: Adult)   Pulse 88   Resp 16   Ht 1.549 m (5' 1\")   Wt 52.2 kg (115 lb)   SpO2 95%   BMI 21.73 kg/m²     Physical Exam  Constitutional:       General: She is not in acute distress.     Appearance: She is not diaphoretic.   Eyes:      General: No scleral icterus.  Neck:      Vascular: No JVD.   Cardiovascular:      Rate and Rhythm: Normal rate.      Heart sounds: Normal heart sounds. No murmur heard.     No friction rub. No gallop.   Pulmonary:      Effort: No respiratory distress.      Breath sounds: " No wheezing or rales.   Abdominal:      General: Bowel sounds are normal.      Palpations: Abdomen is soft.   Musculoskeletal:      Right lower leg: No edema.      Left lower leg: No edema.   Skin:     Findings: No rash.   Neurological:      Mental Status: She is alert. Mental status is at baseline.   Psychiatric:         Mood and Affect: Mood normal.            We reviewed in person the most recent labs  Recent Results (from the past 30 weeks)   HEPATIC FUNCTION PANEL    Collection Time: 09/03/24 12:20 PM   Result Value Ref Range    Total Protein 7.2 6.0 - 8.5 g/dL    Albumin 4.6 3.9 - 4.9 g/dL    Total Bilirubin 0.5 0.0 - 1.2 mg/dL    Direct Bilirubin 0.19 0.00 - 0.40 mg/dL    Alkaline Phosphatase 73 44 - 121 IU/L    AST(SGOT) 36 0 - 40 IU/L    ALT(SGPT) 29 0 - 32 IU/L   Comp Metabolic Panel    Collection Time: 10/28/24  7:05 AM   Result Value Ref Range    Sodium 138 135 - 145 mmol/L    Potassium 3.8 3.6 - 5.5 mmol/L    Chloride 101 96 - 112 mmol/L    Co2 26 20 - 33 mmol/L    Anion Gap 11.0 7.0 - 16.0    Glucose 103 (H) 65 - 99 mg/dL    Bun 11 8 - 22 mg/dL    Creatinine 0.65 0.50 - 1.40 mg/dL    Calcium 9.6 8.4 - 10.2 mg/dL    Correct Calcium 9.4 8.5 - 10.5 mg/dL    AST(SGOT) 25 12 - 45 U/L    ALT(SGPT) 19 2 - 50 U/L    Alkaline Phosphatase 75 30 - 99 U/L    Total Bilirubin 0.5 0.1 - 1.5 mg/dL    Albumin 4.3 3.2 - 4.9 g/dL    Total Protein 7.3 6.0 - 8.2 g/dL    Globulin 3.0 1.9 - 3.5 g/dL    A-G Ratio 1.4 g/dL   FASTING STATUS    Collection Time: 10/28/24  7:05 AM   Result Value Ref Range    Fasting Status Non-Fasting    ESTIMATED GFR    Collection Time: 10/28/24  7:05 AM   Result Value Ref Range    GFR (CKD-EPI) 97 >60 mL/min/1.73 m 2         Assessment & Plan     1. Dyslipidemia        2. Family history of atherosclerosis - stroke            Medical Decision Making: Today's Assessment/Status/Plan:      It was my pleasure to meet with MsBryce Murillo.    Blood pressure is well controlled.  She will continue to monitor  and eat hearty healthy diet.    We addressed the management of dyslipidemia at today's visit. She is on appropriate lipid lowering medication.      I will see Ms. Murillo back in 1 year time and encouraged her to follow up with us over the phone or electronically using my MyChart as issues arise.    It is my pleasure to participate in the care of Ms. Murillo.  Please do not hesitate to contact me with questions or concerns.    Claude Eisenberg MD PhD [facility]  Cardiologist [facility]    Please note that this dictation was created using voice recognition software. There may be errors I did not discover before finalizing the note.

## 2025-04-15 ENCOUNTER — HOSPITAL ENCOUNTER (OUTPATIENT)
Dept: RADIOLOGY | Facility: MEDICAL CENTER | Age: 65
End: 2025-04-15
Attending: INTERNAL MEDICINE
Payer: COMMERCIAL

## 2025-04-15 DIAGNOSIS — Z12.31 VISIT FOR SCREENING MAMMOGRAM: ICD-10-CM

## 2025-04-15 PROCEDURE — 77067 SCR MAMMO BI INCL CAD: CPT

## 2025-04-17 ENCOUNTER — RESULTS FOLLOW-UP (OUTPATIENT)
Dept: MEDICAL GROUP | Facility: PHYSICIAN GROUP | Age: 65
End: 2025-04-17
Payer: COMMERCIAL

## 2025-06-10 DIAGNOSIS — E78.5 DYSLIPIDEMIA: ICD-10-CM

## 2025-06-10 RX ORDER — ATORVASTATIN CALCIUM 20 MG/1
20 TABLET, FILM COATED ORAL DAILY
Qty: 60 TABLET | Refills: 0 | Status: SHIPPED | OUTPATIENT
Start: 2025-06-10

## 2025-06-10 NOTE — TELEPHONE ENCOUNTER
Received request via: Pharmacy    Was the patient seen in the last year in this department? YES    Does the patient have an active prescription (recently filled or refills available) for medication(s) requested? No    Pharmacy Name: [facility]/pharmacy #[id number] - [place], [place] - [address]     Does the patient have long-term Plus and need 100-day supply? (This applies to ALL medications) Patient does not have SCP

## 2025-06-23 ENCOUNTER — TELEPHONE (OUTPATIENT)
Dept: MEDICAL GROUP | Facility: PHYSICIAN GROUP | Age: 65
End: 2025-06-23
Payer: COMMERCIAL

## 2025-06-23 DIAGNOSIS — R73.03 PREDIABETES: Chronic | ICD-10-CM

## 2025-06-23 DIAGNOSIS — E78.5 DYSLIPIDEMIA: Primary | Chronic | ICD-10-CM

## 2025-06-23 DIAGNOSIS — R53.82 CHRONIC FATIGUE: ICD-10-CM

## 2025-06-23 NOTE — TELEPHONE ENCOUNTER
VOICEMAIL  1. Caller Name: Anisa Murillo                        Call Back Number: [phone] (work)      2. Message: pt has appointment 7/8 and is asking for labs prior.  She is asking for the written orders be at the  so she can pick them up.    3. Patient approves office to leave a detailed voicemail/MyChart message: N\A

## 2025-06-27 LAB
ALBUMIN/CREAT UR: 10 MG/G CREAT (ref 0–29)
ALT SERPL-CCNC: 20 IU/L (ref 0–32)
BASOPHILS # BLD AUTO: 0.1 X10E3/UL (ref 0–0.2)
BASOPHILS NFR BLD AUTO: 1 %
BUN SERPL-MCNC: 14 MG/DL (ref 8–27)
BUN/CREAT SERPL: 21 (ref 12–28)
CALCIUM SERPL-MCNC: 9.6 MG/DL (ref 8.7–10.3)
CHLORIDE SERPL-SCNC: 103 MMOL/L (ref 96–106)
CHOLEST SERPL-MCNC: 221 MG/DL (ref 100–199)
CO2 SERPL-SCNC: 27 MMOL/L (ref 20–29)
CREAT SERPL-MCNC: 0.68 MG/DL (ref 0.57–1)
CREAT UR-MCNC: 216.1 MG/DL
EGFRCR SERPLBLD CKD-EPI 2021: 95 ML/MIN/1.73
EOSINOPHIL # BLD AUTO: 0.1 X10E3/UL (ref 0–0.4)
EOSINOPHIL NFR BLD AUTO: 1 %
ERYTHROCYTE [DISTWIDTH] IN BLOOD BY AUTOMATED COUNT: 12.4 % (ref 11.7–15.4)
GLUCOSE SERPL-MCNC: 87 MG/DL (ref 70–99)
HBA1C MFR BLD: 5.5 % (ref 4.8–5.6)
HCT VFR BLD AUTO: 44.9 % (ref 34–46.6)
HDLC SERPL-MCNC: 124 MG/DL
HGB BLD-MCNC: 14.3 G/DL (ref 11.1–15.9)
IMM GRANULOCYTES # BLD AUTO: 0 X10E3/UL (ref 0–0.1)
IMM GRANULOCYTES NFR BLD AUTO: 0 %
IMMATURE CELLS  115398: ABNORMAL
LDL CALC COMMENT:: ABNORMAL
LDLC SERPL CALC-MCNC: 82 MG/DL (ref 0–99)
LYMPHOCYTES # BLD AUTO: 3.1 X10E3/UL (ref 0.7–3.1)
LYMPHOCYTES NFR BLD AUTO: 36 %
MCH RBC QN AUTO: 32.9 PG (ref 26.6–33)
MCHC RBC AUTO-ENTMCNC: 31.8 G/DL (ref 31.5–35.7)
MCV RBC AUTO: 103 FL (ref 79–97)
MICROALBUMIN UR-MCNC: 20.9 UG/ML
MONOCYTES # BLD AUTO: 0.6 X10E3/UL (ref 0.1–0.9)
MONOCYTES NFR BLD AUTO: 7 %
MORPHOLOGY BLD-IMP: ABNORMAL
NEUTROPHILS # BLD AUTO: 4.7 X10E3/UL (ref 1.4–7)
NEUTROPHILS NFR BLD AUTO: 55 %
NRBC BLD AUTO-RTO: ABNORMAL %
PLATELET # BLD AUTO: 323 X10E3/UL (ref 150–450)
POTASSIUM SERPL-SCNC: 3.9 MMOL/L (ref 3.5–5.2)
RBC # BLD AUTO: 4.35 X10E6/UL (ref 3.77–5.28)
SODIUM SERPL-SCNC: 144 MMOL/L (ref 134–144)
TRIGL SERPL-MCNC: 92 MG/DL (ref 0–149)
TSH SERPL DL<=0.005 MIU/L-ACNC: 2.69 UIU/ML (ref 0.45–4.5)
VLDLC SERPL CALC-MCNC: 15 MG/DL (ref 5–40)
WBC # BLD AUTO: 8.6 X10E3/UL (ref 3.4–10.8)

## 2025-06-28 ENCOUNTER — RESULTS FOLLOW-UP (OUTPATIENT)
Dept: MEDICAL GROUP | Facility: PHYSICIAN GROUP | Age: 65
End: 2025-06-28

## 2025-07-08 ENCOUNTER — OFFICE VISIT (OUTPATIENT)
Dept: MEDICAL GROUP | Facility: PHYSICIAN GROUP | Age: 65
End: 2025-07-08
Payer: COMMERCIAL

## 2025-07-08 VITALS
HEART RATE: 84 BPM | OXYGEN SATURATION: 96 % | HEIGHT: 61 IN | TEMPERATURE: 98 F | DIASTOLIC BLOOD PRESSURE: 74 MMHG | SYSTOLIC BLOOD PRESSURE: 136 MMHG | WEIGHT: 116.2 LBS | BODY MASS INDEX: 21.94 KG/M2 | RESPIRATION RATE: 16 BRPM

## 2025-07-08 DIAGNOSIS — L30.9 ECZEMA, UNSPECIFIED TYPE: Chronic | ICD-10-CM

## 2025-07-08 DIAGNOSIS — B00.9 HERPES SIMPLEX TYPE 1 INFECTION: Chronic | ICD-10-CM

## 2025-07-08 DIAGNOSIS — D75.89 MACROCYTOSIS: Primary | ICD-10-CM

## 2025-07-08 DIAGNOSIS — E55.9 VITAMIN D DEFICIENCY: Chronic | ICD-10-CM

## 2025-07-08 DIAGNOSIS — R73.03 PREDIABETES: Chronic | ICD-10-CM

## 2025-07-08 DIAGNOSIS — E78.5 DYSLIPIDEMIA: Chronic | ICD-10-CM

## 2025-07-08 PROBLEM — T85.43XA RUPTURED LEFT BREAST IMPLANT: Status: RESOLVED | Noted: 2023-11-12 | Resolved: 2025-07-08

## 2025-07-08 PROCEDURE — 3078F DIAST BP <80 MM HG: CPT | Performed by: INTERNAL MEDICINE

## 2025-07-08 PROCEDURE — 99214 OFFICE O/P EST MOD 30 MIN: CPT | Performed by: INTERNAL MEDICINE

## 2025-07-08 PROCEDURE — 3075F SYST BP GE 130 - 139MM HG: CPT | Performed by: INTERNAL MEDICINE

## 2025-07-08 ASSESSMENT — FIBROSIS 4 INDEX: FIB4 SCORE: 1.16

## 2025-07-08 ASSESSMENT — PATIENT HEALTH QUESTIONNAIRE - PHQ9: CLINICAL INTERPRETATION OF PHQ2 SCORE: 0

## 2025-07-08 NOTE — PROGRESS NOTES
PRIMARY CARE CLINIC VISIT        Chief Complaint   Patient presents with    Follow-Up    Lab Results    Medication Refill   Lab test result  Microcytosis  Hyperlipidemia  Prediabetes  Herpes simplex  Eczema  Vitamin D deficiency          History of Present Illness     Herpes simplex type 1 infection  Chronic condition.  The patient take valacyclovir as needed.  Patient currently asymptomatic.  No new symptoms reported.    Eczema  Chronic condition.  Followed by dermatology.  The patient has been using desonide ointment as needed.  Tolerating the treatment well.  No new symptoms reported.    Vitamin D deficiency  This is chronic condition.  The patient presently taking calcium with vitamin D.  The patient tolerated the treatment well.    Dyslipidemia  Chronic condition.  The patient being treated with atorvastatin.  Previous lab test result discussed with the patient.  Patient denies chest pain or shortness of breath.    Prediabetes  Chronic condition.  Patient currently on diet therapy.  Previous lab test result reviewed with the patient today.    Medications Ordered Prior to Encounter[1]     Allergies: Patient has no known allergies.    Current Medications and Prescriptions Ordered in Epic[2]    Past Medical History[3]    Past Surgical History[4]    Family History   Problem Relation Age of Onset    Cancer Mother         breast     Diabetes Mother     Heart Disease Mother 80        carotid disease and CEA    Stroke Father     Stroke Sister     Stroke Brother     Genetic Disorder Son         autism        Tobacco Use History[5]    Social History     Substance and Sexual Activity   Alcohol Use Yes    Alcohol/week: 3.6 oz    Types: 6 Standard drinks or equivalent per week    Comment: 2x a week       Review of systems  As per HPI above. All other systems reviewed and negative.      Past Medical, Social, and Family history reviewed and updated in [facility]       LAB DATA:     I have independently reviewed / interpreted  "labs    Lab Results   Component Value Date/Time    HBA1C 5.5 06/26/2025 04:34 AM    HBA1C 5.5 05/07/2024 05:00 AM    HBA1C 5.3 09/21/2020 04:54 AM        Lab Results   Component Value Date/Time    WBC 8.6 06/26/2025 04:34 AM    WBC 5.7 08/27/2013 07:38 AM    HEMOGLOBIN 14.3 06/26/2025 04:34 AM    HEMOGLOBIN 13.9 08/27/2013 07:38 AM    HEMATOCRIT 44.9 06/26/2025 04:34 AM    HEMATOCRIT 40.5 08/27/2013 07:38 AM     (H) 06/26/2025 04:34 AM    .0 (H) 08/27/2013 07:38 AM    PLATELETCT 323 06/26/2025 04:34 AM    PLATELETCT 241 08/27/2013 07:38 AM       Lab Results   Component Value Date/Time    SODIUM 144 06/26/2025 04:34 AM    SODIUM 138 10/28/2024 07:05 AM    POTASSIUM 3.9 06/26/2025 04:34 AM    POTASSIUM 3.8 10/28/2024 07:05 AM    GLUCOSE 87 06/26/2025 04:34 AM    GLUCOSE 103 (H) 10/28/2024 07:05 AM    BUN 14 06/26/2025 04:34 AM    BUN 11 10/28/2024 07:05 AM    CREATININE 0.68 06/26/2025 04:34 AM    CREATININE 0.65 10/28/2024 07:05 AM       Lab Results   Component Value Date/Time    CHOLSTRLTOT 221 (H) 06/26/2025 04:34 AM    CHOLSTRLTOT 214 (H) 10/08/2013 07:40 AM    TRIGLYCERIDE 92 06/26/2025 04:34 AM    TRIGLYCERIDE 61 10/08/2013 07:40 AM     06/26/2025 04:34 AM     10/08/2013 07:40 AM    LDL 90 11/29/2019 04:09 AM    LDL 81 10/08/2013 07:40 AM       Lab Results   Component Value Date/Time    ALTSGPT 20 06/26/2025 04:34 AM    ALTSGPT 19 10/28/2024 07:05 AM          Objective     /74 (BP Location: Right arm, Patient Position: Sitting, BP Cuff Size: Adult)   Pulse 84   Temp 36.7 °C (98 °F) (Temporal)   Resp 16   Ht 1.549 m (5' 1\")   Wt 52.7 kg (116 lb 3.2 oz)   SpO2 96%    Body mass index is 21.96 kg/m².    General: alert in no apparent distress.  Cardiovascular: regular rate and rhythm  Pulmonary: lungs : no wheezing   Gastrointestinal: BS present.       Assessment and Plan     1. Macrocytosis  Chronic condition.  Lab test result discussed with the patient.  Advised the patient " to avoid EtOH.  Additional lab tests ordered to check B12 and folate level.  - VITAMIN B12; Future  - FOLATE; Future    2. Dyslipidemia  Chronic condition.  Unstable.  Cholesterol level elevated.  Patient not interested in increasing dose of atorvastatin  Patient request referral to see dietitian.  Continue with diet and exercise.  - Referral to Nutrition Services  - Lipid Profile; Future    3. Prediabetes  - Referral to Nutrition Services  - HEMOGLOBIN A1C; Future  - Basic Metabolic Panel; Future  Chronic condition.  Recommend healthy diet and exercise.  Continue to monitor    4. Herpes simplex type 1 infection  Chronic condition.  Stable.  Patient may take Valtrex as needed.  Currently the patient asymptomatic    5. Eczema, unspecified type  Chronic stable.  Continue desonide ointment as needed.    6. Vitamin D deficiency  Chronic stable.  Continue vitamin D supplementation.      Follow-up in 6 months            Please note that this dictation was created using voice recognition software. I am continuously working with the software to minimize the number of voice recognition errors, and I have made every attempt to manually correct the errors within my dictation. However, errors related to this voice recognition software may still exist and should be interpreted within the appropriate context.     Shaq Mahoney MD  Internal Medicine  [facility]       [1]   Current Outpatient Medications on File Prior to Visit   Medication Sig Dispense Refill    atorvastatin (LIPITOR) 20 MG Tab TAKE 1 TABLET BY MOUTH EVERY DAY 60 Tablet 0    desonide (DESOWEN) 0.05 % ointment APPLY TO AFFECTED AREA TWICE A DAY WHEN FLARING      XIIDRA 5 % Solution PLACE 1 DROP INTO BOTH EYES TWICE A DAY      valACYclovir (VALTREX) 500 MG Tab Take 500 mg by mouth 1 time a day as needed.  3    Multiple Vitamin (MULTI-VITAMINS PO) Take  by mouth.      FIBER SELECT GUMMIES PO Take  by mouth.      vitamin D (CHOLECALCIFEROL) 1000 UNIT  TABS Take 2,000 Units by mouth every day.      Calcium-Vitamin D (CALCIUM + D PO) Take  by mouth.      furosemide (LASIX) 20 MG Tab Take 1 Tablet by mouth 1 time a day as needed (water retention). (Patient not taking: Reported on 7/8/2025) 30 Tablet 3     No current facility-administered medications on file prior to visit.   [2]   Current Outpatient Medications Ordered in Epic   Medication Sig Dispense Refill    atorvastatin (LIPITOR) 20 MG Tab TAKE 1 TABLET BY MOUTH EVERY DAY 60 Tablet 0    desonide (DESOWEN) 0.05 % ointment APPLY TO AFFECTED AREA TWICE A DAY WHEN FLARING      XIIDRA 5 % Solution PLACE 1 DROP INTO BOTH EYES TWICE A DAY      valACYclovir (VALTREX) 500 MG Tab Take 500 mg by mouth 1 time a day as needed.  3    Multiple Vitamin (MULTI-VITAMINS PO) Take  by mouth.      FIBER SELECT GUMMIES PO Take  by mouth.      vitamin D (CHOLECALCIFEROL) 1000 UNIT TABS Take 2,000 Units by mouth every day.      Calcium-Vitamin D (CALCIUM + D PO) Take  by mouth.      furosemide (LASIX) 20 MG Tab Take 1 Tablet by mouth 1 time a day as needed (water retention). (Patient not taking: Reported on 7/8/2025) 30 Tablet 3     No current Epic-ordered facility-administered medications on file.   [3]   Past Medical History:  Diagnosis Date    Anxiety 3/24/2015    Contusion 10/26/2015    Eczema 3/22/2016    Herpes simplex type 1 infection 10/26/2015    Hypertension     Insomnia     Osteopenia 3/24/2015   [4]   Past Surgical History:  Procedure Laterality Date    DE BREAST AUGMENTATION WITH IMPLANT     [5]   Social History  Tobacco Use   Smoking Status Former    Current packs/day: 0.00    Types: Cigarettes   Smokeless Tobacco Never

## 2025-07-08 NOTE — ASSESSMENT & PLAN NOTE
Chronic condition.  The patient being treated with atorvastatin.  Previous lab test result discussed with the patient.  Patient denies chest pain or shortness of breath.

## 2025-07-08 NOTE — ASSESSMENT & PLAN NOTE
This is chronic condition.  The patient presently taking calcium with vitamin D.  The patient tolerated the treatment well.

## 2025-07-08 NOTE — ASSESSMENT & PLAN NOTE
Chronic condition.  Patient currently on diet therapy.  Previous lab test result reviewed with the patient today.

## 2025-07-08 NOTE — ASSESSMENT & PLAN NOTE
Chronic condition.  The patient take valacyclovir as needed.  Patient currently asymptomatic.  No new symptoms reported.

## 2025-07-08 NOTE — ASSESSMENT & PLAN NOTE
Chronic condition.  Followed by dermatology.  The patient has been using desonide ointment as needed.  Tolerating the treatment well.  No new symptoms reported.

## 2025-07-08 NOTE — ASSESSMENT & PLAN NOTE
Chronic condition.  Hemoglobin normal.  MCV level elevated.  Patient asymptomatic.  The patient does drink alcohol.  Advised the patient to discontinue.  And additional lab test requested check vitamin B12 and folate level.

## 2025-07-14 NOTE — Clinical Note
REFERRAL APPROVAL NOTICE         Sent on July 14, 2025                   Anisa Murillo  [address] [place] [address]                   Dear Ms. Murillo,    After a careful review of the medical information and benefit coverage, [facility] has processed your referral. See below for additional details.    If applicable, you must be actively enrolled with your insurance for coverage of the authorized service. If you have any questions regarding your coverage, please contact your insurance directly.    REFERRAL INFORMATION   Referral #:  [id number]  Referred-To Provider    Referred-By Provider:  Yecenia Mahoney M.D.   [facility]      [address]  [place] [address]  [phone] [address]   [address]  [place] [address]  [phone]    Referral Start Date:  07/08/2025  Referral End Date:   07/08/2026             SCHEDULING  If you do not already have an appointment, please call [phone] to make an appointment.     MORE INFORMATION  If you do not already have a [email or web address] account, sign up at: [email or web address]  You can access your medical information, make appointments, see lab results, billing information, and more.  If you have questions regarding this referral, please contact  the [facility] Referrals department at:             [phone]. Monday - Friday 8:00AM - 5:00PM.     Sincerely,    [facility]

## 2025-08-03 DIAGNOSIS — E78.5 DYSLIPIDEMIA: ICD-10-CM

## 2025-08-04 RX ORDER — ATORVASTATIN CALCIUM 20 MG/1
20 TABLET, FILM COATED ORAL DAILY
Qty: 90 TABLET | Refills: 3 | Status: SHIPPED | OUTPATIENT
Start: 2025-08-04